# Patient Record
Sex: FEMALE | Race: WHITE | NOT HISPANIC OR LATINO | Employment: OTHER | URBAN - METROPOLITAN AREA
[De-identification: names, ages, dates, MRNs, and addresses within clinical notes are randomized per-mention and may not be internally consistent; named-entity substitution may affect disease eponyms.]

---

## 2019-04-02 LAB — PHQ9 SCORE: 3

## 2019-05-10 ENCOUNTER — TRANSFERRED RECORDS (OUTPATIENT)
Dept: HEALTH INFORMATION MANAGEMENT | Facility: CLINIC | Age: 24
End: 2019-05-10

## 2019-05-10 LAB
ALT SERPL-CCNC: 42 U/L (ref 9–72)
AST SERPL-CCNC: 19 U/L (ref 14–59)
CREAT SERPL-MCNC: 0.6 MG/DL (ref 0.5–1)
GLUCOSE SERPL-MCNC: 104 MG/DL (ref 65–100)
POTASSIUM SERPL-SCNC: 4.4 MMOL/L (ref 3.6–5)

## 2019-09-18 ENCOUNTER — OFFICE VISIT (OUTPATIENT)
Dept: FAMILY MEDICINE | Facility: CLINIC | Age: 24
End: 2019-09-18
Payer: MEDICAID

## 2019-09-18 VITALS
SYSTOLIC BLOOD PRESSURE: 128 MMHG | HEIGHT: 63 IN | HEART RATE: 83 BPM | TEMPERATURE: 98.5 F | BODY MASS INDEX: 51.91 KG/M2 | WEIGHT: 293 LBS | OXYGEN SATURATION: 96 % | RESPIRATION RATE: 11 BRPM | DIASTOLIC BLOOD PRESSURE: 77 MMHG

## 2019-09-18 DIAGNOSIS — F41.1 GAD (GENERALIZED ANXIETY DISORDER): ICD-10-CM

## 2019-09-18 DIAGNOSIS — Z11.3 SCREEN FOR STD (SEXUALLY TRANSMITTED DISEASE): ICD-10-CM

## 2019-09-18 DIAGNOSIS — Z13.220 SCREENING FOR LIPOID DISORDERS: ICD-10-CM

## 2019-09-18 DIAGNOSIS — F33.1 MODERATE EPISODE OF RECURRENT MAJOR DEPRESSIVE DISORDER (H): Primary | ICD-10-CM

## 2019-09-18 DIAGNOSIS — F84.0 AUTISM: ICD-10-CM

## 2019-09-18 DIAGNOSIS — Z23 NEED FOR PROPHYLACTIC VACCINATION AND INOCULATION AGAINST INFLUENZA: ICD-10-CM

## 2019-09-18 DIAGNOSIS — J30.1 NON-SEASONAL ALLERGIC RHINITIS DUE TO POLLEN: ICD-10-CM

## 2019-09-18 DIAGNOSIS — F70 MILD INTELLECTUAL DISABILITY: ICD-10-CM

## 2019-09-18 DIAGNOSIS — Z87.898 HISTORY OF SEIZURES: ICD-10-CM

## 2019-09-18 DIAGNOSIS — G43.009 MIGRAINE WITHOUT AURA AND WITHOUT STATUS MIGRAINOSUS, NOT INTRACTABLE: ICD-10-CM

## 2019-09-18 DIAGNOSIS — E03.9 HYPOTHYROIDISM, UNSPECIFIED TYPE: ICD-10-CM

## 2019-09-18 DIAGNOSIS — E73.9 LACTOSE INTOLERANCE: ICD-10-CM

## 2019-09-18 DIAGNOSIS — F91.3 OPPOSITIONAL DEFIANT DISORDER: ICD-10-CM

## 2019-09-18 DIAGNOSIS — S04.011A: ICD-10-CM

## 2019-09-18 DIAGNOSIS — F90.9 ATTENTION DEFICIT HYPERACTIVITY DISORDER (ADHD), UNSPECIFIED ADHD TYPE: ICD-10-CM

## 2019-09-18 DIAGNOSIS — E66.01 MORBID OBESITY (H): ICD-10-CM

## 2019-09-18 DIAGNOSIS — R73.09 ELEVATED GLUCOSE: ICD-10-CM

## 2019-09-18 DIAGNOSIS — F34.1 DYSTHYMIC DISORDER: ICD-10-CM

## 2019-09-18 DIAGNOSIS — Z30.42 DEPO-PROVERA CONTRACEPTIVE STATUS: ICD-10-CM

## 2019-09-18 LAB — HCG UR QL: NEGATIVE

## 2019-09-18 PROCEDURE — 99204 OFFICE O/P NEW MOD 45 MIN: CPT | Mod: 25 | Performed by: PHYSICIAN ASSISTANT

## 2019-09-18 PROCEDURE — 87491 CHLMYD TRACH DNA AMP PROBE: CPT | Performed by: PHYSICIAN ASSISTANT

## 2019-09-18 PROCEDURE — 90686 IIV4 VACC NO PRSV 0.5 ML IM: CPT | Performed by: PHYSICIAN ASSISTANT

## 2019-09-18 PROCEDURE — 87591 N.GONORRHOEAE DNA AMP PROB: CPT | Performed by: PHYSICIAN ASSISTANT

## 2019-09-18 PROCEDURE — 90471 IMMUNIZATION ADMIN: CPT | Performed by: PHYSICIAN ASSISTANT

## 2019-09-18 PROCEDURE — 81025 URINE PREGNANCY TEST: CPT | Performed by: PHYSICIAN ASSISTANT

## 2019-09-18 PROCEDURE — 96372 THER/PROPH/DIAG INJ SC/IM: CPT | Performed by: PHYSICIAN ASSISTANT

## 2019-09-18 RX ORDER — POLYETHYLENE GLYCOL 3350 17 G/17G
POWDER, FOR SOLUTION ORAL
Refills: 9 | COMMUNITY
Start: 2018-12-03 | End: 2020-07-09

## 2019-09-18 RX ORDER — SUMATRIPTAN 50 MG/1
TABLET, FILM COATED ORAL
Refills: 0 | COMMUNITY
Start: 2019-07-03 | End: 2022-02-16

## 2019-09-18 RX ORDER — POLYETHYLENE GLYCOL 3350 17 G/17G
POWDER, FOR SOLUTION ORAL
Refills: 2 | COMMUNITY
Start: 2019-09-16 | End: 2019-09-18

## 2019-09-18 RX ORDER — VENLAFAXINE HYDROCHLORIDE 75 MG/1
75 CAPSULE, EXTENDED RELEASE ORAL DAILY
Refills: 2 | COMMUNITY
Start: 2019-09-04 | End: 2024-06-06 | Stop reason: DRUGHIGH

## 2019-09-18 RX ORDER — MEDROXYPROGESTERONE ACETATE 150 MG/ML
150 INJECTION, SUSPENSION INTRAMUSCULAR
Status: DISCONTINUED | OUTPATIENT
Start: 2019-09-18 | End: 2021-03-27 | Stop reason: ALTCHOICE

## 2019-09-18 RX ORDER — MICONAZOLE NITRATE 20 MG/ML
TINCTURE TOPICAL
Refills: 3 | COMMUNITY
Start: 2019-04-29 | End: 2019-09-18

## 2019-09-18 RX ORDER — HYDROXYZINE HYDROCHLORIDE 25 MG/1
TABLET, FILM COATED ORAL
Refills: 1 | COMMUNITY
Start: 2019-07-26 | End: 2019-09-18

## 2019-09-18 RX ORDER — FAMOTIDINE 20 MG/1
TABLET, FILM COATED ORAL
Refills: 0 | COMMUNITY
Start: 2019-09-04 | End: 2020-07-20

## 2019-09-18 RX ORDER — TRAZODONE HYDROCHLORIDE 150 MG/1
150 TABLET ORAL
Refills: 3 | COMMUNITY
Start: 2019-07-25

## 2019-09-18 RX ORDER — LACTASE 3000 UNIT
TABLET ORAL
Refills: 2 | COMMUNITY
Start: 2019-09-15 | End: 2019-12-18

## 2019-09-18 RX ORDER — GABAPENTIN 300 MG/1
CAPSULE ORAL
Refills: 0 | COMMUNITY
Start: 2019-01-31 | End: 2019-09-18

## 2019-09-18 RX ORDER — SUMATRIPTAN 50 MG/1
TABLET, FILM COATED ORAL
Refills: 0 | Status: CANCELLED | OUTPATIENT
Start: 2019-09-18

## 2019-09-18 RX ORDER — LEVOTHYROXINE SODIUM 50 UG/1
TABLET ORAL
Refills: 2 | COMMUNITY
Start: 2019-09-15 | End: 2021-03-01

## 2019-09-18 RX ORDER — LORATADINE 10 MG/1
CAPSULE, LIQUID FILLED ORAL
Refills: 7 | COMMUNITY
Start: 2019-08-05 | End: 2021-01-11

## 2019-09-18 RX ORDER — OXCARBAZEPINE 600 MG/1
TABLET, FILM COATED ORAL
Refills: 8 | COMMUNITY
Start: 2019-08-26 | End: 2020-03-13

## 2019-09-18 RX ORDER — CLONAZEPAM 1 MG/1
TABLET ORAL
Refills: 1 | COMMUNITY
Start: 2018-11-21 | End: 2020-03-13 | Stop reason: ALTCHOICE

## 2019-09-18 RX ORDER — MULTIVITAMIN WITH FOLIC ACID 400 MCG
1 TABLET ORAL DAILY
Refills: 2 | COMMUNITY
Start: 2018-09-24 | End: 2019-09-18

## 2019-09-18 RX ORDER — OLANZAPINE 5 MG/1
TABLET ORAL
Refills: 3 | COMMUNITY
Start: 2019-07-25 | End: 2022-11-04

## 2019-09-18 RX ORDER — LITHIUM CARBONATE 450 MG
3 TABLET, EXTENDED RELEASE ORAL AT BEDTIME
Refills: 2 | COMMUNITY
Start: 2019-08-26

## 2019-09-18 RX ADMIN — MEDROXYPROGESTERONE ACETATE 150 MG: 150 INJECTION, SUSPENSION INTRAMUSCULAR at 11:50

## 2019-09-18 SDOH — HEALTH STABILITY: MENTAL HEALTH: HOW OFTEN DO YOU HAVE A DRINK CONTAINING ALCOHOL?: NEVER

## 2019-09-18 ASSESSMENT — ANXIETY QUESTIONNAIRES
7. FEELING AFRAID AS IF SOMETHING AWFUL MIGHT HAPPEN: NOT AT ALL
GAD7 TOTAL SCORE: 11
5. BEING SO RESTLESS THAT IT IS HARD TO SIT STILL: SEVERAL DAYS
1. FEELING NERVOUS, ANXIOUS, OR ON EDGE: NOT AT ALL
6. BECOMING EASILY ANNOYED OR IRRITABLE: NEARLY EVERY DAY
3. WORRYING TOO MUCH ABOUT DIFFERENT THINGS: NEARLY EVERY DAY
2. NOT BEING ABLE TO STOP OR CONTROL WORRYING: NEARLY EVERY DAY

## 2019-09-18 ASSESSMENT — MIFFLIN-ST. JEOR: SCORE: 2147.32

## 2019-09-18 ASSESSMENT — PATIENT HEALTH QUESTIONNAIRE - PHQ9
SUM OF ALL RESPONSES TO PHQ QUESTIONS 1-9: 1
5. POOR APPETITE OR OVEREATING: SEVERAL DAYS

## 2019-09-18 NOTE — LETTER
My Depression Action Plan  Name: Jane Lee   Date of Birth 1995  Date: 9/18/2019    My doctor: Su Travis   My clinic: [address] [address] [phone]          GREEN    ZONE   Good Control    What it looks like:     Things are going generally well. You have normal up s and down s. You may even feel depressed from time to time, but bad moods usually last less than a day.   What you need to do:  1. Continue to care for yourself (see self care plan)  2. Check your depression survival kit and update it as needed  3. Follow your physician s recommendations including any medication.  4. Do not stop taking medication unless you consult with your physician first.           YELLOW         ZONE Getting Worse    What it looks like:     Depression is starting to interfere with your life.     It may be hard to get out of bed; you may be starting to isolate yourself from others.    Symptoms of depression are starting to last most all day and this has happened for several days.     You may have suicidal thoughts but they are not constant.   What you need to do:     1. Call your care team, your response to treatment will improve if you keep your care team informed of your progress. Yellow periods are signs an adjustment may need to be made.     2. Continue your self-care, even if you have to fake it!    3. Talk to someone in your support network    4. Open up your depression survival kit           RED    ZONE Medical Alert - Get Help    What it looks like:     Depression is seriously interfering with your life.     You may experience these or other symptoms: You can t get out of bed most days, can t work or engage in other necessary activities, you have trouble taking care of basic hygiene, or basic responsibilities, thoughts of suicide or death that will not go away, self-injurious behavior.     What you need to do:  1. Call your care team and request a  same-day appointment. If they are not available (weekends or after hours) call your local crisis line, emergency room or 911.            Depression Self Care Plan / Survival Kit    Self-Care for Depression  Here s the deal. Your body and mind are really not as separate as most people think.  What you do and think affects how you feel and how you feel influences what you do and think. This means if you do things that people who feel good do, it will help you feel better.  Sometimes this is all it takes.  There is also a place for medication and therapy depending on how severe your depression is, so be sure to consult with your medical provider and/ or Behavioral Health Consultant if your symptoms are worsening or not improving.     In order to better manage my stress, I will:    Exercise  Get some form of exercise, every day. This will help reduce pain and release endorphins, the  feel good  chemicals in your brain. This is almost as good as taking antidepressants!  This is not the same as joining a gym and then never going! (they count on that by the way ) It can be as simple as just going for a walk or doing some gardening, anything that will get you moving.      Hygiene   Maintain good hygiene (Get out of bed in the morning, Make your bed, Brush your teeth, Take a shower, and Get dressed like you were going to work, even if you are unemployed).  If your clothes don't fit try to get ones that do.    Diet  I will strive to eat foods that are good for me, drink plenty of water, and avoid excessive sugar, caffeine, alcohol, and other mood-altering substances.  Some foods that are helpful in depression are: complex carbohydrates, B vitamins, flaxseed, fish or fish oil, fresh fruits and vegetables.    Psychotherapy  I agree to participate in Individual Therapy (if recommended).    Medication  If prescribed medications, I agree to take them.  Missing doses can result in serious side effects.  I understand that drinking  alcohol, or other illicit drug use, may cause potential side effects.  I will not stop my medication abruptly without first discussing it with my provider.    Staying Connected With Others  I will stay in touch with my friends, family members, and my primary care provider/team.    Use your imagination  Be creative.  We all have a creative side; it doesn t matter if it s oil painting, sand castles, or mud pies! This will also kick up the endorphins.    Witness Beauty  (AKA stop and smell the roses) Take a look outside, even in mid-winter. Notice colors, textures. Watch the squirrels and birds.     Service to others  Be of service to others.  There is always someone else in need.  By helping others we can  get out of ourselves  and remember the really important things.  This also provides opportunities for practicing all the other parts of the program.    Humor  Laugh and be silly!  Adjust your TV habits for less news and crime-drama and more comedy.    Control your stress  Try breathing deep, massage therapy, biofeedback, and meditation. Find time to relax each day.     My support system    Clinic Contact:  Phone number:    Contact 1:  Phone number:    Contact 2:  Phone number:    [place]/:  Phone number:    Therapist:  Phone number:    Local crisis center:    Phone number:    Other community support:  Phone number:

## 2019-09-18 NOTE — PROGRESS NOTES
"SUBJECTIVE:                                                    Jane Lee is a 24 year old female who presents to clinic today for the following health issues:    Chief Complaint   Patient presents with     Establish Care     Contraception     Imm/Inj     Flu Shot     *  Depo injection, last done on 5/10/2019  New group home [facility] on 8/5/2019, she was [facility] in [facility]      No periods with depo, has been doing this for a few years  Per patient she has not been sexually active    Has seen psychiatry: [facility] in [place] -   Lived in group home, discharged her, went to crisis in [place], then they sent her here    History of seizure disorder, brain surgery for seizures (per personal profile given to us: 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 Has imitrex. Sometimes gets cluster headaches or migraines.  Nausea, no aura    Normal BMs. Sleeps well. Mood stable.    Was previously on gabapentin, off last spring      Problem list and histories reviewed & adjusted, as indicated.  Additional history: none    Patient Active Problem List   Diagnosis     Morbid obesity (H)     Autism     Moderate episode of recurrent major depressive disorder (H)     Hypothyroidism, unspecified type     YARA (generalized anxiety disorder)     Mild intellectual disability     Attention deficit hyperactivity disorder (ADHD), unspecified ADHD type     Dysthymic disorder     Oppositional defiant disorder     History of seizures     Injury of right optic nerve, initial encounter     Lactose intolerance     Migraine without aura and without status migrainosus, not intractable     Non-seasonal allergic rhinitis due to pollen     Past Surgical History:   Procedure Laterality Date     BRAIN SURGERY  " "2000    for seizures. 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     OBJECTIVE:                                                    /77   Pulse 83   Temp 98.5  F (36.9  C) (Tympanic)   Resp 11   Ht 1.589 m (5' 2.56\")   Wt 142.5 kg (314 lb 3.2 oz)   SpO2 96%   BMI 56.45 kg/m   Body mass index is 56.45 kg/m .   GENERAL: healthy, alert, well nourished, well hydrated, no distress  RESP: lungs clear to auscultation - no rales, no rhonchi, no wheezes  CV: regular rates and rhythm, normal S1 S2, no S3 or S4 and no murmur, no click or rub -  PSYCH: Alert and oriented times 3; speech- coherent , normal rate and volume; able to articulate logical thoughts, able to abstract reason, no tangential thoughts, no hallucinations or delusions, affect- flat affect.        ASSESSMENT/PLAN:                                                      ASSESSMENT/PLAN:      ICD-10-CM    1. Moderate episode of recurrent major depressive disorder (H) F33.1 MENTAL HEALTH REFERRAL  - Adult; Psychiatry and Medication Management; Psychiatry; Other: [facility] [phone]; We will contact you to schedule the appointment or please call with any questions     CBC with platelets differential   2. Depo-Provera contraceptive status Z30.42 HCG Qual, Urine ([id number])     medroxyPROGESTERone (DEPO-PROVERA) injection 150 mg     C Medroxyprogesterone inj/1mg     THER/PROPH/DIAG INJ, SC/IM   3. Hypothyroidism, unspecified type E03.9 " Comprehensive metabolic panel     Hemoglobin A1c     **TSH with free T4 reflex FUTURE 1yr   4. Morbid obesity (H) E66.01    5. Need for prophylactic vaccination and inoculation against influenza Z23 INFLUENZA VACCINE IM > 6 MONTHS VALENT IIV4 [96774]     Vaccine Administration, Initial [[id number]]   6. Screening for lipoid disorders Z13.220 Lipid panel reflex to direct LDL Fasting   7. Elevated glucose R73.09 Comprehensive metabolic panel     Hemoglobin A1c   8. Screen for STD (sexually transmitted disease) Z11.3 Neisseria gonorrhoeae PCR     Chlamydia trachomatis PCR   9. Autism F84.0    10. YARA (generalized anxiety disorder) F41.1    11. Mild intellectual disability F70    12. Attention deficit hyperactivity disorder (ADHD), unspecified ADHD type F90.9    13. Dysthymic disorder F34.1    14. Oppositional defiant disorder F91.3    15. History of seizures Z87.898 NEUROLOGY ADULT REFERRAL   16. Injury of right optic nerve, initial encounter S04.011A NEUROLOGY ADULT REFERRAL    difficult peripheral vision, 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   17. Lactose intolerance E73.9    18. Migraine without aura and without status migrainosus, not intractable G43.009    19. Non-seasonal allergic rhinitis due to pollen J30.1      New patient.  New to group home, caregiver unsure of entire history. History given by caregiver, patient, and paperwork from patient.    JARRETT signed for previous clinic in [place] - per patient she is up to date on PAP smear    Will continue current medication, allowed group home PRNs. Filled out forms for group home.    Unclear why patient is on metformin - hopefully will see that from records. Will check fasting labwork.    Needs follow up with psychiatry, this is the plan from group home as well  Group home has crisis plan in place for SI. Per patient she sometimes says that she will hurt herself but states she has no intention to ever hurt herself and she has no plans.    Su Travis PA-C    [facility]

## 2019-09-18 NOTE — NURSING NOTE
Clinic Administered Medication Documentation    MEDICATION LIST:   Depo Provera Documentation    Prior to injection, verified patient identity using patient's name and date of birth. Medication was administered. Please see MAR and medication order for additional information. Patient instructed to remain in clinic for 15 minutes and report any adverse reaction to staff immediately .    BP: 128/77    LAST PAP/EXAM: No results found for: PAP  URINE HCG:negative    NEXT INJECTION DUE: 12/4/19 - 12/18/19    Was entire vial of medication used? Yes  Vial/Syringe: Single dose vial  Expiration Date:  07/2020    Jayne Zimmerman CMA

## 2019-09-19 LAB
C TRACH DNA SPEC QL NAA+PROBE: NEGATIVE
N GONORRHOEA DNA SPEC QL NAA+PROBE: NEGATIVE
SPECIMEN SOURCE: NORMAL
SPECIMEN SOURCE: NORMAL

## 2019-09-19 ASSESSMENT — ANXIETY QUESTIONNAIRES: GAD7 TOTAL SCORE: 11

## 2019-09-22 PROBLEM — F41.1 GAD (GENERALIZED ANXIETY DISORDER): Status: ACTIVE | Noted: 2019-09-22

## 2019-09-22 PROBLEM — F91.3 OPPOSITIONAL DEFIANT DISORDER: Status: ACTIVE | Noted: 2019-09-22

## 2019-09-22 PROBLEM — F70 MILD INTELLECTUAL DISABILITY: Status: ACTIVE | Noted: 2019-09-22

## 2019-09-22 PROBLEM — Z87.898 HISTORY OF SEIZURES: Status: ACTIVE | Noted: 2019-09-22

## 2019-09-22 PROBLEM — F90.9 ATTENTION DEFICIT HYPERACTIVITY DISORDER (ADHD), UNSPECIFIED ADHD TYPE: Status: ACTIVE | Noted: 2019-09-22

## 2019-09-22 PROBLEM — F34.1 DYSTHYMIC DISORDER: Status: ACTIVE | Noted: 2019-09-22

## 2019-09-22 PROBLEM — G43.009 MIGRAINE WITHOUT AURA AND WITHOUT STATUS MIGRAINOSUS, NOT INTRACTABLE: Status: ACTIVE | Noted: 2019-09-22

## 2019-09-22 PROBLEM — F33.1 MODERATE EPISODE OF RECURRENT MAJOR DEPRESSIVE DISORDER (H): Status: ACTIVE | Noted: 2019-09-22

## 2019-10-01 DIAGNOSIS — Z13.220 SCREENING FOR LIPOID DISORDERS: ICD-10-CM

## 2019-10-01 DIAGNOSIS — E03.9 HYPOTHYROIDISM, UNSPECIFIED TYPE: ICD-10-CM

## 2019-10-01 DIAGNOSIS — R73.09 ELEVATED GLUCOSE: ICD-10-CM

## 2019-10-01 DIAGNOSIS — F33.1 MODERATE EPISODE OF RECURRENT MAJOR DEPRESSIVE DISORDER (H): ICD-10-CM

## 2019-10-01 LAB
ALBUMIN SERPL-MCNC: 3.7 G/DL (ref 3.4–5)
ALP SERPL-CCNC: 81 U/L (ref 40–150)
ALT SERPL W P-5'-P-CCNC: 33 U/L (ref 0–50)
ANION GAP SERPL CALCULATED.3IONS-SCNC: 5 MMOL/L (ref 3–14)
AST SERPL W P-5'-P-CCNC: 15 U/L (ref 0–45)
BASOPHILS # BLD AUTO: 0 10E9/L (ref 0–0.2)
BASOPHILS NFR BLD AUTO: 0.3 %
BILIRUB SERPL-MCNC: 0.3 MG/DL (ref 0.2–1.3)
BUN SERPL-MCNC: 12 MG/DL (ref 7–30)
CALCIUM SERPL-MCNC: 9 MG/DL (ref 8.5–10.1)
CHLORIDE SERPL-SCNC: 110 MMOL/L (ref 94–109)
CHOLEST SERPL-MCNC: 169 MG/DL
CO2 SERPL-SCNC: 24 MMOL/L (ref 20–32)
CREAT SERPL-MCNC: 0.62 MG/DL (ref 0.52–1.04)
DIFFERENTIAL METHOD BLD: NORMAL
EOSINOPHIL # BLD AUTO: 0.6 10E9/L (ref 0–0.7)
EOSINOPHIL NFR BLD AUTO: 7.5 %
ERYTHROCYTE [DISTWIDTH] IN BLOOD BY AUTOMATED COUNT: 12.9 % (ref 10–15)
GFR SERPL CREATININE-BSD FRML MDRD: >90 ML/MIN/{1.73_M2}
GLUCOSE SERPL-MCNC: 90 MG/DL (ref 70–99)
HBA1C MFR BLD: 4.6 % (ref 0–5.6)
HCT VFR BLD AUTO: 40.5 % (ref 35–47)
HDLC SERPL-MCNC: 36 MG/DL
HGB BLD-MCNC: 13 G/DL (ref 11.7–15.7)
LDLC SERPL CALC-MCNC: 95 MG/DL
LYMPHOCYTES # BLD AUTO: 2.1 10E9/L (ref 0.8–5.3)
LYMPHOCYTES NFR BLD AUTO: 26.5 %
MCH RBC QN AUTO: 28.5 PG (ref 26.5–33)
MCHC RBC AUTO-ENTMCNC: 32.1 G/DL (ref 31.5–36.5)
MCV RBC AUTO: 89 FL (ref 78–100)
MONOCYTES # BLD AUTO: 0.6 10E9/L (ref 0–1.3)
MONOCYTES NFR BLD AUTO: 7.6 %
NEUTROPHILS # BLD AUTO: 4.6 10E9/L (ref 1.6–8.3)
NEUTROPHILS NFR BLD AUTO: 58.1 %
NONHDLC SERPL-MCNC: 133 MG/DL
PLATELET # BLD AUTO: 242 10E9/L (ref 150–450)
POTASSIUM SERPL-SCNC: 4.3 MMOL/L (ref 3.4–5.3)
PROT SERPL-MCNC: 7.2 G/DL (ref 6.8–8.8)
RBC # BLD AUTO: 4.56 10E12/L (ref 3.8–5.2)
SODIUM SERPL-SCNC: 139 MMOL/L (ref 133–144)
TRIGL SERPL-MCNC: 188 MG/DL
TSH SERPL DL<=0.005 MIU/L-ACNC: 3.15 MU/L (ref 0.4–4)
WBC # BLD AUTO: 7.9 10E9/L (ref 4–11)

## 2019-10-01 PROCEDURE — 84443 ASSAY THYROID STIM HORMONE: CPT | Performed by: PHYSICIAN ASSISTANT

## 2019-10-01 PROCEDURE — 85025 COMPLETE CBC W/AUTO DIFF WBC: CPT | Performed by: PHYSICIAN ASSISTANT

## 2019-10-01 PROCEDURE — 83036 HEMOGLOBIN GLYCOSYLATED A1C: CPT | Performed by: PHYSICIAN ASSISTANT

## 2019-10-01 PROCEDURE — 80061 LIPID PANEL: CPT | Performed by: PHYSICIAN ASSISTANT

## 2019-10-01 PROCEDURE — 36415 COLL VENOUS BLD VENIPUNCTURE: CPT | Performed by: PHYSICIAN ASSISTANT

## 2019-10-01 PROCEDURE — 80053 COMPREHEN METABOLIC PANEL: CPT | Performed by: PHYSICIAN ASSISTANT

## 2019-12-10 ENCOUNTER — ALLIED HEALTH/NURSE VISIT (OUTPATIENT)
Dept: FAMILY MEDICINE | Facility: CLINIC | Age: 24
End: 2019-12-10
Payer: MEDICAID

## 2019-12-10 DIAGNOSIS — Z30.42 ENCOUNTER FOR SURVEILLANCE OF INJECTABLE CONTRACEPTIVE: Primary | ICD-10-CM

## 2019-12-10 PROCEDURE — 96372 THER/PROPH/DIAG INJ SC/IM: CPT

## 2019-12-10 PROCEDURE — 99207 ZZC NO CHARGE NURSE ONLY: CPT

## 2019-12-10 RX ADMIN — MEDROXYPROGESTERONE ACETATE 150 MG: 150 INJECTION, SUSPENSION INTRAMUSCULAR at 14:35

## 2019-12-10 NOTE — PROGRESS NOTES
Clinic Administered Medication Documentation    MEDICATION LIST:    Depo Provera Documentation    Prior to injection, verified patient identity using patient's name and date of birth. Medication was administered. Please see MAR and medication order for additional information. Patient instructed to remain in clinic for 15 minutes.    BP: Data Unavailable    LAST PAP/EXAM: No results found for: PAP  URINE HCG:not indicated    NEXT INJECTION DUE: 2/25/20 - 3/10/20    Was entire vial of medication used? Yes  Vial/Syringe: Single dose vial  Expiration Date:  07/2020    BP: Data Unavailable    LAST PAP/EXAM: No results found for: PAP  URINE HCG:not indicated    The following medication was given:     MEDICATION: Depo Provera 150mg  ROUTE: IM  SITE: RUQ - Gluteus  : [facility]  LOT #: [id number]   EXP:07/2020  NEXT INJECTION DUE: 2/25/20 - 3/10/20   Provider: Dr. Mary Fuentes [facility]

## 2019-12-17 DIAGNOSIS — E73.9 LACTOSE INTOLERANCE: Primary | ICD-10-CM

## 2019-12-17 NOTE — TELEPHONE ENCOUNTER
LACTASE ENZ 3000IU TAB      Last Written Prescription Date:  9/15/19  Last Fill Quantity: na,   # refills: 2  Last Office Visit: 9/18/19  Future Office visit:       Requested Prescriptions   Pending Prescriptions Disp Refills     LACTASE ENZYME 3000 units tablet [Pharmacy Med Name: LACTASE ENZ 3000IU TAB]  PRN     Sig: TAKE 3 TABLETS BY MOUTH 3 TIMES DAILY AS NEEDED (9000 UNITS) WITH FOOD CONTAINING LACTOSE       There is no refill protocol information for this order

## 2019-12-18 DIAGNOSIS — E73.9 LACTOSE INTOLERANCE: ICD-10-CM

## 2019-12-18 RX ORDER — LACTASE 3000 UNIT
TABLET ORAL
Qty: 100 TABLET | Refills: 1 | Status: SHIPPED | OUTPATIENT
Start: 2019-12-18 | End: 2020-01-21

## 2019-12-18 NOTE — TELEPHONE ENCOUNTER
Routing refill request to provider for review/approval because:  Drug not on the [facility] refill protocol   Munir Pena RN

## 2019-12-23 RX ORDER — LACTASE 3000 UNIT
TABLET ORAL
Refills: 11 | OUTPATIENT
Start: 2019-12-23

## 2020-01-17 DIAGNOSIS — Z79.899 HIGH RISK MEDICATION USE: Primary | ICD-10-CM

## 2020-01-21 DIAGNOSIS — E73.9 LACTOSE INTOLERANCE: ICD-10-CM

## 2020-01-21 RX ORDER — LACTASE 3000 UNIT
TABLET ORAL
Qty: 270 TABLET | Refills: 11 | Status: SHIPPED | OUTPATIENT
Start: 2020-01-21

## 2020-01-21 NOTE — TELEPHONE ENCOUNTER
Lactase Enzyme 3000 UNIT Tablet      Last Written Prescription Date:  12/18/19  Last Fill Quantity: 100,   # refills: 1  Last Office Visit: 9/18/19  Future Office visit:       Requested Prescriptions   Pending Prescriptions Disp Refills     LACTASE ENZYME 3000 units tablet [Pharmacy Med Name: Lactase Enzyme 3000 UNIT Tablet]  11     Sig: TAKE 3 TABLETS BY MOUTH 3 TIMES DAILY AS NEEDED (9000 UNITS) WITH FOOD CONTAINING LACTOSE       There is no refill protocol information for this order

## 2020-01-21 NOTE — TELEPHONE ENCOUNTER
Routing refill request to provider for review/approval because:  Drug not on the [facility] refill protocol     Teagan Ceja RN

## 2020-02-28 ENCOUNTER — TELEPHONE (OUTPATIENT)
Dept: FAMILY MEDICINE | Facility: CLINIC | Age: 25
End: 2020-02-28

## 2020-02-28 NOTE — TELEPHONE ENCOUNTER
Pt called and does have a form available she stated a letter would work and she would like to have pets for her depression.       She lives in a group home and for her to have pets she needs this.    Pets would be chanda pig or bird she was thinking.    Jyothi Davidson  [facility]

## 2020-02-28 NOTE — TELEPHONE ENCOUNTER
Please clarify. Can this be a letter?  Why is [facility] not completing this form? If a form needs to be completed, patient needs to drop off the form.

## 2020-02-28 NOTE — TELEPHONE ENCOUNTER
Reason for Call:  Other     Detailed comments: Pt is calling for forms to complete for a animal for emotional value for her depression.  Per [employer] [facility]    Phone Number Patient can be reached at: Home number on file [phone]   She wasn't sure of her number this is what came up.    Best Time: any    Can we leave a detailed message on this number? YES    Call taken on 2/28/2020 at 1:01 PM by Jyothi Davidson

## 2020-03-02 NOTE — TELEPHONE ENCOUNTER
Message left on patient's personally identified vm advising her of need for appointment and to call the clinic at [phone] and schedule with Katelyn Travis.  Munir Pena RN

## 2020-03-13 ENCOUNTER — OFFICE VISIT (OUTPATIENT)
Dept: FAMILY MEDICINE | Facility: CLINIC | Age: 25
End: 2020-03-13
Payer: MEDICAID

## 2020-03-13 VITALS
HEART RATE: 89 BPM | SYSTOLIC BLOOD PRESSURE: 134 MMHG | WEIGHT: 293 LBS | OXYGEN SATURATION: 95 % | TEMPERATURE: 99 F | RESPIRATION RATE: 18 BRPM | DIASTOLIC BLOOD PRESSURE: 81 MMHG | BODY MASS INDEX: 60.79 KG/M2

## 2020-03-13 DIAGNOSIS — Z12.4 SCREENING FOR MALIGNANT NEOPLASM OF CERVIX: ICD-10-CM

## 2020-03-13 DIAGNOSIS — E03.9 HYPOTHYROIDISM, UNSPECIFIED TYPE: ICD-10-CM

## 2020-03-13 DIAGNOSIS — E66.01 MORBID OBESITY (H): ICD-10-CM

## 2020-03-13 DIAGNOSIS — F33.1 MODERATE EPISODE OF RECURRENT MAJOR DEPRESSIVE DISORDER (H): ICD-10-CM

## 2020-03-13 DIAGNOSIS — Z11.4 SCREENING FOR HIV (HUMAN IMMUNODEFICIENCY VIRUS): ICD-10-CM

## 2020-03-13 DIAGNOSIS — R19.7 DIARRHEA, UNSPECIFIED TYPE: ICD-10-CM

## 2020-03-13 DIAGNOSIS — Z87.898 HISTORY OF SEIZURES: ICD-10-CM

## 2020-03-13 DIAGNOSIS — F41.1 GAD (GENERALIZED ANXIETY DISORDER): ICD-10-CM

## 2020-03-13 DIAGNOSIS — K64.4 RESIDUAL HEMORRHOIDAL SKIN TAGS: ICD-10-CM

## 2020-03-13 DIAGNOSIS — F70 MILD INTELLECTUAL DISABILITY: ICD-10-CM

## 2020-03-13 DIAGNOSIS — Z00.00 ROUTINE GENERAL MEDICAL EXAMINATION AT A HEALTH CARE FACILITY: Primary | ICD-10-CM

## 2020-03-13 LAB
ALBUMIN SERPL-MCNC: 3.8 G/DL (ref 3.4–5)
ALP SERPL-CCNC: 75 U/L (ref 40–150)
ALT SERPL W P-5'-P-CCNC: 43 U/L (ref 0–50)
ANION GAP SERPL CALCULATED.3IONS-SCNC: 5 MMOL/L (ref 3–14)
AST SERPL W P-5'-P-CCNC: 16 U/L (ref 0–45)
BILIRUB SERPL-MCNC: 0.2 MG/DL (ref 0.2–1.3)
BUN SERPL-MCNC: 9 MG/DL (ref 7–30)
CALCIUM SERPL-MCNC: 9 MG/DL (ref 8.5–10.1)
CHLORIDE SERPL-SCNC: 109 MMOL/L (ref 94–109)
CO2 SERPL-SCNC: 25 MMOL/L (ref 20–32)
CREAT SERPL-MCNC: 0.67 MG/DL (ref 0.52–1.04)
GFR SERPL CREATININE-BSD FRML MDRD: >90 ML/MIN/{1.73_M2}
GLUCOSE SERPL-MCNC: 94 MG/DL (ref 70–99)
POTASSIUM SERPL-SCNC: 4.1 MMOL/L (ref 3.4–5.3)
PROT SERPL-MCNC: 7.3 G/DL (ref 6.8–8.8)
SODIUM SERPL-SCNC: 139 MMOL/L (ref 133–144)
TSH SERPL DL<=0.005 MIU/L-ACNC: 2.9 MU/L (ref 0.4–4)

## 2020-03-13 PROCEDURE — 36415 COLL VENOUS BLD VENIPUNCTURE: CPT | Performed by: PHYSICIAN ASSISTANT

## 2020-03-13 PROCEDURE — 87389 HIV-1 AG W/HIV-1&-2 AB AG IA: CPT | Performed by: PHYSICIAN ASSISTANT

## 2020-03-13 PROCEDURE — 80183 DRUG SCRN QUANT OXCARBAZEPIN: CPT | Mod: 90 | Performed by: PHYSICIAN ASSISTANT

## 2020-03-13 PROCEDURE — G0145 SCR C/V CYTO,THINLAYER,RESCR: HCPCS | Performed by: PHYSICIAN ASSISTANT

## 2020-03-13 PROCEDURE — 80053 COMPREHEN METABOLIC PANEL: CPT | Performed by: PHYSICIAN ASSISTANT

## 2020-03-13 PROCEDURE — 99213 OFFICE O/P EST LOW 20 MIN: CPT | Mod: 25 | Performed by: PHYSICIAN ASSISTANT

## 2020-03-13 PROCEDURE — 96372 THER/PROPH/DIAG INJ SC/IM: CPT | Performed by: PHYSICIAN ASSISTANT

## 2020-03-13 PROCEDURE — 84443 ASSAY THYROID STIM HORMONE: CPT | Performed by: PHYSICIAN ASSISTANT

## 2020-03-13 PROCEDURE — 99395 PREV VISIT EST AGE 18-39: CPT | Mod: 25 | Performed by: PHYSICIAN ASSISTANT

## 2020-03-13 RX ORDER — OXCARBAZEPINE 600 MG/1
600 TABLET, FILM COATED ORAL 3 TIMES DAILY
Qty: 270 TABLET | Refills: 1 | Status: SHIPPED | OUTPATIENT
Start: 2020-03-13 | End: 2021-03-01

## 2020-03-13 RX ADMIN — MEDROXYPROGESTERONE ACETATE 150 MG: 150 INJECTION, SUSPENSION INTRAMUSCULAR at 11:54

## 2020-03-13 ASSESSMENT — ANXIETY QUESTIONNAIRES
6. BECOMING EASILY ANNOYED OR IRRITABLE: SEVERAL DAYS
1. FEELING NERVOUS, ANXIOUS, OR ON EDGE: SEVERAL DAYS
3. WORRYING TOO MUCH ABOUT DIFFERENT THINGS: SEVERAL DAYS
2. NOT BEING ABLE TO STOP OR CONTROL WORRYING: SEVERAL DAYS
7. FEELING AFRAID AS IF SOMETHING AWFUL MIGHT HAPPEN: NOT AT ALL
5. BEING SO RESTLESS THAT IT IS HARD TO SIT STILL: SEVERAL DAYS
GAD7 TOTAL SCORE: 6

## 2020-03-13 ASSESSMENT — PATIENT HEALTH QUESTIONNAIRE - PHQ9
SUM OF ALL RESPONSES TO PHQ QUESTIONS 1-9: 3
5. POOR APPETITE OR OVEREATING: SEVERAL DAYS

## 2020-03-13 ASSESSMENT — PAIN SCALES - GENERAL: PAINLEVEL: NO PAIN (0)

## 2020-03-13 NOTE — LETTER
March 18, 2020      Jane Lee  [address] [place], [place] [id number]    Dear ,      I am happy to inform you that your recent cervical cancer screening test (PAP smear) was normal.      Preventative screenings such as this help to ensure your health for years to come. You should repeat a pap smear in 3 years, unless otherwise directed.      You will still need to return to the clinic every year for your annual exam and other preventive tests.     If you have additional questions regarding this result, please call our registered nurse, Rachel at [phone].      Sincerely,      Su Travis PA-C/neto

## 2020-03-13 NOTE — PATIENT INSTRUCTIONS
Keep working on getting appointment with counselor  Schedule appointments with nutritionist and neurologist  STOP the metformin  Follow up if diarrhea or hemorrhoids worsen      Preventive Health Recommendations  Female Ages 21 to 25     Yearly exam:     See your health care provider every year in order to  o Review health changes.   o Discuss preventive care.    o Review your medicines if your doctor has prescribed any.      You should be tested each year for STDs (sexually transmitted diseases).       Talk to your provider about how often you should have cholesterol testing.      Get a Pap test every three years. If you have an abnormal result, your doctor may have you test more often.      If you are at risk for diabetes, you should have a diabetes test (fasting glucose).     Shots:     Get a flu shot each year.     Get a tetanus shot every 10 years.     Consider getting the shot (vaccine) that prevents cervical cancer (Gardasil).    Nutrition:     Eat at least 5 servings of fruits and vegetables each day.    Eat whole-grain bread, whole-wheat pasta and brown rice instead of white grains and rice.    Get adequate Calcium and Vitamin D.     Lifestyle    Exercise at least 150 minutes a week each week (30 minutes a day, 5 days a week). This will help you control your weight and prevent disease.    Limit alcohol to one drink per day.    No smoking.     Wear sunscreen to prevent skin cancer.    See your dentist every six months for an exam and cleaning.      Patient Education     Hemorrhoids    Hemorrhoids are swollen and inflamed veins inside the rectum and near the anus. The rectum is the last several inches of the colon. The anus is the passage between the rectum and the outside of the body.  Causes  The veins can become swollen due to increased pressure in them. This is most often caused by:    Chronic constipation or diarrhea    Straining when having a bowel movement    Sitting too long on the toilet    A  low-fiber diet    Pregnancy  Symptoms    Bleeding from the rectum (this may be noticeable after bowel movements)    Lump near the anus    Itching around the anus    Pain around the anus  There are different types of hemorrhoids. Depending on the type you have and the severity, you may be able to treat yourself at home. In some cases, a procedure may be the best treatment option. Your healthcare provider can tell you more about this, if needed.  Home care  General care    To get relief from pain or itching, try:  ? Medicines. Your healthcare provider may recommend stool softeners, suppositories, or laxatives to help manage constipation. Use these exactly as directed.  ? Sitz baths. A sitz bath involves sitting in a few inches of warm bath water. Be careful not to make the water so hot that you burn yourself--test it before sitting in it. Soak for about 10 to 15 minutes a few times a day. This may help relieve pain.  ? Topical products. Your healthcare provider may prescribe or recommend creams, ointments, or pads that can be applied to the hemorrhoid. Use these exactly as directed.  Tips to help prevent hemorrhoids    Eat more fiber. Fiber adds bulk to stool and absorbs water as it moves through your colon. This makes stool softer and easier to pass.  ? Increase the fiber in your diet with more fiber-rich foods. These include fresh fruit, vegetables, and whole grains.  ? Take a fiber supplement or bulking agent, if advised by your healthcare provider. These include products such as psyllium or methylcellulose.    Drink more water. Your healthcare provider may direct you to drink plenty of water. This can help keep stool soft.    Be more active. Frequent exercise aids digestion and helps prevent constipation. It may also help make bowel movements more regular.    Don t strain during bowel movements. This can make hemorrhoids more likely. Also, don t sit on the toilet for long periods of time.  Follow-up care  Follow  up with your healthcare provider as advised. If a culture or imaging tests were done, someone will let you know the results when they are ready. This may take a few days or longer. If your healthcare provider recommends a procedure for your hemorrhoids, these options can be discussed. Options may include surgery and outpatient office treatments.  When to seek medical advice  Call your healthcare provider right away if any of these occur:    Increased bleeding from the rectum    Increased pain around the rectum or anus    Weakness or dizziness  Call 911  Call 911 if any of these occur:    Trouble breathing or swallowing    Fainting or loss of consciousness    Unusually fast heart rate    Vomiting blood    Large amounts of blood in stool or black, tarry stools  Date Last Reviewed: 9/1/2017 2000-2019 [employer]. [address] [place] [address], [place] [address]. All rights reserved. This information is not intended as a substitute for professional medical care. Always follow your healthcare professional's instructions.

## 2020-03-13 NOTE — LETTER
March 17, 2020      Jane Lee  [address] [place] [id number]      Dear Jane,    We are writing to inform you of your test results.    Labs are normal/stable.     If you have any questions or concerns, please call the clinic at the number listed above.     Sincerely,    Su Travis PA-C/sc    Resulted Orders   HIV Antigen Antibody Combo   Result Value Ref Range    HIV Antigen Antibody Combo Nonreactive NR^Nonreactive       TSH with free T4 reflex   Result Value Ref Range    TSH 2.90 0.40 - 4.00 mU/L   Comprehensive metabolic panel   Result Value Ref Range    Sodium 139 133 - 144 mmol/L    Potassium 4.1 3.4 - 5.3 mmol/L    Chloride 109 94 - 109 mmol/L    Carbon Dioxide 25 20 - 32 mmol/L    Anion Gap 5 3 - 14 mmol/L    Glucose 94 70 - 99 mg/dL    Urea Nitrogen 9 7 - 30 mg/dL    Creatinine 0.67 0.52 - 1.04 mg/dL    GFR Estimate >90 >60 mL/min/[1.73_m2]    GFR Estimate If Black >90 >60 mL/min/[1.73_m2]    Calcium 9.0 8.5 - 10.1 mg/dL    Bilirubin Total 0.2 0.2 - 1.3 mg/dL    Albumin 3.8 3.4 - 5.0 g/dL    Protein Total 7.3 6.8 - 8.8 g/dL    Alkaline Phosphatase 75 40 - 150 U/L    ALT 43 0 - 50 U/L    AST 16 0 - 45 U/L

## 2020-03-13 NOTE — PROGRESS NOTES
SUBJECTIVE:   CC: Jane Lee is an 24 year old woman who presents for preventive health visit.     Healthy Habits:    Do you get at least three servings of calcium containing foods daily (dairy, green leafy vegetables, etc.)? yes    Amount of exercise or daily activities, outside of work: 1 day(s) per week    Problems taking medications regularly No    Medication side effects: No    Have you had an eye exam in the past two years? no    Do you see a dentist twice per year? yes    Do you have sleep apnea, excessive snoring or daytime drowsiness?no    Patient informed that anything we discuss that is not related to preventative medicine, may be billed for; patient verbalizes understanding.    -She has been experiencing some diarrhea after she eat and takes medication. She does drink a lot of coffee with creamer. She does take lactose pills.     -She is also concerned about her hemorrhoids.     -She also has an order from [facility] to get some labs done.  Lithium level    She was prescribed metformin for weight loss by previous psychiatrist  We tested labs in Oct 2019    She cut her right wrist yesterday because she wanted to go to [facility] and couldn't  Has crisis plan in place. Has increased behaviors and SI when things don't go her way  Feels fine today, denies SI  Sees psychiatrist  Doesn't have counselor yet    Diarrhea has been on and off. Normal this week  Per caregiver from group home, she has not told them of any issues       Today's PHQ-2 Score:   PHQ-2 ( 1999 [employer]) 3/13/2020   Q1: Little interest or pleasure in doing things 0   Q2: Feeling down, depressed or hopeless 0   PHQ-2 Score 0     Abuse: Current or Past(Physical, Sexual or Emotional)- No  Do you feel safe in your environment? Yes        Social History     Tobacco Use     Smoking status: Never Smoker     Smokeless tobacco: Never Used   Substance Use Topics     Alcohol use: Never     Frequency: Never     If you drink alcohol do you typically have  ">3 drinks per day or >7 drinks per week? No                     Reviewed orders with patient.  Reviewed health maintenance and updated orders accordingly - Yes  Lab work is in process  Labs reviewed in EPIC  BP Readings from Last 3 Encounters:   03/13/20 134/81   09/18/19 128/77    Wt Readings from Last 3 Encounters:   03/13/20 (!) 153.5 kg (338 lb 6.4 oz)   09/18/19 142.5 kg (314 lb 3.2 oz)                  Patient Active Problem List   Diagnosis     Morbid obesity (H)     Autism     Moderate episode of recurrent major depressive disorder (H)     Hypothyroidism, unspecified type     YARA (generalized anxiety disorder)     Mild intellectual disability     Attention deficit hyperactivity disorder (ADHD), unspecified ADHD type     Dysthymic disorder     Oppositional defiant disorder     History of seizures     Injury of right optic nerve, initial encounter     Lactose intolerance     Migraine without aura and without status migrainosus, not intractable     Non-seasonal allergic rhinitis due to pollen     Past Surgical History:   Procedure Laterality Date     BRAIN SURGERY  2000    for seizures. 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    Pertinent mammograms are reviewed under the imaging tab.  History of abnormal Pap smear: NO - age 21-29 PAP every 3 years recommended     Reviewed and updated as needed this visit by clinical staff  Tobacco  Allergies  Meds  Med Hx  Surg Hx  Fam Hx  Soc Hx        Reviewed and updated as needed this visit by Provider  Tobacco  Med Hx  Surg Hx  " "Fam Hx  Soc Hx       History reviewed. No pertinent past medical history.   Past Surgical History:   Procedure Laterality Date     BRAIN SURGERY  2000    for seizures. 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 Periods are regular.  MUSCULOSKELETAL: NEGATIVE for significant arthralgias or myalgia  NEURO: NEGATIVE for weakness, dizziness or paresthesias  PSYCHIATRIC: NEGATIVE for changes in mood or affect    OBJECTIVE:   /81   Pulse 89   Temp 99  F (37.2  C) (Tympanic)   Resp 18   Wt (!) 153.5 kg (338 lb 6.4 oz)   SpO2 95%   BMI 60.79 kg/m    EXAM:  GENERAL: healthy, alert and no distress  HENT: ear canals and TM's normal, nose and mouth without ulcers or lesions  NECK: no adenopathy, no asymmetry, masses, or scars and thyroid normal to palpation  RESP: lungs clear to auscultation - no rales, rhonchi or wheezes  BREAST: normal without masses, tenderness or nipple discharge and no palpable axillary masses or adenopathy  CV: regular rate and rhythm, normal S1 S2, no S3 or S4, no murmur, click or rub, no peripheral edema and peripheral pulses strong  ABDOMEN: soft, nontender, no hepatosplenomegaly, no masses and bowel sounds normal   (female):  Difficult  exam due to patient discomfort/anxiety. Deferred pelvic exam, did vaginal PAP  RECTAL: small residual hemorrhoid, no tenderness   MS: no gross musculoskeletal defects noted, no edema  SKIN: no suspicious " lesions or rashes  NEURO: Normal strength and tone, mentation intact and speech normal  BACK: no CVA tenderness, no paralumbar tenderness  PSYCH: mentation appears normal, affect normal/bright  LYMPH: no cervical, supraclavicular, axillary, or inguinal adenopathy      Diagnostic Test Results:  Labs reviewed in Epic    ASSESSMENT/PLAN:     ASSESSMENT/PLAN:      ICD-10-CM    1. Routine general medical examination at a health care facility  Z00.00 INJECTION INTRAMUSCULAR OR SUB-Q     C Medroxyprogesterone inj/1mg   2. Moderate episode of recurrent major depressive disorder (H)  F33.1    3. Morbid obesity (H)  E66.01 NUTRITION REFERRAL   4. Hypothyroidism, unspecified type  E03.9 TSH with free T4 reflex   5. History of seizures  Z87.898 Comprehensive metabolic panel     NEUROLOGY ADULT REFERRAL     OXcarbazepine (TRILEPTAL) 600 MG tablet     Oxcarbazepine level   6. Screening for HIV (human immunodeficiency virus)  Z11.4 HIV Antigen Antibody Combo   7. Screening for malignant neoplasm of cervix  Z12.4 Pap imaged thin layer screen only - recommended age 21 - 24 years   8. Diarrhea, unspecified type  R19.7    9. Residual hemorrhoidal skin tags  K64.4    10. YARA (generalized anxiety disorder)  F41.1    11. Mild intellectual disability  F70      Patient would like to stop metformin, has not helped with weight and may contribute to diarrhea. Referred to nutrition, which she is interested in.  Patient needs refills of oxcarbazepine. She has not had seizure in years. Needs follow up with neurology, they request referral. Will refill short term.  Continue follow up with psychiatry    Patient Instructions   Keep working on getting appointment with counselor  Schedule appointments with nutritionist and neurologist  STOP the metformin  Follow up if diarrhea or hemorrhoids worsen    COUNSELING:   Reviewed preventive health counseling, as reflected in patient instructions       Regular exercise       Healthy diet/nutrition        "Contraception    Estimated body mass index is 60.79 kg/m  as calculated from the following:    Height as of 9/18/19: 1.589 m (5' 2.56\").    Weight as of this encounter: 153.5 kg (338 lb 6.4 oz).    Weight management plan: Discussed healthy diet and exercise guidelines     reports that she has never smoked. She has never used smokeless tobacco.    Counseling Resources:  ATP IV Guidelines  Pooled Cohorts Equation Calculator  Breast Cancer Risk Calculator  FRAX Risk Assessment  ICSI Preventive Guidelines  Dietary Guidelines for Americans, 2010  USDA's MyPlate  ASA Prophylaxis  Lung CA Screening    Su Travis PA-C  [facility]  "

## 2020-03-14 ASSESSMENT — ANXIETY QUESTIONNAIRES: GAD7 TOTAL SCORE: 6

## 2020-03-16 LAB — HIV 1+2 AB+HIV1 P24 AG SERPL QL IA: NONREACTIVE

## 2020-03-17 LAB
10OH-CARBAZEPINE SERPL-MCNC: 20.4 UG/ML (ref 10–35)
COPATH REPORT: NORMAL
PAP: NORMAL

## 2020-04-02 DIAGNOSIS — Z79.899 HIGH RISK MEDICATION USE: ICD-10-CM

## 2020-04-02 LAB — LITHIUM SERPL-SCNC: 0.79 MMOL/L (ref 0.6–1.2)

## 2020-04-02 PROCEDURE — 36415 COLL VENOUS BLD VENIPUNCTURE: CPT | Performed by: PHYSICIAN ASSISTANT

## 2020-04-02 PROCEDURE — 80178 ASSAY OF LITHIUM: CPT | Performed by: PHYSICIAN ASSISTANT

## 2020-05-15 ENCOUNTER — TELEPHONE (OUTPATIENT)
Dept: FAMILY MEDICINE | Facility: CLINIC | Age: 25
End: 2020-05-15

## 2020-05-15 NOTE — TELEPHONE ENCOUNTER
Metformin was started by outside provider for weight loss.     Okay to discontinue Metformin    Meagan Coleman PA-C

## 2020-05-15 NOTE — TELEPHONE ENCOUNTER
Group home LEFT MESSAGE:  Jane no longer takes Metformin and an order to discontinue metformin needs to be send to [place], [place].  [address] [facility]      Thank you..Nury Daley

## 2020-06-11 ENCOUNTER — ALLIED HEALTH/NURSE VISIT (OUTPATIENT)
Dept: FAMILY MEDICINE | Facility: CLINIC | Age: 25
End: 2020-06-11
Payer: MEDICAID

## 2020-06-11 DIAGNOSIS — Z30.42 DEPO-PROVERA CONTRACEPTIVE STATUS: Primary | ICD-10-CM

## 2020-06-11 PROCEDURE — 99207 ZZC NO CHARGE NURSE ONLY: CPT

## 2020-07-01 ENCOUNTER — TELEPHONE (OUTPATIENT)
Dept: FAMILY MEDICINE | Facility: CLINIC | Age: 25
End: 2020-07-01

## 2020-07-01 DIAGNOSIS — K21.9 GASTROESOPHAGEAL REFLUX DISEASE WITHOUT ESOPHAGITIS: ICD-10-CM

## 2020-07-01 RX ORDER — FAMOTIDINE 40 MG/5ML
20 POWDER, FOR SUSPENSION ORAL AT BEDTIME
Qty: 100 ML | Refills: 3 | Status: SHIPPED | OUTPATIENT
Start: 2020-07-01 | End: 2020-07-20 | Stop reason: ALTCHOICE

## 2020-07-01 NOTE — TELEPHONE ENCOUNTER
I spoke to our pharmacist, they have famotidine in stock so if they are able to go to different pharmacies that is an option.  Other option is tums or I did send in famotidine liquid. Pharmacist recommended this is a good alternative that is typically always in stock, and many adults are switching to it for that reason.  Su Travis PA-C

## 2020-07-01 NOTE — TELEPHONE ENCOUNTER
I received a fax from [facility], that famotidine is on back order.  Asking if we should discontinue or try omeprazole.  Please call patient or caregiver - how often does she use this? We have not really discussed. I assume this is for heartburn, I do not see that she has had other stomach issues in the past, that I am aware.   Would patient be comfortable trying to discontinue use and see if she has recurrence of heartburn?  Su Travis PA-C

## 2020-07-01 NOTE — TELEPHONE ENCOUNTER
Marta notified of all of Katelyn's instructions as noted below. Marta said that to order the liquid to the [facility]. Marta said that she will call back if they want it changed to tablets at the [facility].  Munir Pena RN

## 2020-07-01 NOTE — TELEPHONE ENCOUNTER
Spoke with Marta, Health Manager at [facility]. Marta said that Jane has been taking one Famotidine tablet at bedtime. Marta does not want to stop it as she said Jane complains of heartburn pain when she does not take it. Marta is OK if another heartburn medication is ordered.  Munir Pena RN

## 2020-07-01 NOTE — TELEPHONE ENCOUNTER
Message left on [facility]'s  answering machine to call the [facility] RN back.  Munir Pena RN     Mohs Case Number:  Biopsy Photograph Reviewed: No (no photograph available) Consent Type: Consent 1 (Standard) Eye Shield Used: No Initial Size Of Lesion: 0.4 X Size Of Lesion In Cm (Optional): 0.2 Number Of Stages: 2 Primary Defect Length In Cm (Final Defect Size - Required For Flaps/Grafts): 1.2 Primary Defect Width In Cm (Final Defect Size - Required For Flaps/Grafts): 0.7 Repair Type: Complex Repair Oculoplastic Surgeon Procedure Text (A): After obtaining clear surgical margins the patient was sent to oculoplastics for surgical repair.  The patient understands they will receive post-surgical care and follow-up from the referring physician's office. Otolaryngologist Procedure Text (A): After obtaining clear surgical margins the patient was sent to otolaryngology for surgical repair.  The patient understands they will receive post-surgical care and follow-up from the referring physician's office. Plastic Surgeon Procedure Text (A): After obtaining clear surgical margins the patient was sent to plastics for surgical repair.  The patient understands they will receive post-surgical care and follow-up from the referring physician's office. Mid-Level Procedure Text (A): After obtaining clear surgical margins the patient was sent to a mid-level provider for surgical repair.  The patient understands they will receive post-surgical care and follow-up from the mid-level provider. Provider Procedure Text (A): After obtaining clear surgical margins the defect was repaired by another provider. Asc Procedure Text (A): After obtaining clear surgical margins the patient was sent to an ASC for surgical repair.  The patient understands they will receive post-surgical care and follow-up from the ASC physician. Suturegard Retention Suture: 2-0 Nylon Retention Suture Bite Size: 3 mm Length To Time In Minutes Device Was In Place: 10 Simple / Intermediate / Complex Repair - Final Wound Length In Cm: 2.6 Complex Requirements: Extensive Undermining Performed?: Yes Distance Of Undermining In Cm (Required): 1 Undermining Type: Entire Wound Debridement Text: The wound edges were debrided prior to proceeding with the closure to facilitate wound healing. Helical Rim Text: The closure involved the helical rim. Vermilion Border Text: The closure involved the vermilion border. Nostril Rim Text: The closure involved the nostril rim. Retention Suture Text: Retention sutures were placed to support the closure and prevent dehiscence. Secondary Defect Length In Cm (Required For Flaps): 0 Location Indication Override (Is Already Calculated Based On Selected Body Location): Area H Area H Indication Text: Tumors in this location are included in Area H (eyelids, eyebrows, nose, lips, chin, ear, pre-auricular, post-auricular, temple, genitalia, hands, feet, ankles and areola).  Tissue conservation is critical in these anatomic locations. Area M Indication Text: Tumors in this location are included in Area M (cheek, forehead, scalp, neck, jawline and pretibial skin).  Mohs surgery is indicated for tumors in these anatomic locations. Area L Indication Text: Tumors in this location are included in Area L (trunk and extremities).  Mohs surgery is indicated for larger tumors, or tumors with aggressive histologic features, in these anatomic locations. Tumor Debulked?: curette Special Stains Stage 1 - Results: Base On Clearance Noted Above Stage 2: Additional Anesthesia Type: 1% lidocaine, 0.5% Marcaine, 1:100,000 epinephrine and 8.4% sodium bicarbonate Medical Necessity Statement: Based on my medical judgement, Mohs surgery is the most appropriate treatment for this cancer compared to other treatments. Alternatives Discussed Intro (Do Not Add Period): I discussed alternative treatments to Mohs surgery and specifically discussed the risks and benefits of Consent 1/Introductory Paragraph: The rationale for Mohs was explained to the patient and consent was obtained. The risks, benefits and alternatives to therapy were discussed in detail. Specifically, the risks of infection, scarring, bleeding, prolonged wound healing, incomplete removal, allergy to anesthesia, nerve injury and recurrence were addressed. Prior to the procedure, the treatment site was clearly identified and confirmed by the patient. All components of Universal Protocol/PAUSE Rule completed. Consent 2/Introductory Paragraph: Mohs surgery was explained to the patient and consent was obtained. The risks, benefits and alternatives to therapy were discussed in detail. Specifically, the risks of infection, scarring, bleeding, prolonged wound healing, incomplete removal, allergy to anesthesia, nerve injury and recurrence were addressed. Prior to the procedure, the treatment site was clearly identified and confirmed by the patient. All components of Universal Protocol/PAUSE Rule completed. Consent 3/Introductory Paragraph: I gave the patient a chance to ask questions they had about the procedure.  Following this I explained the Mohs procedure and consent was obtained. The risks, benefits and alternatives to therapy were discussed in detail. Specifically, the risks of infection, scarring, bleeding, prolonged wound healing, incomplete removal, allergy to anesthesia, nerve injury and recurrence were addressed. Prior to the procedure, the treatment site was clearly identified and confirmed by the patient. All components of Universal Protocol/PAUSE Rule completed. Consent (Temporal Branch)/Introductory Paragraph: The rationale for Mohs was explained to the patient and consent was obtained. The risks, benefits and alternatives to therapy were discussed in detail. Specifically, the risks of damage to the temporal branch of the facial nerve, infection, scarring, bleeding, prolonged wound healing, incomplete removal, allergy to anesthesia, and recurrence were addressed. Prior to the procedure, the treatment site was clearly identified and confirmed by the patient. All components of Universal Protocol/PAUSE Rule completed. Consent (Marginal Mandibular)/Introductory Paragraph: The rationale for Mohs was explained to the patient and consent was obtained. The risks, benefits and alternatives to therapy were discussed in detail. Specifically, the risks of damage to the marginal mandibular branch of the facial nerve, infection, scarring, bleeding, prolonged wound healing, incomplete removal, allergy to anesthesia, and recurrence were addressed. Prior to the procedure, the treatment site was clearly identified and confirmed by the patient. All components of Universal Protocol/PAUSE Rule completed. Consent (Spinal Accessory)/Introductory Paragraph: The rationale for Mohs was explained to the patient and consent was obtained. The risks, benefits and alternatives to therapy were discussed in detail. Specifically, the risks of damage to the spinal accessory nerve, infection, scarring, bleeding, prolonged wound healing, incomplete removal, allergy to anesthesia, and recurrence were addressed. Prior to the procedure, the treatment site was clearly identified and confirmed by the patient. All components of Universal Protocol/PAUSE Rule completed. Consent (Near Eyelid Margin)/Introductory Paragraph: The rationale for Mohs was explained to the patient and consent was obtained. The risks, benefits and alternatives to therapy were discussed in detail. Specifically, the risks of ectropion or eyelid deformity, infection, scarring, bleeding, prolonged wound healing, incomplete removal, allergy to anesthesia, nerve injury and recurrence were addressed. Prior to the procedure, the treatment site was clearly identified and confirmed by the patient. All components of Universal Protocol/PAUSE Rule completed. Consent (Ear)/Introductory Paragraph: The rationale for Mohs was explained to the patient and consent was obtained. The risks, benefits and alternatives to therapy were discussed in detail. Specifically, the risks of ear deformity, infection, scarring, bleeding, prolonged wound healing, incomplete removal, allergy to anesthesia, nerve injury and recurrence were addressed. Prior to the procedure, the treatment site was clearly identified and confirmed by the patient. All components of Universal Protocol/PAUSE Rule completed. Consent (Nose)/Introductory Paragraph: The rationale for Mohs was explained to the patient and consent was obtained. The risks, benefits and alternatives to therapy were discussed in detail. Specifically, the risks of nasal deformity, changes in the flow of air through the nose, infection, scarring, bleeding, prolonged wound healing, incomplete removal, allergy to anesthesia, nerve injury and recurrence were addressed. Prior to the procedure, the treatment site was clearly identified and confirmed by the patient. All components of Universal Protocol/PAUSE Rule completed. Consent (Lip)/Introductory Paragraph: The rationale for Mohs was explained to the patient and consent was obtained. The risks, benefits and alternatives to therapy were discussed in detail. Specifically, the risks of lip deformity, changes in the oral aperture, infection, scarring, bleeding, prolonged wound healing, incomplete removal, allergy to anesthesia, nerve injury and recurrence were addressed. Prior to the procedure, the treatment site was clearly identified and confirmed by the patient. All components of Universal Protocol/PAUSE Rule completed. Consent (Scalp)/Introductory Paragraph: The rationale for Mohs was explained to the patient and consent was obtained. The risks, benefits and alternatives to therapy were discussed in detail. Specifically, the risks of changes in hair growth pattern secondary to repair, infection, scarring, bleeding, prolonged wound healing, incomplete removal, allergy to anesthesia, nerve injury and recurrence were addressed. Prior to the procedure, the treatment site was clearly identified and confirmed by the patient. All components of Universal Protocol/PAUSE Rule completed. Detail Level: Detailed Postop Diagnosis: same Surgeon: Jaspreet Jacobson MD Anesthesia Volume In Cc: 3 Additional Anesthesia Volume In Cc: 6 Hemostasis: Electrodesiccation Estimated Blood Loss (Cc): minimal Stage 6: Additional Anesthesia Type: 1% lidocaine with epinephrine Repair Anesthesia Method: local infiltration Undermining Location (Optional): in the deep fat Deep Sutures: 4-0 Vicryl Epidermal Sutures: 5-0 Fast Absorbing Gut Epidermal Closure: running Suturegard Intro: Intraoperative tissue expansion was performed, utilizing the SUTUREGARD device, in order to reduce wound tension. Suturegard Body: The suture ends were repeatedly re-tightened and re-clamped to achieve the desired tissue expansion. Donor Site Anesthesia Type: same as repair anesthesia Graft Donor Site Epidermal Sutures (Optional): 5-0 Ethilon Graft Donor Site Bandage (Optional-Leave Blank If You Don't Want In Note): pressure bandage were applied to the donor site. Closure 2 Information: This tab is for additional flaps and grafts, including complex repair and grafts and complex repair and flaps. You can also specify a different location for the additional defect, if the location is the same you do not need to select a new one. We will insert the automated text for the repair you select below just as we do for solitary flaps and grafts. Please note that at this time if you select a location with a different insurance zone you will need to override the ICD10 and CPT if appropriate. Closure 3 Information: This tab is for additional flaps and grafts above and beyond our usual structured repairs.  Please note if you enter information here it will not currently bill and you will need to add the billing information manually. Wound Care: Petrolatum Dressing: pressure dressing Dressing (No Sutures): dry sterile dressing Unna Boot Text: An Unna boot was placed to help immobilize the limb and facilitate more rapid healing. Home Suture Removal Text: Patient was provided instructions on removing sutures and will remove their sutures at home.  If they have any questions or difficulties they will call the office. Post-Care Instructions: I reviewed with the patient in detail post-care instructions. Patient is not to engage in any heavy lifting, exercise, or swimming for the next 7 days. Should the patient develop any fevers, chills, bleeding, severe pain patient will contact the office immediately. Pain Refusal Text: I offered to prescribe pain medication but the patient refused to take this medication. Mauc Instructions: By selecting yes to the question below the MAUC number will be added into the note.  This will be calculated automatically based on the diagnosis chosen, the size entered, the body zone selected (H,M,L) and the specific indications you chose. You will also have the option to override the Mohs AUC if you disagree with the automatically calculated number and this option is found in the Case Summary tab. Where Do You Want The Question To Include Opioid Counseling Located?: Case Summary Tab Eye Protection Verbiage: Before proceeding with the stage, a plastic scleral shield was inserted. The globe was anesthetized with a few drops of 1% lidocaine with 1:100,000 epinephrine. Then, an appropriate sized scleral shield was chosen and coated with lacrilube ointment. The shield was gently inserted and left in place for the duration of each stage. After the stage was completed, the shield was gently removed. Mohs Method Verbiage: An incision at a 45 degree angle following the standard Mohs approach was done and the specimen was harvested as a microscopic controlled layer. Surgeon/Pathologist Verbiage (Will Incorporate Name Of Surgeon From Intro If Not Blank): operated in two distinct and integrated capacities as the surgeon and pathologist. Mohs Histo Method Verbiage: Each section was then chromacoded and processed in the Mohs lab using the Mohs protocol and submitted for frozen section. Subsequent Stages Histo Method Verbiage: Using a similar technique to that described above, a thin layer of tissue was removed from all areas where tumor was visible on the previous stage.  The tissue was again oriented, mapped, dyed, and processed as above. Mohs Rapid Report Verbiage: The area of clinically evident tumor was marked with skin marking ink and appropriately hatched.  The initial incision was made following the Mohs approach through the skin.  The specimen was taken to the lab, divided into the necessary number of pieces, chromacoded and processed according to the Mohs protocol.  This was repeated in successive stages until a tumor free defect was achieved. Complex Repair Preamble Text (Leave Blank If You Do Not Want): Extensive wide undermining was performed. Intermediate Repair Preamble Text (Leave Blank If You Do Not Want): Undermining was performed with blunt dissection. Non-Graft Cartilage Fenestration Text: The cartilage was fenestrated with a 2mm punch biopsy to help facilitate healing. Graft Cartilage Fenestration Text: The cartilage was fenestrated with a 2mm punch biopsy to help facilitate graft survival and healing. Secondary Intention Text (Leave Blank If You Do Not Want): The defect will heal with secondary intention. No Repair - Repaired With Adjacent Surgical Defect Text (Leave Blank If You Do Not Want): After obtaining clear surgical margins the defect was repaired concurrently with another surgical defect which was in close approximation. Advancement Flap (Single) Text: The defect edges were debeveled with a #15 scalpel blade.  Given the location of the defect and the proximity to free margins a single advancement flap was deemed most appropriate.  Using a sterile surgical marker, an appropriate advancement flap was drawn incorporating the defect and placing the expected incisions within the relaxed skin tension lines where possible.    The area thus outlined was incised deep to adipose tissue with a #15 scalpel blade.  The skin margins were undermined to an appropriate distance in all directions utilizing iris scissors. Advancement Flap (Double) Text: The defect edges were debeveled with a #15 scalpel blade.  Given the location of the defect and the proximity to free margins a double advancement flap was deemed most appropriate.  Using a sterile surgical marker, the appropriate advancement flaps were drawn incorporating the defect and placing the expected incisions within the relaxed skin tension lines where possible.    The area thus outlined was incised deep to adipose tissue with a #15 scalpel blade.  The skin margins were undermined to an appropriate distance in all directions utilizing iris scissors. Burow's Advancement Flap Text: The defect edges were debeveled with a #15 scalpel blade.  Given the location of the defect and the proximity to free margins a Burow's advancement flap was deemed most appropriate.  Using a sterile surgical marker, the appropriate advancement flap was drawn incorporating the defect and placing the expected incisions within the relaxed skin tension lines where possible.    The area thus outlined was incised deep to adipose tissue with a #15 scalpel blade.  The skin margins were undermined to an appropriate distance in all directions utilizing iris scissors. Chonodrocutaneous Helical Advancement Flap Text: The defect edges were debeveled with a #15 scalpel blade.  Given the location of the defect and the proximity to free margins a chondrocutaneous helical advancement flap was deemed most appropriate.  Using a sterile surgical marker, the appropriate advancement flap was drawn incorporating the defect and placing the expected incisions within the relaxed skin tension lines where possible.    The area thus outlined was incised deep to adipose tissue with a #15 scalpel blade.  The skin margins were undermined to an appropriate distance in all directions utilizing iris scissors. Crescentic Advancement Flap Text: The defect edges were debeveled with a #15 scalpel blade.  Given the location of the defect and the proximity to free margins a crescentic advancement flap was deemed most appropriate.  Using a sterile surgical marker, the appropriate advancement flap was drawn incorporating the defect and placing the expected incisions within the relaxed skin tension lines where possible.    The area thus outlined was incised deep to adipose tissue with a #15 scalpel blade.  The skin margins were undermined to an appropriate distance in all directions utilizing iris scissors. A-T Advancement Flap Text: The defect edges were debeveled with a #15 scalpel blade.  Given the location of the defect, shape of the defect and the proximity to free margins an A-T advancement flap was deemed most appropriate.  Using a sterile surgical marker, an appropriate advancement flap was drawn incorporating the defect and placing the expected incisions within the relaxed skin tension lines where possible.    The area thus outlined was incised deep to adipose tissue with a #15 scalpel blade.  The skin margins were undermined to an appropriate distance in all directions utilizing iris scissors. O-T Advancement Flap Text: The defect edges were debeveled with a #15 scalpel blade.  Given the location of the defect, shape of the defect and the proximity to free margins an O-T advancement flap was deemed most appropriate.  Using a sterile surgical marker, an appropriate advancement flap was drawn incorporating the defect and placing the expected incisions within the relaxed skin tension lines where possible.    The area thus outlined was incised deep to adipose tissue with a #15 scalpel blade.  The skin margins were undermined to an appropriate distance in all directions utilizing iris scissors. O-L Flap Text: The defect edges were debeveled with a #15 scalpel blade.  Given the location of the defect, shape of the defect and the proximity to free margins an O-L flap was deemed most appropriate.  Using a sterile surgical marker, an appropriate advancement flap was drawn incorporating the defect and placing the expected incisions within the relaxed skin tension lines where possible.    The area thus outlined was incised deep to adipose tissue with a #15 scalpel blade.  The skin margins were undermined to an appropriate distance in all directions utilizing iris scissors. O-Z Flap Text: The defect edges were debeveled with a #15 scalpel blade.  Given the location of the defect, shape of the defect and the proximity to free margins an O-Z flap was deemed most appropriate.  Using a sterile surgical marker, an appropriate transposition flap was drawn incorporating the defect and placing the expected incisions within the relaxed skin tension lines where possible. The area thus outlined was incised deep to adipose tissue with a #15 scalpel blade.  The skin margins were undermined to an appropriate distance in all directions utilizing iris scissors. Double O-Z Flap Text: The defect edges were debeveled with a #15 scalpel blade.  Given the location of the defect, shape of the defect and the proximity to free margins a Double O-Z flap was deemed most appropriate.  Using a sterile surgical marker, an appropriate transposition flap was drawn incorporating the defect and placing the expected incisions within the relaxed skin tension lines where possible. The area thus outlined was incised deep to adipose tissue with a #15 scalpel blade.  The skin margins were undermined to an appropriate distance in all directions utilizing iris scissors. V-Y Flap Text: The defect edges were debeveled with a #15 scalpel blade.  Given the location of the defect, shape of the defect and the proximity to free margins a V-Y flap was deemed most appropriate.  Using a sterile surgical marker, an appropriate advancement flap was drawn incorporating the defect and placing the expected incisions within the relaxed skin tension lines where possible.    The area thus outlined was incised deep to adipose tissue with a #15 scalpel blade.  The skin margins were undermined to an appropriate distance in all directions utilizing iris scissors. Advancement-Rotation Flap Text: The defect edges were debeveled with a #15 scalpel blade.  Given the location of the defect, shape of the defect and the proximity to free margins an advancement-rotation flap was deemed most appropriate.  Using a sterile surgical marker, an appropriate flap was drawn incorporating the defect and placing the expected incisions within the relaxed skin tension lines where possible. The area thus outlined was incised deep to adipose tissue with a #15 scalpel blade.  The skin margins were undermined to an appropriate distance in all directions utilizing iris scissors. Mercedes Flap Text: The defect edges were debeveled with a #15 scalpel blade.  Given the location of the defect, shape of the defect and the proximity to free margins a Mercedes flap was deemed most appropriate.  Using a sterile surgical marker, an appropriate advancement flap was drawn incorporating the defect and placing the expected incisions within the relaxed skin tension lines where possible. The area thus outlined was incised deep to adipose tissue with a #15 scalpel blade.  The skin margins were undermined to an appropriate distance in all directions utilizing iris scissors. Modified Advancement Flap Text: The defect edges were debeveled with a #15 scalpel blade.  Given the location of the defect, shape of the defect and the proximity to free margins a modified advancement flap was deemed most appropriate.  Using a sterile surgical marker, an appropriate advancement flap was drawn incorporating the defect and placing the expected incisions within the relaxed skin tension lines where possible.    The area thus outlined was incised deep to adipose tissue with a #15 scalpel blade.  The skin margins were undermined to an appropriate distance in all directions utilizing iris scissors. Mucosal Advancement Flap Text: Given the location of the defect, shape of the defect and the proximity to free margins a mucosal advancement flap was deemed most appropriate. Incisions were made with a 15 blade scalpel in the appropriate fashion along the cutaneous vermilion border and the mucosal lip. The remaining actinically damaged mucosal tissue was excised.  The mucosal advancement flap was then elevated to the gingival sulcus with care taken to preserve the neurovascular structures and advanced into the primary defect. Care was taken to ensure that precise realignment of the vermilion border was achieved. Hatchet Flap Text: The defect edges were debeveled with a #15 scalpel blade.  Given the location of the defect, shape of the defect and the proximity to free margins a hatchet flap was deemed most appropriate.  Using a sterile surgical marker, an appropriate hatchet flap was drawn incorporating the defect and placing the expected incisions within the relaxed skin tension lines where possible.    The area thus outlined was incised deep to adipose tissue with a #15 scalpel blade.  The skin margins were undermined to an appropriate distance in all directions utilizing iris scissors. Rotation Flap Text: The defect edges were debeveled with a #15 scalpel blade.  Given the location of the defect, shape of the defect and the proximity to free margins a rotation flap was deemed most appropriate.  Using a sterile surgical marker, an appropriate rotation flap was drawn incorporating the defect and placing the expected incisions within the relaxed skin tension lines where possible.    The area thus outlined was incised deep to adipose tissue with a #15 scalpel blade.  The skin margins were undermined to an appropriate distance in all directions utilizing iris scissors. Spiral Flap Text: The defect edges were debeveled with a #15 scalpel blade.  Given the location of the defect, shape of the defect and the proximity to free margins a spiral flap was deemed most appropriate.  Using a sterile surgical marker, an appropriate rotation flap was drawn incorporating the defect and placing the expected incisions within the relaxed skin tension lines where possible. The area thus outlined was incised deep to adipose tissue with a #15 scalpel blade.  The skin margins were undermined to an appropriate distance in all directions utilizing iris scissors. Star Wedge Flap Text: The defect edges were debeveled with a #15 scalpel blade.  Given the location of the defect, shape of the defect and the proximity to free margins a star wedge flap was deemed most appropriate.  Using a sterile surgical marker, an appropriate rotation flap was drawn incorporating the defect and placing the expected incisions within the relaxed skin tension lines where possible. The area thus outlined was incised deep to adipose tissue with a #15 scalpel blade.  The skin margins were undermined to an appropriate distance in all directions utilizing iris scissors. Transposition Flap Text: The defect edges were debeveled with a #15 scalpel blade.  Given the location of the defect and the proximity to free margins a transposition flap was deemed most appropriate.  Using a sterile surgical marker, an appropriate transposition flap was drawn incorporating the defect.    The area thus outlined was incised deep to adipose tissue with a #15 scalpel blade.  The skin margins were undermined to an appropriate distance in all directions utilizing iris scissors. Muscle Hinge Flap Text: The defect edges were debeveled with a #15 scalpel blade.  Given the size, depth and location of the defect and the proximity to free margins a muscle hinge flap was deemed most appropriate.  Using a sterile surgical marker, an appropriate hinge flap was drawn incorporating the defect. The area thus outlined was incised with a #15 scalpel blade.  The skin margins were undermined to an appropriate distance in all directions utilizing iris scissors. Melolabial Transposition Flap Text: The defect edges were debeveled with a #15 scalpel blade.  Given the location of the defect and the proximity to free margins a melolabial flap was deemed most appropriate.  Using a sterile surgical marker, an appropriate melolabial transposition flap was drawn incorporating the defect.    The area thus outlined was incised deep to adipose tissue with a #15 scalpel blade.  The skin margins were undermined to an appropriate distance in all directions utilizing iris scissors. Rhombic Flap Text: The defect edges were debeveled with a #15 scalpel blade.  Given the location of the defect and the proximity to free margins a rhombic flap was deemed most appropriate.  Using a sterile surgical marker, an appropriate rhombic flap was drawn incorporating the defect.    The area thus outlined was incised deep to adipose tissue with a #15 scalpel blade.  The skin margins were undermined to an appropriate distance in all directions utilizing iris scissors. Rhomboid Transposition Flap Text: The defect edges were debeveled with a #15 scalpel blade.  Given the location of the defect and the proximity to free margins a rhomboid transposition flap was deemed most appropriate.  Using a sterile surgical marker, an appropriate rhomboid flap was drawn incorporating the defect.    The area thus outlined was incised deep to adipose tissue with a #15 scalpel blade.  The skin margins were undermined to an appropriate distance in all directions utilizing iris scissors. Bi-Rhombic Flap Text: The defect edges were debeveled with a #15 scalpel blade.  Given the location of the defect and the proximity to free margins a bi-rhombic flap was deemed most appropriate.  Using a sterile surgical marker, an appropriate rhombic flap was drawn incorporating the defect. The area thus outlined was incised deep to adipose tissue with a #15 scalpel blade.  The skin margins were undermined to an appropriate distance in all directions utilizing iris scissors. Helical Rim Advancement Flap Text: The defect edges were debeveled with a #15 blade scalpel.  Given the location of the defect and the proximity to free margins (helical rim) a double helical rim advancement flap was deemed most appropriate.  Using a sterile surgical marker, the appropriate advancement flaps were drawn incorporating the defect and placing the expected incisions between the helical rim and antihelix where possible.  The area thus outlined was incised through and through with a #15 scalpel blade.  With a skin hook and iris scissors, the flaps were gently and sharply undermined and freed up. Bilateral Helical Rim Advancement Flap Text: The defect edges were debeveled with a #15 blade scalpel.  Given the location of the defect and the proximity to free margins (helical rim) a bilateral helical rim advancement flap was deemed most appropriate.  Using a sterile surgical marker, the appropriate advancement flaps were drawn incorporating the defect and placing the expected incisions between the helical rim and antihelix where possible.  The area thus outlined was incised through and through with a #15 scalpel blade.  With a skin hook and iris scissors, the flaps were gently and sharply undermined and freed up. Ear Star Wedge Flap Text: The defect edges were debeveled with a #15 blade scalpel.  Given the location of the defect and the proximity to free margins (helical rim) an ear star wedge flap was deemed most appropriate.  Using a sterile surgical marker, the appropriate flap was drawn incorporating the defect and placing the expected incisions between the helical rim and antihelix where possible.  The area thus outlined was incised through and through with a #15 scalpel blade. Banner Transposition Flap Text: The defect edges were debeveled with a #15 scalpel blade.  Given the location of the defect and the proximity to free margins a Banner transposition flap was deemed most appropriate.  Using a sterile surgical marker, an appropriate flap drawn around the defect. The area thus outlined was incised deep to adipose tissue with a #15 scalpel blade.  The skin margins were undermined to an appropriate distance in all directions utilizing iris scissors. Bilobed Flap Text: The defect edges were debeveled with a #15 scalpel blade.  Given the location of the defect and the proximity to free margins a bilobe flap was deemed most appropriate.  Using a sterile surgical marker, an appropriate bilobe flap drawn around the defect.    The area thus outlined was incised deep to adipose tissue with a #15 scalpel blade.  The skin margins were undermined to an appropriate distance in all directions utilizing iris scissors. Bilobed Transposition Flap Text: The defect edges were debeveled with a #15 scalpel blade.  Given the location of the defect and the proximity to free margins a bilobed transposition flap was deemed most appropriate.  Using a sterile surgical marker, an appropriate bilobe flap drawn around the defect.    The area thus outlined was incised deep to adipose tissue with a #15 scalpel blade.  The skin margins were undermined to an appropriate distance in all directions utilizing iris scissors. Trilobed Flap Text: The defect edges were debeveled with a #15 scalpel blade.  Given the location of the defect and the proximity to free margins a trilobed flap was deemed most appropriate.  Using a sterile surgical marker, an appropriate trilobed flap drawn around the defect.    The area thus outlined was incised deep to adipose tissue with a #15 scalpel blade.  The skin margins were undermined to an appropriate distance in all directions utilizing iris scissors. Dorsal Nasal Flap Text: The defect edges were debeveled with a #15 scalpel blade.  Given the location of the defect and the proximity to free margins a dorsal nasal flap was deemed most appropriate.  Using a sterile surgical marker, an appropriate dorsal nasal flap was drawn around the defect.    The area thus outlined was incised deep to adipose tissue with a #15 scalpel blade.  The skin margins were undermined to an appropriate distance in all directions utilizing iris scissors. Island Pedicle Flap Text: The defect edges were debeveled with a #15 scalpel blade.  Given the location of the defect, shape of the defect and the proximity to free margins an island pedicle advancement flap was deemed most appropriate.  Using a sterile surgical marker, an appropriate advancement flap was drawn incorporating the defect, outlining the appropriate donor tissue and placing the expected incisions within the relaxed skin tension lines where possible.    The area thus outlined was incised deep to adipose tissue with a #15 scalpel blade.  The skin margins were undermined to an appropriate distance in all directions around the primary defect and laterally outward around the island pedicle utilizing iris scissors.  There was minimal undermining beneath the pedicle flap. Island Pedicle Flap With Canthal Suspension Text: The defect edges were debeveled with a #15 scalpel blade.  Given the location of the defect, shape of the defect and the proximity to free margins an island pedicle advancement flap was deemed most appropriate.  Using a sterile surgical marker, an appropriate advancement flap was drawn incorporating the defect, outlining the appropriate donor tissue and placing the expected incisions within the relaxed skin tension lines where possible. The area thus outlined was incised deep to adipose tissue with a #15 scalpel blade.  The skin margins were undermined to an appropriate distance in all directions around the primary defect and laterally outward around the island pedicle utilizing iris scissors.  There was minimal undermining beneath the pedicle flap. A suspension suture was placed in the canthal tendon to prevent tension and prevent ectropion. Alar Island Pedicle Flap Text: The defect edges were debeveled with a #15 scalpel blade.  Given the location of the defect, shape of the defect and the proximity to the alar rim an island pedicle advancement flap was deemed most appropriate.  Using a sterile surgical marker, an appropriate advancement flap was drawn incorporating the defect, outlining the appropriate donor tissue and placing the expected incisions within the nasal ala running parallel to the alar rim. The area thus outlined was incised with a #15 scalpel blade.  The skin margins were undermined minimally to an appropriate distance in all directions around the primary defect and laterally outward around the island pedicle utilizing iris scissors.  There was minimal undermining beneath the pedicle flap. Double Island Pedicle Flap Text: The defect edges were debeveled with a #15 scalpel blade.  Given the location of the defect, shape of the defect and the proximity to free margins a double island pedicle advancement flap was deemed most appropriate.  Using a sterile surgical marker, an appropriate advancement flap was drawn incorporating the defect, outlining the appropriate donor tissue and placing the expected incisions within the relaxed skin tension lines where possible.    The area thus outlined was incised deep to adipose tissue with a #15 scalpel blade.  The skin margins were undermined to an appropriate distance in all directions around the primary defect and laterally outward around the island pedicle utilizing iris scissors.  There was minimal undermining beneath the pedicle flap. Island Pedicle Flap-Requiring Vessel Identification Text: The defect edges were debeveled with a #15 scalpel blade.  Given the location of the defect, shape of the defect and the proximity to free margins an island pedicle advancement flap was deemed most appropriate.  Using a sterile surgical marker, an appropriate advancement flap was drawn, based on the axial vessel mentioned above, incorporating the defect, outlining the appropriate donor tissue and placing the expected incisions within the relaxed skin tension lines where possible.    The area thus outlined was incised deep to adipose tissue with a #15 scalpel blade.  The skin margins were undermined to an appropriate distance in all directions around the primary defect and laterally outward around the island pedicle utilizing iris scissors.  There was minimal undermining beneath the pedicle flap. Keystone Flap Text: The defect edges were debeveled with a #15 scalpel blade.  Given the location of the defect, shape of the defect a keystone flap was deemed most appropriate.  Using a sterile surgical marker, an appropriate keystone flap was drawn incorporating the defect, outlining the appropriate donor tissue and placing the expected incisions within the relaxed skin tension lines where possible. The area thus outlined was incised deep to adipose tissue with a #15 scalpel blade.  The skin margins were undermined to an appropriate distance in all directions around the primary defect and laterally outward around the flap utilizing iris scissors. O-T Plasty Text: The defect edges were debeveled with a #15 scalpel blade.  Given the location of the defect, shape of the defect and the proximity to free margins an O-T plasty was deemed most appropriate.  Using a sterile surgical marker, an appropriate O-T plasty was drawn incorporating the defect and placing the expected incisions within the relaxed skin tension lines where possible.    The area thus outlined was incised deep to adipose tissue with a #15 scalpel blade.  The skin margins were undermined to an appropriate distance in all directions utilizing iris scissors. O-Z Plasty Text: The defect edges were debeveled with a #15 scalpel blade.  Given the location of the defect, shape of the defect and the proximity to free margins an O-Z plasty (double transposition flap) was deemed most appropriate.  Using a sterile surgical marker, the appropriate transposition flaps were drawn incorporating the defect and placing the expected incisions within the relaxed skin tension lines where possible.    The area thus outlined was incised deep to adipose tissue with a #15 scalpel blade.  The skin margins were undermined to an appropriate distance in all directions utilizing iris scissors.  Hemostasis was achieved with electrocautery.  The flaps were then transposed into place, one clockwise and the other counterclockwise, and anchored with interrupted buried subcutaneous sutures. Double O-Z Plasty Text: The defect edges were debeveled with a #15 scalpel blade.  Given the location of the defect, shape of the defect and the proximity to free margins a Double O-Z plasty (double transposition flap) was deemed most appropriate.  Using a sterile surgical marker, the appropriate transposition flaps were drawn incorporating the defect and placing the expected incisions within the relaxed skin tension lines where possible. The area thus outlined was incised deep to adipose tissue with a #15 scalpel blade.  The skin margins were undermined to an appropriate distance in all directions utilizing iris scissors.  Hemostasis was achieved with electrocautery.  The flaps were then transposed into place, one clockwise and the other counterclockwise, and anchored with interrupted buried subcutaneous sutures. S Plasty Text: Given the location and shape of the defect, and the orientation of relaxed skin tension lines, an S-plasty was deemed most appropriate for repair.  Using a sterile surgical marker, the appropriate outline of the S-plasty was drawn, incorporating the defect and placing the expected incisions within the relaxed skin tension lines where possible.  The area thus outlined was incised deep to adipose tissue with a #15 scalpel blade.  The skin margins were undermined to an appropriate distance in all directions utilizing iris scissors. The skin flaps were advanced over the defect.  The opposing margins were then approximated with interrupted buried subcutaneous sutures. V-Y Plasty Text: The defect edges were debeveled with a #15 scalpel blade.  Given the location of the defect, shape of the defect and the proximity to free margins an V-Y advancement flap was deemed most appropriate.  Using a sterile surgical marker, an appropriate advancement flap was drawn incorporating the defect and placing the expected incisions within the relaxed skin tension lines where possible.    The area thus outlined was incised deep to adipose tissue with a #15 scalpel blade.  The skin margins were undermined to an appropriate distance in all directions utilizing iris scissors. H Plasty Text: Given the location of the defect, shape of the defect and the proximity to free margins a H-plasty was deemed most appropriate for repair.  Using a sterile surgical marker, the appropriate advancement arms of the H-plasty were drawn incorporating the defect and placing the expected incisions within the relaxed skin tension lines where possible. The area thus outlined was incised deep to adipose tissue with a #15 scalpel blade. The skin margins were undermined to an appropriate distance in all directions utilizing iris scissors.  The opposing advancement arms were then advanced into place in opposite direction and anchored with interrupted buried subcutaneous sutures. W Plasty Text: The lesion was extirpated to the level of the fat with a #15 scalpel blade.  Given the location of the defect, shape of the defect and the proximity to free margins a W-plasty was deemed most appropriate for repair.  Using a sterile surgical marker, the appropriate transposition arms of the W-plasty were drawn incorporating the defect and placing the expected incisions within the relaxed skin tension lines where possible.    The area thus outlined was incised deep to adipose tissue with a #15 scalpel blade.  The skin margins were undermined to an appropriate distance in all directions utilizing iris scissors.  The opposing transposition arms were then transposed into place in opposite direction and anchored with interrupted buried subcutaneous sutures. Z Plasty Text: The lesion was extirpated to the level of the fat with a #15 scalpel blade.  Given the location of the defect, shape of the defect and the proximity to free margins a Z-plasty was deemed most appropriate for repair.  Using a sterile surgical marker, the appropriate transposition arms of the Z-plasty were drawn incorporating the defect and placing the expected incisions within the relaxed skin tension lines where possible.    The area thus outlined was incised deep to adipose tissue with a #15 scalpel blade.  The skin margins were undermined to an appropriate distance in all directions utilizing iris scissors.  The opposing transposition arms were then transposed into place in opposite direction and anchored with interrupted buried subcutaneous sutures. Cheek Interpolation Flap Text: A decision was made to reconstruct the defect utilizing an interpolation axial flap and a staged reconstruction.  A telfa template was made of the defect.  This telfa template was then used to outline the Cheek Interpolation flap.  The donor area for the pedicle flap was then injected with anesthesia.  The flap was excised through the skin and subcutaneous tissue down to the layer of the underlying musculature.  The interpolation flap was carefully excised within this deep plane to maintain its blood supply.  The edges of the donor site were undermined.   The donor site was closed in a primary fashion.  The pedicle was then rotated into position and sutured.  Once the tube was sutured into place, adequate blood supply was confirmed with blanching and refill.  The pedicle was then wrapped with xeroform gauze and dressed appropriately with a telfa and gauze bandage to ensure continued blood supply and protect the attached pedicle. Cheek-To-Nose Interpolation Flap Text: A decision was made to reconstruct the defect utilizing an interpolation axial flap and a staged reconstruction.  A telfa template was made of the defect.  This telfa template was then used to outline the Cheek-To-Nose Interpolation flap.  The donor area for the pedicle flap was then injected with anesthesia.  The flap was excised through the skin and subcutaneous tissue down to the layer of the underlying musculature.  The interpolation flap was carefully excised within this deep plane to maintain its blood supply.  The edges of the donor site were undermined.   The donor site was closed in a primary fashion.  The pedicle was then rotated into position and sutured.  Once the tube was sutured into place, adequate blood supply was confirmed with blanching and refill.  The pedicle was then wrapped with xeroform gauze and dressed appropriately with a telfa and gauze bandage to ensure continued blood supply and protect the attached pedicle. Interpolation Flap Text: A decision was made to reconstruct the defect utilizing an interpolation axial flap and a staged reconstruction.  A telfa template was made of the defect.  This telfa template was then used to outline the interpolation flap.  The donor area for the pedicle flap was then injected with anesthesia.  The flap was excised through the skin and subcutaneous tissue down to the layer of the underlying musculature.  The interpolation flap was carefully excised within this deep plane to maintain its blood supply.  The edges of the donor site were undermined.   The donor site was closed in a primary fashion.  The pedicle was then rotated into position and sutured.  Once the tube was sutured into place, adequate blood supply was confirmed with blanching and refill.  The pedicle was then wrapped with xeroform gauze and dressed appropriately with a telfa and gauze bandage to ensure continued blood supply and protect the attached pedicle. Melolabial Interpolation Flap Text: A decision was made to reconstruct the defect utilizing an interpolation axial flap and a staged reconstruction.  A telfa template was made of the defect.  This telfa template was then used to outline the melolabial interpolation flap.  The donor area for the pedicle flap was then injected with anesthesia.  The flap was excised through the skin and subcutaneous tissue down to the layer of the underlying musculature.  The pedicle flap was carefully excised within this deep plane to maintain its blood supply.  The edges of the donor site were undermined.   The donor site was closed in a primary fashion.  The pedicle was then rotated into position and sutured.  Once the tube was sutured into place, adequate blood supply was confirmed with blanching and refill.  The pedicle was then wrapped with xeroform gauze and dressed appropriately with a telfa and gauze bandage to ensure continued blood supply and protect the attached pedicle. Mastoid Interpolation Flap Text: A decision was made to reconstruct the defect utilizing an interpolation axial flap and a staged reconstruction.  A telfa template was made of the defect.  This telfa template was then used to outline the mastoid interpolation flap.  The donor area for the pedicle flap was then injected with anesthesia.  The flap was excised through the skin and subcutaneous tissue down to the layer of the underlying musculature.  The pedicle flap was carefully excised within this deep plane to maintain its blood supply.  The edges of the donor site were undermined.   The donor site was closed in a primary fashion.  The pedicle was then rotated into position and sutured.  Once the tube was sutured into place, adequate blood supply was confirmed with blanching and refill.  The pedicle was then wrapped with xeroform gauze and dressed appropriately with a telfa and gauze bandage to ensure continued blood supply and protect the attached pedicle. Posterior Auricular Interpolation Flap Text: A decision was made to reconstruct the defect utilizing an interpolation axial flap and a staged reconstruction.  A telfa template was made of the defect.  This telfa template was then used to outline the posterior auricular interpolation flap.  The donor area for the pedicle flap was then injected with anesthesia.  The flap was excised through the skin and subcutaneous tissue down to the layer of the underlying musculature.  The pedicle flap was carefully excised within this deep plane to maintain its blood supply.  The edges of the donor site were undermined.   The donor site was closed in a primary fashion.  The pedicle was then rotated into position and sutured.  Once the tube was sutured into place, adequate blood supply was confirmed with blanching and refill.  The pedicle was then wrapped with xeroform gauze and dressed appropriately with a telfa and gauze bandage to ensure continued blood supply and protect the attached pedicle. Paramedian Forehead Flap Text: A decision was made to reconstruct the defect utilizing an interpolation axial flap and a staged reconstruction.  A telfa template was made of the defect.  This telfa template was then used to outline the paramedian forehead pedicle flap.  The donor area for the pedicle flap was then injected with anesthesia.  The flap was excised through the skin and subcutaneous tissue down to the layer of the underlying musculature.  The pedicle flap was carefully excised within this deep plane to maintain its blood supply.  The edges of the donor site were undermined.   The donor site was closed in a primary fashion.  The pedicle was then rotated into position and sutured.  Once the tube was sutured into place, adequate blood supply was confirmed with blanching and refill.  The pedicle was then wrapped with xeroform gauze and dressed appropriately with a telfa and gauze bandage to ensure continued blood supply and protect the attached pedicle. Cheiloplasty (Less Than 50%) Text: A decision was made to reconstruct the defect with a  cheiloplasty.  The defect was undermined extensively.  Additional obicularis oris muscle was excised with a 15 blade scalpel.  The defect was converted into a full thickness wedge, of less than 50% of the vertical height of the lip, to facilite a better cosmetic result.  Small vessels were then tied off with 5-0 monocyrl. The obicularis oris, superficial fascia, adipose and dermis were then reapproximated.  After the deeper layers were approximated the epidermis was reapproximated with particular care given to realign the vermilion border. Cheiloplasty (Complex) Text: A decision was made to reconstruct the defect with a  cheiloplasty.  The defect was undermined extensively.  Additional obicularis oris muscle was excised with a 15 blade scalpel.  The defect was converted into a full thickness wedge to facilite a better cosmetic result.  Small vessels were then tied off with 5-0 monocyrl. The obicularis oris, superficial fascia, adipose and dermis were then reapproximated.  After the deeper layers were approximated the epidermis was reapproximated with particular care given to realign the vermilion border. Ear Wedge Repair Text: A wedge excision was completed by carrying down an excision through the full thickness of the ear and cartilage with an inward facing Burow's triangle. The wound was then closed in a layered fashion. Full Thickness Lip Wedge Repair (Flap) Text: Given the location of the defect and the proximity to free margins a full thickness wedge repair was deemed most appropriate.  Using a sterile surgical marker, the appropriate repair was drawn incorporating the defect and placing the expected incisions perpendicular to the vermilion border.  The vermilion border was also meticulously outlined to ensure appropriate reapproximation during the repair.  The area thus outlined was incised through and through with a #15 scalpel blade.  The muscularis and dermis were reaproximated with deep sutures following hemostasis. Care was taken to realign the vermilion border before proceeding with the superficial closure.  Once the vermilion was realigned the superfical and mucosal closure was finished. Ftsg Text: The defect edges were debeveled with a #15 scalpel blade.  Given the location of the defect, shape of the defect and the proximity to free margins a full thickness skin graft was deemed most appropriate.  Using a sterile surgical marker, the primary defect shape was transferred to the donor site. The area thus outlined was incised deep to adipose tissue with a #15 scalpel blade.  The harvested graft was then trimmed of adipose tissue until only dermis and epidermis was left.  The skin margins of the secondary defect were undermined to an appropriate distance in all directions utilizing iris scissors.  The secondary defect was closed with interrupted buried subcutaneous sutures.  The skin edges were then re-apposed with running  sutures.  The skin graft was then placed in the primary defect and oriented appropriately. Split-Thickness Skin Graft Text: The defect edges were debeveled with a #15 scalpel blade.  Given the location of the defect, shape of the defect and the proximity to free margins a split thickness skin graft was deemed most appropriate.  Using a sterile surgical marker, the primary defect shape was transferred to the donor site. The split thickness graft was then harvested.  The skin graft was then placed in the primary defect and oriented appropriately. Cartilage Graft Text: The defect edges were debeveled with a #15 scalpel blade.  Given the location of the defect, shape of the defect, the fact the defect involved a full thickness cartilage defect a cartilage graft was deemed most appropriate.  An appropriate donor site was identified, cleansed, and anesthetized. The cartilage graft was then harvested and transferred to the recipient site, oriented appropriately and then sutured into place.  The secondary defect was then repaired using a primary closure. Composite Graft Text: The defect edges were debeveled with a #15 scalpel blade.  Given the location of the defect, shape of the defect, the proximity to free margins and the fact the defect was full thickness a composite graft was deemed most appropriate.  The defect was outline and then transferred to the donor site.  A full thickness graft was then excised from the donor site. The graft was then placed in the primary defect, oriented appropriately and then sutured into place.  The secondary defect was then repaired using a primary closure. Epidermal Autograft Text: The defect edges were debeveled with a #15 scalpel blade.  Given the location of the defect, shape of the defect and the proximity to free margins an epidermal autograft was deemed most appropriate.  Using a sterile surgical marker, the primary defect shape was transferred to the donor site. The epidermal graft was then harvested.  The skin graft was then placed in the primary defect and oriented appropriately. Dermal Autograft Text: The defect edges were debeveled with a #15 scalpel blade.  Given the location of the defect, shape of the defect and the proximity to free margins a dermal autograft was deemed most appropriate.  Using a sterile surgical marker, the primary defect shape was transferred to the donor site. The area thus outlined was incised deep to adipose tissue with a #15 scalpel blade.  The harvested graft was then trimmed of adipose and epidermal tissue until only dermis was left.  The skin graft was then placed in the primary defect and oriented appropriately. Skin Substitute Text: The defect edges were debeveled with a #15 scalpel blade.  Given the location of the defect, shape of the defect and the proximity to free margins a skin substitute graft was deemed most appropriate.  The graft material was trimmed to fit the size of the defect. The graft was then placed in the primary defect and oriented appropriately. Tissue Cultured Epidermal Autograft Text: The defect edges were debeveled with a #15 scalpel blade.  Given the location of the defect, shape of the defect and the proximity to free margins a tissue cultured epidermal autograft was deemed most appropriate.  The graft was then trimmed to fit the size of the defect.  The graft was then placed in the primary defect and oriented appropriately. Xenograft Text: The defect edges were debeveled with a #15 scalpel blade.  Given the location of the defect, shape of the defect and the proximity to free margins a xenograft was deemed most appropriate.  The graft was then trimmed to fit the size of the defect.  The graft was then placed in the primary defect and oriented appropriately. Purse String (Simple) Text: Given the location of the defect and the characteristics of the surrounding skin a pursestring closure was deemed most appropriate.  Undermining was performed circumfirentially around the surgical defect.  A purstring suture was then placed and tightened. Purse String (Intermediate) Text: Given the location of the defect and the characteristics of the surrounding skin a pursestring intermediate closure was deemed most appropriate.  Undermining was performed circumfirentially around the surgical defect.  A purstring suture was then placed and tightened. Partial Purse String (Simple) Text: Given the location of the defect and the characteristics of the surrounding skin a simple purse string closure was deemed most appropriate.  Undermining was performed circumfirentially around the surgical defect.  A purse string suture was then placed and tightened. Wound tension only allowed a partial closure of the circular defect. Partial Purse String (Intermediate) Text: Given the location of the defect and the characteristics of the surrounding skin an intermediate purse string closure was deemed most appropriate.  Undermining was performed circumfirentially around the surgical defect.  A purse string suture was then placed and tightened. Wound tension only allowed a partial closure of the circular defect. Localized Dermabrasion With Wire Brush Text: The patient was draped in routine manner.  Localized dermabrasion using 3 x 17 mm wire brush was performed in routine manner to papillary dermis. This spot dermabrasion is being performed to complete skin cancer reconstruction. It also will eliminate the other sun damaged precancerous cells that are known to be part of the regional effect of a lifetime's worth of sun exposure. This localized dermabrasion is therapeutic and should not be considered cosmetic in any regard. Tarsorrhaphy Text: A tarsorrhaphy was performed using Frost sutures. Complex Repair And Flap Additional Text (Will Appearing After The Standard Complex Repair Text): The complex repair was not sufficient to completely close the primary defect. The remaining additional defect was repaired with the flap mentioned below. Complex Repair And Graft Additional Text (Will Appearing After The Standard Complex Repair Text): The complex repair was not sufficient to completely close the primary defect. The remaining additional defect was repaired with the graft mentioned below. Manual Repair Warning Statement: We plan on removing the manually selected variable below in favor of our much easier automatic structured text blocks found in the previous tab. We decided to do this to help make the flow better and give you the full power of structured data. Manual selection is never going to be ideal in our platform and I would encourage you to avoid using manual selection from this point on, especially since I will be sunsetting this feature. It is important that you do one of two things with the customized text below. First, you can save all of the text in a word file so you can have it for future reference. Second, transfer the text to the appropriate area in the Library tab. Lastly, if there is a flap or graft type which we do not have you need to let us know right away so I can add it in before the variable is hidden. No need to panic, we plan to give you roughly 6 months to make the change. Same Histology In Subsequent Stages Text: The pattern and morphology of the tumor is as described in the first stage. No Residual Tumor Seen Histology Text: There were no malignant cells seen in the sections examined. Inflammation Suggestive Of Cancer Camouflage Histology Text: There was a dense lymphocytic infiltrate which prevented adequate histologic evaluation of adjacent structures. Bcc Histology Text: There were numerous aggregates of basaloid cells. Bcc Infiltrative Histology Text: There were numerous aggregates of basaloid cells demonstrating an infiltrative pattern. Mart-1 - Positive Histology Text: MART-1 staining demonstrates areas of higher density and clustering of melanocytes with Pagetoid spread upwards within the epidermis. The surgical margins are positive for tumor cells. Mart-1 - Negative Histology Text: MART-1 staining demonstrates a normal density and pattern of melanocytes along the dermal-epidermal junction. The surgical margins are negative for tumor cells. ia Id #: [id number] Information: Selecting Yes will display possible errors in your note based on the variables you have selected. This validation is only offered as a suggestion for you. PLEASE NOTE THAT THE VALIDATION TEXT WILL BE REMOVED WHEN YOU FINALIZE YOUR NOTE. IF YOU WANT TO FAX A PRELIMINARY NOTE YOU WILL NEED TO TOGGLE THIS TO 'NO' IF YOU DO NOT WANT IT IN YOUR FAXED NOTE.

## 2020-07-06 DIAGNOSIS — K59.00 CONSTIPATION, UNSPECIFIED CONSTIPATION TYPE: Primary | ICD-10-CM

## 2020-07-07 NOTE — TELEPHONE ENCOUNTER
Well visit on 3/2020 - was reporting diarrhea.    Please get more information on why she wants to restart this med     SHANT Pan MD ( formerly [facility])

## 2020-07-07 NOTE — TELEPHONE ENCOUNTER
Marta states she is not sure but will talk to her supervisor Hannah and will give us a call back tomorrow. CSS ok to get information from Marta.    Teagan Ceja RN

## 2020-07-07 NOTE — TELEPHONE ENCOUNTER
"Routing refill request to provider for review/approval because:  Medication is reported/historical    Requested Prescriptions   Pending Prescriptions Disp Refills     GAVILAX 17 GM/SCOOP powder [Pharmacy Med Name: GaviLAX Powder] 476 g 11     Sig: MIX 17GM WITH 8OZ OF WATER OR OTHER BEVERAGE AND DRINK BY MOUTH ONCE DAILY       Laxatives Protocol Passed - 7/6/2020  9:35 AM        Passed - Patient is age 6 or older        Passed - Recent (12 mo) or future (30 days) visit within the authorizing provider's specialty     Patient has had an office visit with the authorizing provider or a provider within the authorizing providers department within the previous 12 mos or has a future within next 30 days. See \"Patient Info\" tab in inbasket, or \"Choose Columns\" in Meds & Orders section of the refill encounter.              Passed - Medication is active on med list                   "

## 2020-07-08 NOTE — TELEPHONE ENCOUNTER
Marta states that supervisor told her from [facility] past history and experience, they would like her to continue this medication.      ....Nury Daley

## 2020-07-09 RX ORDER — POLYETHYLENE GLYCOL 3350 17 G/17G
POWDER, FOR SOLUTION ORAL
Qty: 476 G | Refills: 1 | Status: SHIPPED | OUTPATIENT
Start: 2020-07-09 | End: 2023-11-01

## 2020-07-17 ENCOUNTER — TELEPHONE (OUTPATIENT)
Dept: FAMILY MEDICINE | Facility: CLINIC | Age: 25
End: 2020-07-17

## 2020-07-17 DIAGNOSIS — K21.9 GASTROESOPHAGEAL REFLUX DISEASE WITHOUT ESOPHAGITIS: Primary | ICD-10-CM

## 2020-07-17 NOTE — TELEPHONE ENCOUNTER
[facility] is calling.  They now have tablet form of famotidine in and wondering If they can fill the famotidine with the tablets -  Previously it was ordered as suspension.  Thank you..Nury Daley

## 2020-07-20 ENCOUNTER — TELEPHONE (OUTPATIENT)
Dept: FAMILY MEDICINE | Facility: CLINIC | Age: 25
End: 2020-07-20

## 2020-07-20 DIAGNOSIS — K21.9 GASTROESOPHAGEAL REFLUX DISEASE WITHOUT ESOPHAGITIS: ICD-10-CM

## 2020-07-20 RX ORDER — FAMOTIDINE 20 MG/1
20 TABLET, FILM COATED ORAL AT BEDTIME
Qty: 90 TABLET | Refills: 1 | Status: SHIPPED | OUTPATIENT
Start: 2020-07-20 | End: 2021-01-05

## 2020-07-20 RX ORDER — FAMOTIDINE 20 MG/1
TABLET, FILM COATED ORAL
Qty: 90 TABLET | Refills: 1 | Status: SHIPPED | OUTPATIENT
Start: 2020-07-20 | End: 2020-07-20

## 2020-07-20 RX ORDER — FAMOTIDINE 20 MG/1
20 TABLET, FILM COATED ORAL AT BEDTIME
Qty: 90 TABLET | Refills: 1 | Status: SHIPPED | OUTPATIENT
Start: 2020-07-20 | End: 2020-07-20

## 2020-07-20 NOTE — TELEPHONE ENCOUNTER
Reason for Call:  Other prescription    Detailed comments: Pharmacy called and wants to clarify Famotidine directions ,for it is different than before. [phone] option #1    Phone Number Patient can be reached at: Other phone number:  [phone]    Best Time: any    Can we leave a detailed message on this number? YES    Call taken on 7/20/2020 at 9:42 AM by Karen Kaur

## 2020-08-10 ENCOUNTER — OFFICE VISIT (OUTPATIENT)
Dept: FAMILY MEDICINE | Facility: CLINIC | Age: 25
End: 2020-08-10
Payer: MEDICAID

## 2020-08-10 ENCOUNTER — MEDICAL CORRESPONDENCE (OUTPATIENT)
Dept: HEALTH INFORMATION MANAGEMENT | Facility: CLINIC | Age: 25
End: 2020-08-10

## 2020-08-10 VITALS
RESPIRATION RATE: 18 BRPM | WEIGHT: 293 LBS | HEART RATE: 83 BPM | HEIGHT: 63 IN | OXYGEN SATURATION: 97 % | TEMPERATURE: 100.1 F | DIASTOLIC BLOOD PRESSURE: 88 MMHG | SYSTOLIC BLOOD PRESSURE: 133 MMHG | BODY MASS INDEX: 51.91 KG/M2

## 2020-08-10 DIAGNOSIS — F33.1 MODERATE EPISODE OF RECURRENT MAJOR DEPRESSIVE DISORDER (H): ICD-10-CM

## 2020-08-10 DIAGNOSIS — E66.01 MORBID OBESITY (H): ICD-10-CM

## 2020-08-10 DIAGNOSIS — B35.1 ONYCHOMYCOSIS: Primary | ICD-10-CM

## 2020-08-10 LAB
ALBUMIN SERPL-MCNC: 4 G/DL (ref 3.4–5)
ALP SERPL-CCNC: 75 U/L (ref 40–150)
ALT SERPL W P-5'-P-CCNC: 36 U/L (ref 0–50)
AST SERPL W P-5'-P-CCNC: 17 U/L (ref 0–45)
BASOPHILS # BLD AUTO: 0 10E9/L (ref 0–0.2)
BASOPHILS NFR BLD AUTO: 0.2 %
BILIRUB DIRECT SERPL-MCNC: 0.1 MG/DL (ref 0–0.2)
BILIRUB SERPL-MCNC: 0.4 MG/DL (ref 0.2–1.3)
DIFFERENTIAL METHOD BLD: NORMAL
EOSINOPHIL # BLD AUTO: 0.3 10E9/L (ref 0–0.7)
EOSINOPHIL NFR BLD AUTO: 3.5 %
ERYTHROCYTE [DISTWIDTH] IN BLOOD BY AUTOMATED COUNT: 12.6 % (ref 10–15)
FERRITIN SERPL-MCNC: 83 NG/ML (ref 12–150)
HCT VFR BLD AUTO: 42.2 % (ref 35–47)
HGB BLD-MCNC: 13.3 G/DL (ref 11.7–15.7)
LYMPHOCYTES # BLD AUTO: 2.5 10E9/L (ref 0.8–5.3)
LYMPHOCYTES NFR BLD AUTO: 31 %
MCH RBC QN AUTO: 28.6 PG (ref 26.5–33)
MCHC RBC AUTO-ENTMCNC: 31.5 G/DL (ref 31.5–36.5)
MCV RBC AUTO: 91 FL (ref 78–100)
MONOCYTES # BLD AUTO: 0.9 10E9/L (ref 0–1.3)
MONOCYTES NFR BLD AUTO: 11.5 %
NEUTROPHILS # BLD AUTO: 4.3 10E9/L (ref 1.6–8.3)
NEUTROPHILS NFR BLD AUTO: 53.8 %
PLATELET # BLD AUTO: 238 10E9/L (ref 150–450)
PROT SERPL-MCNC: 7.6 G/DL (ref 6.8–8.8)
RBC # BLD AUTO: 4.65 10E12/L (ref 3.8–5.2)
WBC # BLD AUTO: 8 10E9/L (ref 4–11)

## 2020-08-10 PROCEDURE — 82728 ASSAY OF FERRITIN: CPT | Performed by: PHYSICIAN ASSISTANT

## 2020-08-10 PROCEDURE — 36415 COLL VENOUS BLD VENIPUNCTURE: CPT | Performed by: PHYSICIAN ASSISTANT

## 2020-08-10 PROCEDURE — 85025 COMPLETE CBC W/AUTO DIFF WBC: CPT | Performed by: PHYSICIAN ASSISTANT

## 2020-08-10 PROCEDURE — 99214 OFFICE O/P EST MOD 30 MIN: CPT | Performed by: PHYSICIAN ASSISTANT

## 2020-08-10 PROCEDURE — 80076 HEPATIC FUNCTION PANEL: CPT | Performed by: PHYSICIAN ASSISTANT

## 2020-08-10 RX ORDER — TERBINAFINE HYDROCHLORIDE 250 MG/1
250 TABLET ORAL DAILY
Qty: 90 TABLET | Refills: 0 | Status: SHIPPED | OUTPATIENT
Start: 2020-08-10 | End: 2020-11-08

## 2020-08-10 ASSESSMENT — MIFFLIN-ST. JEOR: SCORE: 2281.58

## 2020-08-10 NOTE — LETTER
August 12, 2020      Jane Lee  [address] [place] [id number]        Dear ,    We are writing to inform you of your test results. The blood counts and liver function are normal - recheck in 6 weeks. Iron stores are normal range too.     If you have any questions or concerns, please call the clinic at the number listed above.       Sincerely,      Su Travis PA-C/sc        Resulted Orders   Hepatic panel (Albumin, ALT, AST, Bili, Alk Phos, TP)   Result Value Ref Range    Bilirubin Direct 0.1 0.0 - 0.2 mg/dL    Bilirubin Total 0.4 0.2 - 1.3 mg/dL    Albumin 4.0 3.4 - 5.0 g/dL    Protein Total 7.6 6.8 - 8.8 g/dL    Alkaline Phosphatase 75 40 - 150 U/L    ALT 36 0 - 50 U/L    AST 17 0 - 45 U/L   CBC with platelets and differential   Result Value Ref Range    WBC 8.0 4.0 - 11.0 10e9/L    RBC Count 4.65 3.8 - 5.2 10e12/L    Hemoglobin 13.3 11.7 - 15.7 g/dL    Hematocrit 42.2 35.0 - 47.0 %    MCV 91 78 - 100 fl    MCH 28.6 26.5 - 33.0 pg    MCHC 31.5 31.5 - 36.5 g/dL    RDW 12.6 10.0 - 15.0 %    Platelet Count 238 150 - 450 10e9/L    % Neutrophils 53.8 %    % Lymphocytes 31.0 %    % Monocytes 11.5 %    % Eosinophils 3.5 %    % Basophils 0.2 %    Absolute Neutrophil 4.3 1.6 - 8.3 10e9/L    Absolute Lymphocytes 2.5 0.8 - 5.3 10e9/L    Absolute Monocytes 0.9 0.0 - 1.3 10e9/L    Absolute Eosinophils 0.3 0.0 - 0.7 10e9/L    Absolute Basophils 0.0 0.0 - 0.2 10e9/L    Diff Method Automated Method    Ferritin   Result Value Ref Range    Ferritin 83 12 - 150 ng/mL

## 2020-08-10 NOTE — PATIENT INSTRUCTIONS
We will let you know about labwork  Recheck labwork in 6 weeks    Start medicine for toenail fungus - terbinafine 250 mg daily    See nutritionist  See therapist

## 2020-08-10 NOTE — PROGRESS NOTES
"Subjective     Jane Lee is a 25 year old female who presents to clinic today for the following health issues:    HPI       Chief Complaint   Patient presents with     Fungal Infection     both feet, has been picking at them     Patient Request     Group home states that she is contstantly picking at her skin, bitting nails and would like discuss how to stop making her do that     Patient here with       She has been picking at her facial acne and biting nails when anxious  She denies SI. Doesn't know when next psychiatry appointment is. Wants to see therapist but doesn't think she has a referral.    pcking a lot at toenail fungus. Especially at night, but she doesn't like wearing socks to bed.  Her mom cuts her nails once  A month  Per patient she used to have prescription solution for nails but didn't help    Group home would like to change mrialax to PRN as she does not have issues with constipation    Patient would like iron checked  She also would like to see a nutritionist      Patient Active Problem List   Diagnosis     Morbid obesity (H)     Autism     Moderate episode of recurrent major depressive disorder (H)     Hypothyroidism, unspecified type     YARA (generalized anxiety disorder)     Mild intellectual disability     Attention deficit hyperactivity disorder (ADHD), unspecified ADHD type     Dysthymic disorder     Oppositional defiant disorder     History of seizures     Injury of right optic nerve, initial encounter     Lactose intolerance     Migraine without aura and without status migrainosus, not intractable     Non-seasonal allergic rhinitis due to pollen     Gastroesophageal reflux disease without esophagitis     Constipation, unspecified constipation type     Past Surgical History:   Procedure Laterality Date     BRAIN SURGERY  2000    for seizures. 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\"       Social History     Tobacco Use     Smoking status: Never " "Smoker     Smokeless tobacco: Never Used   Substance Use Topics     Alcohol use: Never     Frequency: Never     Family History   Problem Relation Age of Onset     Diabetes Mother      Coronary Artery Disease Mother      Obesity Mother         Gastric Bypass     Breast Cancer Paternal Aunt 50         BP Readings from Last 3 Encounters:   08/10/20 133/88   03/13/20 134/81   09/18/19 128/77    Wt Readings from Last 3 Encounters:   08/10/20 (!) 156.4 kg (344 lb 14.4 oz)   03/13/20 (!) 153.5 kg (338 lb 6.4 oz)   09/18/19 142.5 kg (314 lb 3.2 oz)                    Reviewed and updated as needed this visit by Provider  Tobacco  Allergies  Meds  Problems  Med Hx  Surg Hx  Fam Hx         Review of Systems   Other than noted above, general, HEENT, respiratory, cardiac, MS, and gastrointestinal systems are negative.       Objective    /88   Pulse 83   Temp 100.1  F (37.8  C) (Tympanic)   Resp 18   Ht 1.589 m (5' 2.56\")   Wt (!) 156.4 kg (344 lb 14.4 oz)   SpO2 97%   BMI 61.96 kg/m    Body mass index is 61.96 kg/m .  Physical Exam   GENERAL: healthy, alert and no distress  RESP: lungs clear to auscultation - no rales, rhonchi or wheezes  CV: regular rate and rhythm, normal S1 S2, no S3 or S4, no murmur, click or rub, no peripheral edema and peripheral pulses strong  MS: no gross musculoskeletal defects noted, no edema  SKIN: POSITIVE   Fungal growth/discoloration/build up under nails of left 2nd toe, right 1st/2nd/3rd/4th toe  PSYCH: Alert and oriented times 3; speech- coherent , normal rate and volume; able to articulate logical thoughts, able to abstract reason, no tangential thoughts, no hallucinations or delusions, affect- flat. Poor eye contact.    Diagnostic Test Results:  Labs reviewed in Epic        Assessment & Plan     ASSESSMENT/PLAN:      ICD-10-CM    1. Onychomycosis  B35.1 Hepatic panel (Albumin, ALT, AST, Bili, Alk Phos, TP)     CBC with platelets and differential     Ferritin     terbinafine " (LAMISIL) 250 MG tablet   2. Morbid obesity (H)  E66.01 NUTRITION REFERRAL   3. Moderate episode of recurrent major depressive disorder (H)  F33.1 MENTAL HEALTH REFERRAL  - Adult; Outpatient Treatment; Individual/Couples/Family/Group Therapy/Health Psychology; [facility] [place]: [facility] [phone]; We will contact you to schedule the appointment or please call with any questions     We discussed risks/benefits of medication, may not fully treat toenail fungus, difficult to treat.    Patient Instructions   We will let you know about labwork  Recheck labwork in 6 weeks    Start medicine for toenail fungus - terbinafine 250 mg daily    See nutritionist  See therapist    Return in about 3 months (around 11/10/2020) for if not improving or if worsening.    Su Travis PA-C  [facility]

## 2020-08-25 ENCOUNTER — TRANSFERRED RECORDS (OUTPATIENT)
Dept: HEALTH INFORMATION MANAGEMENT | Facility: CLINIC | Age: 25
End: 2020-08-25

## 2020-08-27 ENCOUNTER — ALLIED HEALTH/NURSE VISIT (OUTPATIENT)
Dept: FAMILY MEDICINE | Facility: CLINIC | Age: 25
End: 2020-08-27
Payer: MEDICAID

## 2020-08-27 DIAGNOSIS — Z30.42 DEPO-PROVERA CONTRACEPTIVE STATUS: Primary | ICD-10-CM

## 2020-08-27 PROCEDURE — 99207 ZZC NO CHARGE NURSE ONLY: CPT

## 2020-08-27 PROCEDURE — 96372 THER/PROPH/DIAG INJ SC/IM: CPT

## 2020-08-27 RX ADMIN — MEDROXYPROGESTERONE ACETATE 150 MG: 150 INJECTION, SUSPENSION INTRAMUSCULAR at 11:17

## 2020-08-27 NOTE — NURSING NOTE
Clinic Administered Medication Documentation      Depo Provera Documentation    URINE HCG: not indicated    Depo-Provera Standing Order inclusion/exclusion criteria reviewed.   Patient meets: inclusion criteria     BP: Data Unavailable  LAST PAP/EXAM:   Lab Results   Component Value Date    PAP NIL 03/13/2020       Prior to injection, verified patient identity using patient's name and date of birth. Medication was administered. Please see MAR and medication order for additional information.     Was entire vial of medication used? Yes  Vial/Syringe: Single dose vial  Expiration Date:  11/2020     Patient instructed to remain in clinic for 15 minutes.  NEXT INJECTION DUE: 11/12/20 - 11/26/20     The following medication was given:     MEDICATION: Depo Provera 150mg  ROUTE: IM  SITE: LUQ - Gluteus  DOSE: 150 mg/1ML  LOT #: [id number]  :  Eleno   EXPIRATION DATE:  11/2020  NDC#: 15058-403-97     Rigoberto Moses CMA

## 2020-09-28 ENCOUNTER — TRANSFERRED RECORDS (OUTPATIENT)
Dept: HEALTH INFORMATION MANAGEMENT | Facility: CLINIC | Age: 25
End: 2020-09-28

## 2020-09-30 ENCOUNTER — OFFICE VISIT (OUTPATIENT)
Dept: FAMILY MEDICINE | Facility: CLINIC | Age: 25
End: 2020-09-30
Payer: MEDICAID

## 2020-09-30 VITALS
OXYGEN SATURATION: 98 % | HEART RATE: 92 BPM | BODY MASS INDEX: 51.91 KG/M2 | SYSTOLIC BLOOD PRESSURE: 137 MMHG | TEMPERATURE: 99 F | RESPIRATION RATE: 18 BRPM | HEIGHT: 63 IN | WEIGHT: 293 LBS | DIASTOLIC BLOOD PRESSURE: 84 MMHG

## 2020-09-30 DIAGNOSIS — Z78.9 LIVES IN GROUP HOME: ICD-10-CM

## 2020-09-30 DIAGNOSIS — F84.0 AUTISM: ICD-10-CM

## 2020-09-30 DIAGNOSIS — Z30.42 ENCOUNTER FOR SURVEILLANCE OF INJECTABLE CONTRACEPTIVE: ICD-10-CM

## 2020-09-30 DIAGNOSIS — Z23 NEED FOR PROPHYLACTIC VACCINATION AND INOCULATION AGAINST INFLUENZA: ICD-10-CM

## 2020-09-30 DIAGNOSIS — B35.1 ONYCHOMYCOSIS: Primary | ICD-10-CM

## 2020-09-30 LAB
ALBUMIN SERPL-MCNC: 3.7 G/DL (ref 3.4–5)
ALP SERPL-CCNC: 73 U/L (ref 40–150)
ALT SERPL W P-5'-P-CCNC: 43 U/L (ref 0–50)
AST SERPL W P-5'-P-CCNC: 19 U/L (ref 0–45)
BASOPHILS # BLD AUTO: 0 10E9/L (ref 0–0.2)
BASOPHILS NFR BLD AUTO: 0.3 %
BILIRUB DIRECT SERPL-MCNC: 0.1 MG/DL (ref 0–0.2)
BILIRUB SERPL-MCNC: 0.3 MG/DL (ref 0.2–1.3)
DIFFERENTIAL METHOD BLD: ABNORMAL
EOSINOPHIL # BLD AUTO: 0.3 10E9/L (ref 0–0.7)
EOSINOPHIL NFR BLD AUTO: 4.4 %
ERYTHROCYTE [DISTWIDTH] IN BLOOD BY AUTOMATED COUNT: 13.2 % (ref 10–15)
HCT VFR BLD AUTO: 41.1 % (ref 35–47)
HGB BLD-MCNC: 12.9 G/DL (ref 11.7–15.7)
LYMPHOCYTES # BLD AUTO: 1.9 10E9/L (ref 0.8–5.3)
LYMPHOCYTES NFR BLD AUTO: 26.9 %
MCH RBC QN AUTO: 28.7 PG (ref 26.5–33)
MCHC RBC AUTO-ENTMCNC: 31.4 G/DL (ref 31.5–36.5)
MCV RBC AUTO: 92 FL (ref 78–100)
MONOCYTES # BLD AUTO: 0.6 10E9/L (ref 0–1.3)
MONOCYTES NFR BLD AUTO: 8.7 %
NEUTROPHILS # BLD AUTO: 4.2 10E9/L (ref 1.6–8.3)
NEUTROPHILS NFR BLD AUTO: 59.7 %
PLATELET # BLD AUTO: 225 10E9/L (ref 150–450)
PROT SERPL-MCNC: 7.2 G/DL (ref 6.8–8.8)
RBC # BLD AUTO: 4.49 10E12/L (ref 3.8–5.2)
WBC # BLD AUTO: 7.1 10E9/L (ref 4–11)

## 2020-09-30 PROCEDURE — 90686 IIV4 VACC NO PRSV 0.5 ML IM: CPT | Performed by: PHYSICIAN ASSISTANT

## 2020-09-30 PROCEDURE — 87101 SKIN FUNGI CULTURE: CPT | Performed by: PHYSICIAN ASSISTANT

## 2020-09-30 PROCEDURE — 85025 COMPLETE CBC W/AUTO DIFF WBC: CPT | Performed by: PHYSICIAN ASSISTANT

## 2020-09-30 PROCEDURE — 80076 HEPATIC FUNCTION PANEL: CPT | Performed by: PHYSICIAN ASSISTANT

## 2020-09-30 PROCEDURE — 96372 THER/PROPH/DIAG INJ SC/IM: CPT | Performed by: PHYSICIAN ASSISTANT

## 2020-09-30 PROCEDURE — 87107 FUNGI IDENTIFICATION MOLD: CPT | Performed by: PHYSICIAN ASSISTANT

## 2020-09-30 PROCEDURE — 36415 COLL VENOUS BLD VENIPUNCTURE: CPT | Performed by: PHYSICIAN ASSISTANT

## 2020-09-30 PROCEDURE — 99213 OFFICE O/P EST LOW 20 MIN: CPT | Mod: 25 | Performed by: PHYSICIAN ASSISTANT

## 2020-09-30 PROCEDURE — 90471 IMMUNIZATION ADMIN: CPT | Performed by: PHYSICIAN ASSISTANT

## 2020-09-30 RX ORDER — MEDROXYPROGESTERONE ACETATE 150 MG/ML
150 INJECTION, SUSPENSION INTRAMUSCULAR
Status: DISCONTINUED | OUTPATIENT
Start: 2020-09-30 | End: 2021-06-16 | Stop reason: ALTCHOICE

## 2020-09-30 ASSESSMENT — PATIENT HEALTH QUESTIONNAIRE - PHQ9
5. POOR APPETITE OR OVEREATING: NOT AT ALL
SUM OF ALL RESPONSES TO PHQ QUESTIONS 1-9: 1

## 2020-09-30 ASSESSMENT — MIFFLIN-ST. JEOR: SCORE: 2264.79

## 2020-09-30 ASSESSMENT — ANXIETY QUESTIONNAIRES
IF YOU CHECKED OFF ANY PROBLEMS ON THIS QUESTIONNAIRE, HOW DIFFICULT HAVE THESE PROBLEMS MADE IT FOR YOU TO DO YOUR WORK, TAKE CARE OF THINGS AT HOME, OR GET ALONG WITH OTHER PEOPLE: NOT DIFFICULT AT ALL
6. BECOMING EASILY ANNOYED OR IRRITABLE: SEVERAL DAYS
2. NOT BEING ABLE TO STOP OR CONTROL WORRYING: NOT AT ALL
5. BEING SO RESTLESS THAT IT IS HARD TO SIT STILL: NOT AT ALL
3. WORRYING TOO MUCH ABOUT DIFFERENT THINGS: SEVERAL DAYS
GAD7 TOTAL SCORE: 2
7. FEELING AFRAID AS IF SOMETHING AWFUL MIGHT HAPPEN: NOT AT ALL
1. FEELING NERVOUS, ANXIOUS, OR ON EDGE: NOT AT ALL

## 2020-09-30 NOTE — PROGRESS NOTES
"Subjective     Jane Lee is a 25 year old female who presents to clinic today for the following health issues:    HPI       *  Follow up on toenail fungus, states things are slightly better on left foot but not any better on the right foot  *  Recheck labs and will do flu shot  *  Refill depo    Started lamisil 8/10/20 - will take for just over a month left  Still a problem because she picks at her nails  Saw psychiatry yesterday             Review of Systems   Other than noted above, general, HEENT, respiratory, cardiac, MS, and gastrointestinal systems are negative.       Objective    /84   Pulse 92   Temp 99  F (37.2  C) (Tympanic)   Resp 18   Ht 1.589 m (5' 2.56\")   Wt (!) 154.8 kg (341 lb 3.2 oz)   SpO2 98%   BMI 61.30 kg/m    Body mass index is 61.3 kg/m .  Physical Exam   GENERAL: healthy, alert and no distress  RESP: lungs clear to auscultation - no rales, rhonchi or wheezes  CV: regular rate and rhythm, normal S1 S2, no S3 or S4, no murmur, click or rub, no peripheral edema and peripheral pulses strong  MS: no gross musculoskeletal defects noted, no edema  SKIN: POSITIVE Fungal growth/discoloration/build up under nails of left 2nd toe, right 1st/2nd/3rd/4th toe. Some normal growth is seen at base of nail right 1st toe.         Assessment & Plan     ASSESSMENT/PLAN:      ICD-10-CM    1. Onychomycosis  B35.1 Hepatic panel (Albumin, ALT, AST, Bili, Alk Phos, TP)     CBC with platelets and differential     Fungus Culture,  skin, hair, or nail   2. Encounter for surveillance of injectable contraceptive  Z30.42 medroxyPROGESTERone (DEPO-PROVERA) syringe 150 mg     THER/PROPH/DIAG INJ, SC/IM   3. Need for prophylactic vaccination and inoculation against influenza  Z23 INFLUENZA VACCINE IM > 6 MONTHS VALENT IIV4 [18767]     Vaccine Administration, Initial [64372]   4. Lives in group home  Z59.3    5. Autism  F84.0      Will culture the nail fungus.  Consider more aggressive treatment due to patient " picking at nails and concern for spreading fungal infection to finger nails. Consider paint on treatment next step  Continue follow up with psychiatry.    Patient Instructions   Finish course of lamisil  Can follow up if not gone by the time the treatment is over, we can discuss next options  Can do office or virtual visit    Note: did not offer GC/Chlamydia testing due to nationwide shortage of swabs.    Return in about 6 weeks (around 11/11/2020).    Su Travis PA-C  [facility]

## 2020-09-30 NOTE — PATIENT INSTRUCTIONS
Finish course of lamisil  Can follow up if not gone by the time the treatment is over, we can discuss next options  Can do office or virtual visit

## 2020-09-30 NOTE — LETTER
October 12, 2020      [address] [address]        Dear Jane,    We are writing to inform you of your test results. Your labs are normal/stable.   The fungal culture is still processing.       Resulted Orders   Hepatic panel (Albumin, ALT, AST, Bili, Alk Phos, TP)   Result Value Ref Range    Bilirubin Direct 0.1 0.0 - 0.2 mg/dL    Bilirubin Total 0.3 0.2 - 1.3 mg/dL    Albumin 3.7 3.4 - 5.0 g/dL    Protein Total 7.2 6.8 - 8.8 g/dL    Alkaline Phosphatase 73 40 - 150 U/L    ALT 43 0 - 50 U/L    AST 19 0 - 45 U/L   CBC with platelets and differential   Result Value Ref Range    WBC 7.1 4.0 - 11.0 10e9/L    RBC Count 4.49 3.8 - 5.2 10e12/L    Hemoglobin 12.9 11.7 - 15.7 g/dL    Hematocrit 41.1 35.0 - 47.0 %    MCV 92 78 - 100 fl    MCH 28.7 26.5 - 33.0 pg    MCHC 31.4 (L) 31.5 - 36.5 g/dL    RDW 13.2 10.0 - 15.0 %    Platelet Count 225 150 - 450 10e9/L    % Neutrophils 59.7 %    % Lymphocytes 26.9 %    % Monocytes 8.7 %    % Eosinophils 4.4 %    % Basophils 0.3 %    Absolute Neutrophil 4.2 1.6 - 8.3 10e9/L    Absolute Lymphocytes 1.9 0.8 - 5.3 10e9/L    Absolute Monocytes 0.6 0.0 - 1.3 10e9/L    Absolute Eosinophils 0.3 0.0 - 0.7 10e9/L    Absolute Basophils 0.0 0.0 - 0.2 10e9/L    Diff Method Automated Method    Fungus Culture,  skin, hair, or nail   Result Value Ref Range    Specimen Description Toenail     Culture Micro Filamentous fungus isolated (A)        If you have any questions or concerns, please call the clinic at the number listed above.       Sincerely,        Su Travis PA-C

## 2020-10-01 ASSESSMENT — ANXIETY QUESTIONNAIRES: GAD7 TOTAL SCORE: 2

## 2020-10-09 DIAGNOSIS — B35.1 ONYCHOMYCOSIS: ICD-10-CM

## 2020-10-13 RX ORDER — TERBINAFINE HYDROCHLORIDE 250 MG/1
TABLET ORAL
Qty: 30 TABLET | Refills: 2 | OUTPATIENT
Start: 2020-10-13

## 2020-10-22 LAB
BACTERIA SPEC CULT: ABNORMAL
SPECIMEN SOURCE: ABNORMAL

## 2020-10-26 ENCOUNTER — TELEPHONE (OUTPATIENT)
Dept: FAMILY MEDICINE | Facility: CLINIC | Age: 25
End: 2020-10-26

## 2020-10-26 ENCOUNTER — OFFICE VISIT (OUTPATIENT)
Dept: FAMILY MEDICINE | Facility: CLINIC | Age: 25
End: 2020-10-26
Payer: MEDICAID

## 2020-10-26 VITALS
WEIGHT: 293 LBS | SYSTOLIC BLOOD PRESSURE: 126 MMHG | HEIGHT: 63 IN | DIASTOLIC BLOOD PRESSURE: 84 MMHG | TEMPERATURE: 99.4 F | RESPIRATION RATE: 17 BRPM | HEART RATE: 87 BPM | BODY MASS INDEX: 51.91 KG/M2 | OXYGEN SATURATION: 98 %

## 2020-10-26 DIAGNOSIS — N76.0 BV (BACTERIAL VAGINOSIS): ICD-10-CM

## 2020-10-26 DIAGNOSIS — N89.8 VAGINAL ITCHING: ICD-10-CM

## 2020-10-26 DIAGNOSIS — B35.1 ONYCHOMYCOSIS DUE TO DERMATOPHYTE: Primary | ICD-10-CM

## 2020-10-26 DIAGNOSIS — B96.89 BV (BACTERIAL VAGINOSIS): ICD-10-CM

## 2020-10-26 LAB
SPECIMEN SOURCE: ABNORMAL
WET PREP SPEC: ABNORMAL

## 2020-10-26 PROCEDURE — 87210 SMEAR WET MOUNT SALINE/INK: CPT | Performed by: PHYSICIAN ASSISTANT

## 2020-10-26 PROCEDURE — 99213 OFFICE O/P EST LOW 20 MIN: CPT | Performed by: PHYSICIAN ASSISTANT

## 2020-10-26 RX ORDER — LORATADINE 10 MG/1
TABLET ORAL
COMMUNITY
Start: 2020-09-12 | End: 2021-05-24

## 2020-10-26 RX ORDER — METRONIDAZOLE 500 MG/1
500 TABLET ORAL 2 TIMES DAILY
Qty: 14 TABLET | Refills: 0 | Status: SHIPPED | OUTPATIENT
Start: 2020-10-26 | End: 2020-11-02

## 2020-10-26 ASSESSMENT — MIFFLIN-ST. JEOR: SCORE: 2272.96

## 2020-10-26 NOTE — TELEPHONE ENCOUNTER
Prior Authorization Retail Medication Request    Medication/Dose:   ICD code (if different than what is on RX):  Onychomycosis due to dermatophyte [B35.1]  Previously Tried and Failed:    Rationale:      Insurance Name:  [facility]  Covermymeds:  Key: [id number]  Last Name: Jesus  : 1995      Pharmacy Information (if different than what is on RX)  Name:  [facility]  Phone:  [phone]

## 2020-10-26 NOTE — TELEPHONE ENCOUNTER
Reason for Call:  Other appointment today with Su    Detailed comments: Marta from [facility] (?) is requesting to speak with RN regarding Jane's appointment today.  Thank you..Nury Daley    Phone Number Patient can be reached at: Other phone number:  [phone]*    Best Time: before appointment @ 10:40 a.m.    Can we leave a detailed message on this number? YES    Call taken on 10/26/2020 at 9:08 AM by Nury Daley

## 2020-10-26 NOTE — PROGRESS NOTES
"Subjective     Jane Lee is a 25 year old female who presents to clinic today for the following health issues:    HPI         Vaginal Symptoms  Onset/Duration: 1 week  Description:  Vaginal Discharge: white   Itching (Pruritis): YES, very itchy   Burning sensation:  no  Odor: YES  Accompanying Signs & Symptoms:  Urinary symptoms: no  Abdominal pain: no  Fever: no  History:   Sexually active: no  New Partner: no  Possibility of Pregnancy:  no  Recent antibiotic use: no  Previous vaginitis issues: no  Precipitating or alleviating factors: None  Therapies tried and outcome: none    *  Follow up on toenail fungus  *  Scalp itching over the last week    No change in shampoo, conditioner  Doesn't get all of it out of her hair  Sometimes has rash in skin folds she would like checked on    Review of Systems   Other than noted above, general, HEENT, respiratory, cardiac, MS, and gastrointestinal systems are negative.       Objective    /84   Pulse 87   Temp 99.4  F (37.4  C) (Tympanic)   Resp 17   Ht 1.589 m (5' 2.56\")   Wt (!) 155.6 kg (343 lb)   SpO2 98%   BMI 61.62 kg/m    Body mass index is 61.62 kg/m .  Physical Exam   GENERAL: healthy, alert and no distress  NECK: no adenopathy, no asymmetry, masses, or scars and thyroid normal to palpation  RESP: lungs clear to auscultation - no rales, rhonchi or wheezes  CV: regular rate and rhythm, normal S1 S2, no S3 or S4, no murmur, click or rub, no peripheral edema and peripheral pulses strong  ABDOMEN: soft, nontender, no hepatosplenomegaly, no masses and bowel sounds normal  MS: no gross musculoskeletal defects noted, no edema   (female): normal external vaginal exam. Creamy discharge in vaginal vault. Otherwise normal.   SKIN: POSITIVE Fungal growth/discoloration/build up under nails of left 2nd toe, right 1st/2nd/3rd/4th toe. Some normal growth is seen at base of several toes  No rash seen on scalp or intriginous areas        Assessment & Plan "     ASSESSMENT/PLAN:      ICD-10-CM    1. Onychomycosis due to dermatophyte  B35.1 Efinaconazole 10 % SOLN   2. Vaginal itching  N89.8 Wet prep   3. BV (bacterial vaginosis)  N76.0 metroNIDAZOLE (FLAGYL) 500 MG tablet    B96.89      We reviewed toenail fungus culture results.  Yearly forms signed for group home    Patient Instructions   Apply the toenail solution to each affected toenail 8 PM nightly  Finish course of terbinafine    Metronidazole for bacterial vaginosis  Follow up if not improving    Be sure to rinse out the skin folds and dry well    Be sure to rinse out the scalp of shampoo/conditioner really well    Return in about 6 months (around 4/26/2021).    ALEN Ma [facility]

## 2020-10-26 NOTE — PATIENT INSTRUCTIONS
Apply the toenail solution to each affected toenail 8 PM nightly  Finish course of terbinafine    Metronidazole for bacterial vaginosis  Follow up if not improving    Be sure to rinse out the skin folds and dry well    Be sure to rinse out the scalp of shampoo/conditioner really well

## 2020-10-26 NOTE — TELEPHONE ENCOUNTER
Marta reports that [facility] has perineal itching and needs standing orders renewed. Advised to keep appointment today.  Munir Pena RN

## 2020-10-26 NOTE — TELEPHONE ENCOUNTER
Central Prior Authorization Team   Phone: [phone]      PA Initiation    Medication: Jublia  Insurance Company: [place] [employer] ([employer]) - Phone [phone] Fax [phone]  Pharmacy Filling the Rx: [facility] - [place], [place] - [address]  Filling Pharmacy Phone: [phone]  Filling Pharmacy Fax:    Start Date: 10/26/2020

## 2020-10-27 ENCOUNTER — TELEPHONE (OUTPATIENT)
Dept: FAMILY MEDICINE | Facility: CLINIC | Age: 25
End: 2020-10-27

## 2020-10-27 DIAGNOSIS — B35.1 ONYCHOMYCOSIS DUE TO DERMATOPHYTE: Primary | ICD-10-CM

## 2020-10-27 NOTE — TELEPHONE ENCOUNTER
PRIOR AUTHORIZATION DENIED    Medication: Earnest    Denial Date: 10/27/2020    Denial Rational:      Appeal Information:    If you would like to appeal, please supply P/A team with a letter of medical necessity with clinical reason.

## 2020-10-27 NOTE — TELEPHONE ENCOUNTER
Reason for Call:  Other prescription    Detailed comments: pt  gilmar  is calling about another option for the jublia 10% since insurance will not cover. Did notify gilmar the start of  PA    Phone Number Patient can be reached at: Home number on file [phone]    Best Time: any    Can we leave a detailed message on this number? YES    Call taken on 10/27/2020 at 9:07 AM by Sharron Wu

## 2020-10-28 RX ORDER — TAVABOROLE TOPICAL SOLUTION, 5% 43.5 MG/ML
SOLUTION TOPICAL DAILY
Qty: 10 ML | Refills: 3 | Status: SHIPPED | OUTPATIENT
Start: 2020-10-28 | End: 2021-01-29

## 2020-11-03 ENCOUNTER — TRANSFERRED RECORDS (OUTPATIENT)
Dept: HEALTH INFORMATION MANAGEMENT | Facility: CLINIC | Age: 25
End: 2020-11-03

## 2020-11-04 ENCOUNTER — DOCUMENTATION ONLY (OUTPATIENT)
Dept: LAB | Facility: CLINIC | Age: 25
End: 2020-11-04

## 2020-11-04 NOTE — PROGRESS NOTES
Can you call the group home - I'm not sure what labs we are getting.  - we can screen hepatitis C which is recommended once in your lifetime  - was she able to get the topical antifungal? If we are stopping the terbinafine oral antifungal we do not need to recheck the liver function tests.  Su Travis PA-C

## 2020-11-04 NOTE — PROGRESS NOTES
Kassandra from the Group Home is not sure why she has that appointment but also thinks it may have been for the liver functions for the oral antifungal which she is no longer taking due to having the topical now that they use. I will cancel the lab appointment.    Teagan Ceja RN

## 2020-11-05 ENCOUNTER — TELEPHONE (OUTPATIENT)
Dept: FAMILY MEDICINE | Facility: CLINIC | Age: 25
End: 2020-11-05

## 2020-11-05 NOTE — TELEPHONE ENCOUNTER
Reason for Call:  Other prescription    Detailed comments: Kassandra with  the group home is calling on the following medications for Jane:  Terbinafine, Oral  Kerydin, topical  Wondering are they suppose to finish the oral medication of discontinue?      Phone Number Patient can be reached at: Other phone number:  Kassandra is at [phone]    Best Time: any    Can we leave a detailed message on this number? YES    Call taken on 11/5/2020 at 8:08 AM by Jyothi Davidson

## 2020-11-05 NOTE — TELEPHONE ENCOUNTER
Per 10.26.20 office visit notes, patient is to finish medications and return in 6 months for a follow up. Informed Kassandra of instructions. Kassandra thought she was to come in for labs now. There is no mention of labs. She will make an appointment for follow up unless there are other issues that occur sooner.  Alexandria Bolden RN

## 2020-11-18 ENCOUNTER — ALLIED HEALTH/NURSE VISIT (OUTPATIENT)
Dept: FAMILY MEDICINE | Facility: CLINIC | Age: 25
End: 2020-11-18
Payer: MEDICAID

## 2020-11-18 VITALS — DIASTOLIC BLOOD PRESSURE: 82 MMHG | SYSTOLIC BLOOD PRESSURE: 118 MMHG

## 2020-11-18 DIAGNOSIS — Z30.42 ENCOUNTER FOR SURVEILLANCE OF INJECTABLE CONTRACEPTIVE: Primary | ICD-10-CM

## 2020-11-18 PROCEDURE — 96372 THER/PROPH/DIAG INJ SC/IM: CPT

## 2020-11-18 PROCEDURE — 99207 PR NO CHARGE NURSE ONLY: CPT

## 2020-11-18 RX ADMIN — MEDROXYPROGESTERONE ACETATE 150 MG: 150 INJECTION, SUSPENSION INTRAMUSCULAR at 11:12

## 2020-11-18 NOTE — NURSING NOTE
Clinic Administered Medication Documentation      Depo Provera Documentation    URINE HCG: not indicated    Depo-Provera Standing Order inclusion/exclusion criteria reviewed.   Patient meets: inclusion criteria     BP: Data Unavailable  LAST PAP/EXAM:   Lab Results   Component Value Date    PAP NIL 03/13/2020       Prior to injection, verified patient identity using patient's name and date of birth. Medication was administered. Please see MAR and medication order for additional information.     Was entire vial of medication used? Yes  Vial/Syringe: Single dose vial  Expiration Date:  04/2022    Patient instructed to remain in clinic for 15 minutes and report any adverse reaction to staff immediately .  NEXT INJECTION DUE: 2/3/21 - 2/17/21

## 2021-01-04 DIAGNOSIS — K21.9 GASTROESOPHAGEAL REFLUX DISEASE WITHOUT ESOPHAGITIS: ICD-10-CM

## 2021-01-05 RX ORDER — FAMOTIDINE 20 MG/1
TABLET, FILM COATED ORAL
Qty: 90 TABLET | Refills: 0 | Status: SHIPPED | OUTPATIENT
Start: 2021-01-05 | End: 2021-03-19

## 2021-01-05 NOTE — TELEPHONE ENCOUNTER
Prescription approved per [facility] [place].  Genny Canales RN

## 2021-01-11 ENCOUNTER — OFFICE VISIT (OUTPATIENT)
Dept: FAMILY MEDICINE | Facility: CLINIC | Age: 26
End: 2021-01-11
Payer: MEDICAID

## 2021-01-11 VITALS
BODY MASS INDEX: 51.91 KG/M2 | WEIGHT: 293 LBS | RESPIRATION RATE: 18 BRPM | DIASTOLIC BLOOD PRESSURE: 85 MMHG | HEART RATE: 96 BPM | HEIGHT: 63 IN | TEMPERATURE: 99.3 F | OXYGEN SATURATION: 98 % | SYSTOLIC BLOOD PRESSURE: 138 MMHG

## 2021-01-11 DIAGNOSIS — B37.31 YEAST INFECTION OF THE VAGINA: ICD-10-CM

## 2021-01-11 DIAGNOSIS — N76.0 BV (BACTERIAL VAGINOSIS): Primary | ICD-10-CM

## 2021-01-11 DIAGNOSIS — N89.8 VAGINAL DISCHARGE: ICD-10-CM

## 2021-01-11 DIAGNOSIS — F33.1 MODERATE EPISODE OF RECURRENT MAJOR DEPRESSIVE DISORDER (H): ICD-10-CM

## 2021-01-11 DIAGNOSIS — B96.89 BV (BACTERIAL VAGINOSIS): Primary | ICD-10-CM

## 2021-01-11 LAB
SPECIMEN SOURCE: ABNORMAL
WET PREP SPEC: ABNORMAL

## 2021-01-11 PROCEDURE — 99213 OFFICE O/P EST LOW 20 MIN: CPT | Performed by: PHYSICIAN ASSISTANT

## 2021-01-11 PROCEDURE — 87210 SMEAR WET MOUNT SALINE/INK: CPT | Performed by: PHYSICIAN ASSISTANT

## 2021-01-11 RX ORDER — METRONIDAZOLE 500 MG/1
500 TABLET ORAL 2 TIMES DAILY
Qty: 14 TABLET | Refills: 0 | Status: SHIPPED | OUTPATIENT
Start: 2021-01-11 | End: 2021-01-18

## 2021-01-11 RX ORDER — FLUCONAZOLE 150 MG/1
150 TABLET ORAL ONCE
Qty: 1 TABLET | Refills: 0 | Status: SHIPPED | OUTPATIENT
Start: 2021-01-11 | End: 2021-01-11

## 2021-01-11 ASSESSMENT — MIFFLIN-ST. JEOR: SCORE: 2350.98

## 2021-01-11 NOTE — PROGRESS NOTES
"  Assessment & Plan     ASSESSMENT/PLAN:      ICD-10-CM    1. BV (bacterial vaginosis)  N76.0 metroNIDAZOLE (FLAGYL) 500 MG tablet    B96.89    2. Vaginal discharge  N89.8 Wet prep   3. Yeast infection of the vagina  B37.3 fluconazole (DIFLUCAN) 150 MG tablet   4. Moderate episode of recurrent major depressive disorder (H)  F33.1      Symptomatic measures discussed.  Depression: follows with psychiatry    Return in about 1 week (around 1/18/2021) for if not improving or if worsening.   Will follow up this spring for her preventive visit.    ALEN Ma [facility] is a 25 year old who presents to clinic today for the following health issues  accompanied by her group home caregiver:    HPI       Vaginal Symptoms  Onset/Duration: 3 days  Description:  Vaginal Discharge: white   Itching (Pruritis): YES, itching a lot  Burning sensation:  no  Odor: YES, very strong order  Accompanying Signs & Symptoms:  Urinary symptoms: no  Abdominal pain: no  Fever: Temp today 100.1 but she normally runs higher, she feels fine  History:   Sexually active: no  New Partner: no  Possibility of Pregnancy:  no  Recent antibiotic use: no  Previous vaginitis issues: YES - treated with metronidazole 10/26/20  Precipitating or alleviating factors: None  Therapies tried and outcome: none          Review of Systems   Other than noted above, general, HEENT, respiratory, cardiac, MS, and gastrointestinal systems are negative.       Objective    /85   Pulse 96   Temp 99.3  F (37.4  C) (Tympanic)   Resp 18   Ht 1.589 m (5' 2.56\")   Wt (!) 163.4 kg (360 lb 3.2 oz)   SpO2 98%   BMI 64.71 kg/m    Body mass index is 64.71 kg/m .  Physical Exam   GENERAL APPEARANCE: healthy, alert and no distress   (female): normal external genitalia. I performed wet prep, patient declined speculum exam    Results for orders placed or performed in visit on 01/11/21 (from the past 24 hour(s))   Wet prep "    Specimen: Vagina   Result Value Ref Range    Specimen Description Vagina     Wet Prep No Trichomonas seen     Wet Prep Clue cells seen  Moderate   (A)     Wet Prep Yeast seen  Moderate   (A)     Wet Prep WBC'S seen  Moderate

## 2021-01-11 NOTE — PATIENT INSTRUCTIONS

## 2021-01-27 ENCOUNTER — HOSPITAL ENCOUNTER (OUTPATIENT)
Dept: ULTRASOUND IMAGING | Facility: CLINIC | Age: 26
Discharge: HOME | End: 2021-01-27
Attending: PHYSICIAN ASSISTANT | Admitting: PHYSICIAN ASSISTANT
Payer: MEDICAID

## 2021-01-27 ENCOUNTER — TELEPHONE (OUTPATIENT)
Dept: FAMILY MEDICINE | Facility: CLINIC | Age: 26
End: 2021-01-27

## 2021-01-27 ENCOUNTER — OFFICE VISIT (OUTPATIENT)
Dept: FAMILY MEDICINE | Facility: CLINIC | Age: 26
End: 2021-01-27
Payer: MEDICAID

## 2021-01-27 VITALS
HEART RATE: 68 BPM | TEMPERATURE: 99 F | BODY MASS INDEX: 66.25 KG/M2 | WEIGHT: 293 LBS | SYSTOLIC BLOOD PRESSURE: 132 MMHG | DIASTOLIC BLOOD PRESSURE: 74 MMHG | RESPIRATION RATE: 20 BRPM

## 2021-01-27 DIAGNOSIS — B35.1 ONYCHOMYCOSIS DUE TO DERMATOPHYTE: ICD-10-CM

## 2021-01-27 DIAGNOSIS — L03.031 PARONYCHIA OF TOE, RIGHT: ICD-10-CM

## 2021-01-27 DIAGNOSIS — L02.419 CELLULITIS AND ABSCESS OF LEG: ICD-10-CM

## 2021-01-27 DIAGNOSIS — M79.89 LEG SWELLING: Primary | ICD-10-CM

## 2021-01-27 DIAGNOSIS — E66.01 MORBID OBESITY (H): ICD-10-CM

## 2021-01-27 DIAGNOSIS — M79.89 LEG SWELLING: ICD-10-CM

## 2021-01-27 DIAGNOSIS — L03.119 CELLULITIS AND ABSCESS OF LEG: ICD-10-CM

## 2021-01-27 PROCEDURE — 93970 EXTREMITY STUDY: CPT

## 2021-01-27 PROCEDURE — 99214 OFFICE O/P EST MOD 30 MIN: CPT | Performed by: PHYSICIAN ASSISTANT

## 2021-01-27 RX ORDER — CEPHALEXIN 500 MG/1
500 CAPSULE ORAL 3 TIMES DAILY
Qty: 30 CAPSULE | Refills: 0 | Status: SHIPPED | OUTPATIENT
Start: 2021-01-27 | End: 2021-01-27

## 2021-01-27 RX ORDER — CEPHALEXIN 500 MG/1
500 CAPSULE ORAL 3 TIMES DAILY
Qty: 30 CAPSULE | Refills: 0 | Status: SHIPPED | OUTPATIENT
Start: 2021-01-27 | End: 2021-03-19

## 2021-01-27 NOTE — NURSING NOTE
"Chief Complaint   Patient presents with     Toe Pain     Right greater toe      Leg Swelling     Left        Initial /74   Pulse 68   Temp 99  F (37.2  C) (Tympanic)   Resp 20   Wt (!) 167.3 kg (368 lb 12.8 oz)   BMI 66.25 kg/m   Estimated body mass index is 66.25 kg/m  as calculated from the following:    Height as of 1/11/21: 1.589 m (5' 2.56\").    Weight as of this encounter: 167.3 kg (368 lb 12.8 oz).    Patient presents to the clinic using No DME    Health Maintenance that is potentially due pending provider review:  NONE    n/a    Is there anyone who you would like to be able to receive your results? No  If yes have patient fill out JARRETT  Ty Mata M.A.          "

## 2021-01-27 NOTE — PROGRESS NOTES
"  Assessment & Plan     ASSESSMENT/PLAN:      ICD-10-CM    1. Leg swelling  M79.89 US Lower Extremity Venous Duplex Bilateral   2. Morbid obesity (H)  E66.01    3. Onychomycosis due to dermatophyte  B35.1 Efinaconazole 10 % SOLN     DISCONTINUED: Efinaconazole 10 % SOLN   4. Cellulitis and abscess of leg  L03.119 cephALEXin (KEFLEX) 500 MG capsule    L02.419 DISCONTINUED: cephALEXin (KEFLEX) 500 MG capsule   5. Paronychia of toe, right  L03.031 cephALEXin (KEFLEX) 500 MG capsule     DISCONTINUED: cephALEXin (KEFLEX) 500 MG capsule     Negative for DVT. Appearance of cellulitis starting left leg, unknown cause, may be causing swelling reactively.   Recommended close follow up. Red flag signs to be seen urgently were discussed.    We discussed weight management referral - will disucss again on Friday    Patient Instructions   - continue toenail solution for 1 month. Avoid right 1st toe until healed.  - keflex antibiotics x10 days  - ultrasound at [place] NOW  - soak the right foot two times daily   - follow up with me Friday 1/29/21 10:20 AM    - consider weight management referral we discussed       BMI:   Estimated body mass index is 66.25 kg/m  as calculated from the following:    Height as of 1/11/21: 1.589 m (5' 2.56\").    Weight as of this encounter: 167.3 kg (368 lb 12.8 oz).   Weight management plan: Discussed healthy diet and exercise guidelines    Return in about 2 days (around 1/29/2021).    ALEN Ma [facility] is a 25 year old who presents to clinic today for the following health issues  accompanied by her group home caregiver:    HPI       Concern - Right toe pain  Onset: about Friday   Description: Patient has some right greater toe pain, swelling, redness.  Possibly some fungus also.   Intensity: moderate  Progression of Symptoms:  worsening  Accompanying Signs & Symptoms: Scab almost like with a bumo  Previous history of similar problem: yes " "  Precipitating factors:        Worsened by: na   Alleviating factors:        Improved by: na   Therapies tried and outcome: Did a triple antibiotic ointment on toe Friday because was bleeding a little.       Concern - Left leg swelling   Onset: Monday   Description: Left leg was having swelling.  Some pain on the back side    Intensity: mild  Progression of Symptoms:  same  Accompanying Signs & Symptoms: No discoloration, No bruising, no warmth. Smaller red spot on back of calf.    Previous history of similar problem: no  Precipitating factors:        Worsened by: did exercise on Saturday- some walking   Alleviating factors:        Improved by: na   Therapies tried and outcome: Elevated legs on Monday     She wasn't with her this weekend, but they were having her do more exercise this weekend  Has small red spot on calf, not hot or painful  She was doing stretches on the floor, no injury.  No SOB. Feels well. No fevers.    Picking at toe, feels she has ingrown toenail starting/hangnail        * concern: weight gain  Vital Signs 9/30/2020 10/26/2020 11/18/2020 1/11/2021 1/11/2021   Weight (LB) 341 lb 3.2 oz 343 lb  360 lb 3.2 oz      Vital Signs 1/27/2021   Weight (LB) 368 lb 12.8 oz     Mom had gastric bypass. Per patient mom is worried about how patient will do in surgery. Patient has talked to dietician in the past, has been on metformin for weight, does not want restrictions to her diet. She is adament she does not want certain restrictions.   Caregiver says they have discussed in the home about this - have had increased \"behaviors\" when trying to restrict patient's diet, they do not think that nutrition referral would really help due to refusal.      Review of Systems   Other than noted above, general, HEENT, respiratory, cardiac, MS, and gastrointestinal systems are negative.       Objective    /74   Pulse 68   Temp 99  F (37.2  C) (Tympanic)   Resp 20   Wt (!) 167.3 kg (368 lb 12.8 oz)   BMI 66.25 " kg/m    Body mass index is 66.25 kg/m .  Physical Exam   GENERAL: healthy, alert and no distress  RESP: lungs clear to auscultation - no rales, rhonchi or wheezes  CV: regular rate and rhythm, normal S1 S2, no S3 or S4, no murmur, click or rub, no peripheral edema and peripheral pulses strong  ABDOMEN: soft, nontender, no hepatosplenomegaly, no masses and bowel sounds normal  MS: no gross musculoskeletal defects noted.  Difficult exam due to body habitus. Measured lower legs to compare - same widths lower leg until just proximal to ankles - then the left leg was about 1 inch wider. No pitting edema, but both legs appeared swollen to patient and caregiver, L>R.  SKIN: left medial calf shows mildly tender macular erythematous area about 2.5x3 inches. No induration or abscess, no streaking.   Toes: resolving onychomycosis on toenails.   Right 1st toe scabbing medial to nail, tender. Tender lightly erythematous paronychia proximal nail and medial. No streaking. Full ROM. No discharge or abscess.      Us Lower Extremity Venous Duplex Bilateral    Result Date: 1/27/2021  US LOWER EXTREMITY VENOUS DUPLEX BILATERAL 1/27/2021 12:04 PM CLINICAL HISTORY: bilateral leg swelling x2 days, left worse than right. Possible cellulitis left medial calf. R/o clot; Leg swelling TECHNIQUE: Venous Duplex ultrasound of bilateral lower extremities with and without compression, augmentation and duplex. Color flow and spectral Doppler with waveform analysis performed. COMPARISON: None. FINDINGS: Exam includes the common femoral, femoral, popliteal veins as well as segmentally visualized deep calf veins and greater saphenous vein. RIGHT: No deep vein thrombosis. No superficial thrombophlebitis. No popliteal cyst. LEFT: No deep vein thrombosis. No superficial thrombophlebitis. No popliteal cyst.     IMPRESSION: 1.  No deep venous thrombosis in the bilateral lower extremities. MARCK GRIER MD    I was notified 12:10 PM. [employer] will notify  patient. She noted there is some soft tissue swelling left leg at erythematous area

## 2021-01-27 NOTE — PATIENT INSTRUCTIONS
- continue toenail solution for 1 month. Avoid right 1st toe until healed.  - keflex antibiotics x10 days  - ultrasound at [place] NOW  - soak the right foot two times daily   - follow up with me Friday 1/29/21 10:20 AM    - consider weight management referral we discussed

## 2021-01-27 NOTE — TELEPHONE ENCOUNTER
Prior Authorization Retail Medication Request    Medication/Dose: Jublia 10% solution  ICD code (if different than what is on RX):    Previously Tried and Failed:    Rationale:      Insurance Name:  [place] [employer]  Insurance ID:  [id number]      Pharmacy Information (if different than what is on RX)  Name:    Phone:      Thanks,   Rachel Minor  Certified Pharmacy Technician  [facility]  [phone]

## 2021-01-28 NOTE — TELEPHONE ENCOUNTER
Central Prior Authorization Team   Phone: [phone]    PA Initiation    Medication: Jublia 10% solution  Insurance Company: [place] Medicaid ([facility]) - Phone [phone] Fax [phone]  Pharmacy Filling the Rx: [facility] - [place], [place] - [address]  Filling Pharmacy Phone: [phone]  Filling Pharmacy Fax: [phone]  Start Date: 1/28/2021

## 2021-01-29 ENCOUNTER — OFFICE VISIT (OUTPATIENT)
Dept: FAMILY MEDICINE | Facility: CLINIC | Age: 26
End: 2021-01-29
Payer: MEDICAID

## 2021-01-29 DIAGNOSIS — L03.031 PARONYCHIA OF TOE, RIGHT: ICD-10-CM

## 2021-01-29 DIAGNOSIS — B35.1 ONYCHOMYCOSIS DUE TO DERMATOPHYTE: ICD-10-CM

## 2021-01-29 DIAGNOSIS — E66.01 MORBID OBESITY (H): ICD-10-CM

## 2021-01-29 DIAGNOSIS — L02.419 CELLULITIS AND ABSCESS OF LEG: Primary | ICD-10-CM

## 2021-01-29 DIAGNOSIS — L03.119 CELLULITIS AND ABSCESS OF LEG: Primary | ICD-10-CM

## 2021-01-29 PROCEDURE — 99213 OFFICE O/P EST LOW 20 MIN: CPT | Performed by: PHYSICIAN ASSISTANT

## 2021-01-29 RX ORDER — TAVABOROLE TOPICAL SOLUTION, 5% 43.5 MG/ML
SOLUTION TOPICAL DAILY
Qty: 10 ML | Refills: 1 | Status: SHIPPED | OUTPATIENT
Start: 2021-01-29 | End: 2021-03-19 | Stop reason: ALTCHOICE

## 2021-01-29 RX ORDER — TAVABOROLE TOPICAL SOLUTION, 5% 43.5 MG/ML
SOLUTION TOPICAL DAILY
Qty: 10 ML | Refills: 3 | Status: CANCELLED | OUTPATIENT
Start: 2021-01-29

## 2021-01-29 ASSESSMENT — MIFFLIN-ST. JEOR: SCORE: 2356.87

## 2021-01-29 NOTE — TELEPHONE ENCOUNTER
PRIOR AUTHORIZATION DENIED    Medication: Jublia 10% solution-DENIED    Denial Date: 1/29/2021    Denial Rational: PATIENT DID NOT MEET CRITERIA -         Appeal Information:   IF PATIENT IS UNABLE TO TRY/FAIL ALTERNATIVE(S) PLEASE SUPPLY PA TEAM WITH A LETTER OF MEDICAL NECESSITY WITH CLINICAL REASON.

## 2021-01-29 NOTE — PATIENT INSTRUCTIONS
Finish keflex antibiotics  Continue to soak toe twice daily until healed  Try tavaborole solution on toenails x1 month if covered. Otherwise we will wait for prior auth from insurance for other solution    From CDC guidelines:  There is no evidence that acute illness reduces vaccine efficacy or increases vaccine adverse events. However, as a precaution with  moderate or severe acute illness, all vaccines should be delayed until the illness has improved. Mild illnesses (e.g., upper respiratory  infections, diarrhea) are NOT contraindications to vaccination. Do not withhold vaccination if a person is taking antibiotics.

## 2021-01-29 NOTE — PROGRESS NOTES
"  Assessment & Plan     ASSESSMENT/PLAN:      ICD-10-CM    1. Cellulitis and abscess of leg  L03.119     L02.419    2. Onychomycosis due to dermatophyte  B35.1 Tavaborole (KERYDIN) 5 % topical solution   3. Morbid obesity (H)  E66.01 COMPREHENSIVE WEIGHT MANAGEMENT   4. Paronychia of toe, right  L03.031      Will continue antibiotics, patient's symptoms are improving, she feels well. Monitor closely. Red flag signs to be seen urgently were discussed.    Patient Instructions   Finish keflex antibiotics  Continue to soak toe twice daily until healed  Try tavaborole solution on toenails x1 month if covered. Otherwise we will wait for prior auth from insurance for other solution    From [facility] guidelines:  There is no evidence that acute illness reduces vaccine efficacy or increases vaccine adverse events. However, as a precaution with  moderate or severe acute illness, all vaccines should be delayed until the illness has improved. Mild illnesses (e.g., upper respiratory  infections, diarrhea) are NOT contraindications to vaccination. Do not withhold vaccination if a person is taking antibiotics.     Return in about 6 weeks (around 3/15/2021) for after 3/15/21 for Physical Exam.    Su Travis PA-C  [facility] is a 25 year old who presents to clinic today for the following health issues  accompanied by her caregiver from group home:    HPI       *  Follow up from 1/27/2021, states that swelling is better  *  Would like to talk about starting weight loss medication  *  She will be getting the covid vaccine and would like to discuss if that is ok to have while she is on antibiotics and anti fungal.      Review of Systems   Other than noted above, general, HEENT, respiratory, cardiac, MS, and gastrointestinal systems are negative.       Objective    /88   Pulse 95   Temp 100.1  F (37.8  C) (Tympanic)   Resp 18   Ht 1.589 m (5' 2.56\")   Wt (!) 164 kg (361 lb 8 oz)   " SpO2 99%   BMI 64.94 kg/m    Body mass index is 64.94 kg/m .  Physical Exam   GENERAL: healthy, alert and no distress  RESP: lungs clear to auscultation - no rales, rhonchi or wheezes  CV: regular rate and rhythm, normal S1 S2, no S3 or S4, no murmur, click or rub, no peripheral edema and peripheral pulses strong  MS: no gross musculoskeletal defects noted, no edema  SKIN: POSITIVE left calf shows fainter erythema, no warmth, mild induration. Smaller area than prior, no streaking or spreading erythema.  POSITIVE right great toe shows crusting discharge medially, mildly tender medial and proximal nail paronychia. Fainter redness, no streaking or spreading erythema.

## 2021-01-31 ENCOUNTER — TELEPHONE (OUTPATIENT)
Dept: FAMILY MEDICINE | Facility: CLINIC | Age: 26
End: 2021-01-31

## 2021-01-31 VITALS
BODY MASS INDEX: 51.91 KG/M2 | HEIGHT: 63 IN | RESPIRATION RATE: 18 BRPM | SYSTOLIC BLOOD PRESSURE: 138 MMHG | DIASTOLIC BLOOD PRESSURE: 88 MMHG | WEIGHT: 293 LBS | OXYGEN SATURATION: 99 % | HEART RATE: 95 BPM | TEMPERATURE: 100.1 F

## 2021-02-01 ENCOUNTER — AMBULATORY - HEALTHEAST (OUTPATIENT)
Dept: SURGERY | Facility: CLINIC | Age: 26
End: 2021-02-01

## 2021-02-01 DIAGNOSIS — E66.01 MORBID OBESITY (H): ICD-10-CM

## 2021-02-01 NOTE — TELEPHONE ENCOUNTER
Call placed to patient's group home and spoke with Kassandra, patient's caretaker at the group home.     Kassandra reports that patient's cellulitis has improved significantly and she cannot see any redness or swelling to her leg and toe.   Denies fevers.  States patient is feeling well.    Message forwarded to VISHNU Travis for review.     Jose Miguel Hurtado RN

## 2021-02-01 NOTE — TELEPHONE ENCOUNTER
Please call group home - how is Jane's leg and toe (cellulitis)? Any fevers? Feeling well?  Katelyn Travis PA-C

## 2021-02-05 ENCOUNTER — ALLIED HEALTH/NURSE VISIT (OUTPATIENT)
Dept: FAMILY MEDICINE | Facility: CLINIC | Age: 26
End: 2021-02-05
Payer: MEDICAID

## 2021-02-05 DIAGNOSIS — Z30.013 ENCOUNTER FOR INITIAL PRESCRIPTION OF INJECTABLE CONTRACEPTIVE: Primary | ICD-10-CM

## 2021-02-05 PROCEDURE — 99207 PR NO CHARGE NURSE ONLY: CPT

## 2021-02-05 PROCEDURE — 96372 THER/PROPH/DIAG INJ SC/IM: CPT

## 2021-02-05 RX ADMIN — MEDROXYPROGESTERONE ACETATE 150 MG: 150 INJECTION, SUSPENSION INTRAMUSCULAR at 12:01

## 2021-02-08 ENCOUNTER — TELEPHONE (OUTPATIENT)
Dept: FAMILY MEDICINE | Facility: CLINIC | Age: 26
End: 2021-02-08

## 2021-02-12 NOTE — TELEPHONE ENCOUNTER
Called [facility], and was given the 's (Hannah Shaikh) number [phone]. Hannah reports pt's guardian is Varghese Patrice; asked her to fax the guardianship paperwork to the [facility] at (f) [phone]; she will not be back in the office until Monday 2/15/21.    Hannah says [facility] for [facility].    Demetra CORRAL RN, BSN

## 2021-03-08 ENCOUNTER — OFFICE VISIT (OUTPATIENT)
Dept: FAMILY MEDICINE | Facility: CLINIC | Age: 26
End: 2021-03-08
Payer: MEDICAID

## 2021-03-08 VITALS
DIASTOLIC BLOOD PRESSURE: 78 MMHG | SYSTOLIC BLOOD PRESSURE: 126 MMHG | TEMPERATURE: 98.9 F | HEIGHT: 63 IN | HEART RATE: 80 BPM | WEIGHT: 293 LBS | BODY MASS INDEX: 51.91 KG/M2

## 2021-03-08 DIAGNOSIS — L03.031 CELLULITIS OF TOE OF RIGHT FOOT: Primary | ICD-10-CM

## 2021-03-08 DIAGNOSIS — L60.0 INGROWN TOENAIL OF RIGHT FOOT: ICD-10-CM

## 2021-03-08 PROCEDURE — 99213 OFFICE O/P EST LOW 20 MIN: CPT | Performed by: PHYSICIAN ASSISTANT

## 2021-03-08 RX ORDER — SULFAMETHOXAZOLE/TRIMETHOPRIM 800-160 MG
1 TABLET ORAL 2 TIMES DAILY
Qty: 14 TABLET | Refills: 0 | Status: SHIPPED | OUTPATIENT
Start: 2021-03-08 | End: 2021-03-15

## 2021-03-08 ASSESSMENT — PAIN SCALES - GENERAL: PAINLEVEL: MODERATE PAIN (4)

## 2021-03-08 ASSESSMENT — MIFFLIN-ST. JEOR: SCORE: 2350.09

## 2021-03-08 NOTE — PROGRESS NOTES
"    Assessment & Plan       ICD-10-CM    1. Cellulitis of toe of right foot  L03.031 sulfamethoxazole-trimethoprim (BACTRIM DS) 800-160 MG tablet     Orthopedic & Spine  Referral   2. Ingrown toenail of right foot  L60.0 Orthopedic & Spine  Referral     Ongoing issue - per caregiver, no change after oral keflex and topical anti fungal  Caregiver notes she has had patient soak toe when she is there but patient states she has not been doing that recently  Toe very angry looking and unlikely that foot cares/conservative management alone is going to be enough at this point  Referral to podiatry for toenail removal   Given lack of response on keflex and that there is some mild cellulitis of entire great toe and drainage from toe, will treat with bactrim        Return in about 4 weeks (around 4/5/2021) for If not improving or worsening.    ALEN [facility]rd [facility] is a 25 year old who presents for the following health issues     HPI         Patient is concerned with an ingrown toenail on 2 of her toes on her right foot. They seem to be infected and she has had this issue before.    Has been an ongoing issue  WAs seen/treated in Jan - put on oral keflex and a topical antifungal  Also advised to soak which patient and caregiver report that were doing - per patient not soaking anymore  Caregiver who has been with her states there really wasn't any improvement with treatment      Review of Systems   Remainder of ROS obtained and found to be negative other than that which was documented above        Objective    /78   Pulse 80   Temp 98.9  F (37.2  C) (Tympanic)   Ht 1.589 m (5' 2.56\")   Wt (!) 163.3 kg (360 lb)   BMI 64.67 kg/m    Body mass index is 64.67 kg/m .  Physical Exam   GENERAL: alert and no distress  TOE, right great  Entire toe is red, swollen, very tender to touch  Over the medial edge the nail is deeply embedded and there is a ?scab " or something at the tip of the toe, possible opened up previously to drainage per caregiver

## 2021-03-17 ENCOUNTER — OFFICE VISIT (OUTPATIENT)
Dept: PODIATRY | Facility: CLINIC | Age: 26
End: 2021-03-17
Payer: MEDICAID

## 2021-03-17 VITALS
HEIGHT: 63 IN | DIASTOLIC BLOOD PRESSURE: 101 MMHG | BODY MASS INDEX: 51.91 KG/M2 | HEART RATE: 76 BPM | SYSTOLIC BLOOD PRESSURE: 148 MMHG | WEIGHT: 293 LBS

## 2021-03-17 DIAGNOSIS — L60.0 INGROWN NAIL OF SECOND TOE OF RIGHT FOOT: ICD-10-CM

## 2021-03-17 DIAGNOSIS — L60.0 INGROWN NAIL OF GREAT TOE OF RIGHT FOOT: Primary | ICD-10-CM

## 2021-03-17 PROCEDURE — 99203 OFFICE O/P NEW LOW 30 MIN: CPT | Mod: 25 | Performed by: PODIATRIST

## 2021-03-17 PROCEDURE — 11730 AVULSION NAIL PLATE SIMPLE 1: CPT | Mod: T5 | Performed by: PODIATRIST

## 2021-03-17 PROCEDURE — 11732 AVLSN NAIL PLATE SIMPLE EACH: CPT | Mod: T6 | Performed by: PODIATRIST

## 2021-03-17 RX ORDER — CEPHALEXIN 500 MG/1
500 CAPSULE ORAL 4 TIMES DAILY
Qty: 40 CAPSULE | Refills: 0 | Status: SHIPPED | OUTPATIENT
Start: 2021-03-17 | End: 2021-04-28

## 2021-03-17 ASSESSMENT — MIFFLIN-ST. JEOR: SCORE: 2350.09

## 2021-03-17 NOTE — PROGRESS NOTES
Assessment & Plan     ASSESSMENT/PLAN:      ICD-10-CM    1. OCP (oral contraceptive pills) initiation  Z30.011 levonorgestrel-ethinyl estradiol (SEASONALE) 0.15-0.03 MG tablet   2. Need for hepatitis C screening test  Z11.59 Hepatitis C Screen Reflex to HCV RNA Quant and Genotype     T4 free   3. Hypothyroidism, unspecified type  E03.9 TSH WITH FREE T4 REFLEX     TSH with free T4 reflex     levothyroxine (SYNTHROID/LEVOTHROID) 75 MCG tablet   4. Gastroesophageal reflux disease without esophagitis  K21.9 famotidine (PEPCID) 20 MG tablet   5. Moderate episode of recurrent major depressive disorder (H)  F33.1 Lithium level     Oxcarbazepine level     CBC with platelets and differential     Comprehensive metabolic panel (BMP + Alb, Alk Phos, ALT, AST, Total. Bili, TP)     Will check labs today, including medication levels.  Continue follow up with psychiatry  Continue follow up with weight management  Will switch from depo to OCP on advice from weight  due to weight gain. Patient does NOT want IUD or arm implant or nuvaring.    Discussed contraceptive options including pill, patch, ring, arm implant, and Depo Provera. In addition IUD'S including Mirena and Paragard were discussed. The risks, side effects and effectiveness of each method was discussed and patient understands. Discussed back up method of contraception.    These side effects and risks include but not limited to: Headaches, nausea, breast tenderness, irregular or increased bleeding or spotting, weight loss or gain. In addition discussed the potential for possible increased risk for blood clots, either Deep Vein thrombosis (DVT) or Pulmonary Emboli (PE) with hormonal contraception.    Reviewed the common symptoms of DVT unilateral (typically) or bilateral leg/ calf pain, swelling and or tenderness of the affected leg, localized erythema or warmth. PE symptoms include chest pain or tightness, shortness of breath, or cough     Patient  "agrees to accept the risk and side effects of contraception and has chosen combination pills as her method of contraception.      No follow-ups on file.    ALEN Ma [facility] is a 25 year old who presents for the following health issues  accompanied by her caregiver/staff:    HPI     Chief Complaint   Patient presents with     Recheck Medication     Refills and needing standing orders done as well     *  Nutritionist wants her to stop depo shot and go on oral birth control with no placebo     Med check 3/24 with Domeniac    Hypertension: no  Smoking/tobacco use: no  History of cancer: no  History of heart problems or blood clots: no  History of migraines with aura: no aura  Pregnancy testing: not sexually active  STD testing: declined      Review of Systems   Other than noted above, general, HEENT, respiratory, cardiac, MS, and gastrointestinal systems are negative.       Objective    /84   Pulse 97   Temp 99.6  F (37.6  C) (Tympanic)   Resp 18   Ht 1.589 m (5' 2.56\")   Wt (!) 160.8 kg (354 lb 9.6 oz)   SpO2 99%   BMI 63.70 kg/m    Body mass index is 63.7 kg/m .  Physical Exam   GENERAL: healthy, alert and no distress  RESP: lungs clear to auscultation - no rales, rhonchi or wheezes  CV: regular rate and rhythm, normal S1 S2, no S3 or S4, no murmur, click or rub, no peripheral edema and peripheral pulses strong  ABDOMEN: soft, nontender, no hepatosplenomegaly, no masses and bowel sounds normal  MS: no gross musculoskeletal defects noted, no edema  PSYCH: mentation appears normal, affect normal/bright        "

## 2021-03-17 NOTE — NURSING NOTE
"Chief Complaint   Patient presents with     Ingrown Toenail     right third foot, great toenail right       Initial BP (!) 154/94   Pulse 76   Ht 1.589 m (5' 2.56\")   Wt (!) 163.3 kg (360 lb)   BMI 64.67 kg/m   Estimated body mass index is 64.67 kg/m  as calculated from the following:    Height as of this encounter: 1.589 m (5' 2.56\").    Weight as of this encounter: 163.3 kg (360 lb).  Medications and allergies reviewed.      Claudia CHAIDEZ MA    "

## 2021-03-17 NOTE — LETTER
"    3/17/2021         RE: Jane Lee  [address] [facility] [address]        Dear Colleague,    Thank you for referring your patient, Jane Lee, to the [facility] [place]. Please see a copy of my visit note below.    Jane Lee is a 25 year old female who presents with her caregiver with a chief complain of a painful ingrown toenail to the right great toe and second toe.  The patient relates pain when wearing shoes.  The patient denies any redness extending up the great toe and second toe into the foot.  The patient relates the condition has been getting worse over the past several weeks.         Pertinent medical, surgical and family history include, gastroesophageal reflux disease, hypothyroidism, eczema    Vitals: BP (!) 154/94   Pulse 76   Ht 1.589 m (5' 2.56\")   Wt (!) 163.3 kg (360 lb)   BMI 64.67 kg/m    BMI= Body mass index is 64.67 kg/m .    LOWER EXTREMITY PHYSICAL EXAM    Dermatologic: One notes an inflamed nail border of the right great toe and second toe  There is noted erythema and edema located around the labial fold and does not extend past the interphalangeal joint.  There is no apparent purulent drainage noted.  There is pain on palpation to the proximal aspect of the nail border.  Otherwise, the skin is intact to both lower extremities without significant lesions, rash or abrasion.           Vascular: DP & PT pulses are intact & regular on the right.   CFT and skin temperature is normal to the right lower extremities.     Neurologic: Lower extremity sensation is intact to light touch.  No evidence of weakness in the right lower extremities.        Musculoskeletal: Patient is ambulatory without assistive device or brace.  No gross ankle deformity noted.  No foot or ankle joint effusion is noted.         ASSESSMENT / PLAN:     ICD-10-CM    1. Ingrown nail of great toe of right foot  L60.0 REMOVAL OF NAIL PLATE SIMPLE SINGLE   2. Ingrown nail of " second toe of right foot  L60.0 REMOVAL OF NAIL PLATE SIMPLE SINGLE       Plan:  I have explained to Jane and her caregiver about the condition.  The potential causes and nature of an ingrown toenail were discussed with the patient.  We reviewed the natural history and prognosis of the condition and potential risks if no treatment is provided.  Treatment options discussed included conservative management (oral antibiotics, soaking of foot, adequate width shoes)  as well as surgical management (partial or total nail removal).  The pros and cons of both forms as well as risks and benefits of treatment were reviewed.       At this point, I recommended having the offending nail border  removed.  I have explained to the patient all of the possible risks, benefits, alternatives to the procedure.  The patient consented to the proposed procedure.  The right great toe and second toe was swabbed with alcohol.  Next, approximately 5 cc of 1% lidocaine plain was injected around the right great toe and second toe.  The right great toe and second toewas then prepped with Betadine ointment.  A tourniquet was applied to the right great toe and second toe.  The offending nail border was split using an English anvil back to the matrix.  Next, utilizing a straight hemostat the offending nail border was avulsed with the attached matrix.  The wound bed was debrided on any remaining nail, matrix and skin.   The wound was then irrigated and dressed with Triple antibiotic ointment and a compressive dry sterile dressing.  The tourniquet was removed and a prompt hyperemic response was noted.  The patient tolerated the procedure well with no complications.  The patient was given post procedure instructions for the care of the wound.  The patient was informed that it is common to experience redness with watery drainage coming from the treated areas of the toe related to the phenol application.  The patient was instructed to notify the office  if any redness extending past the big toe joint or fever with chills are experienced.  The patient was given a 10-day course of oral Keflex 500 mg p.o. 4 times daily.    There is low risk of morbidity with the procedure.  There was no overlap in work associated with the evaluation/management and the work associated with the procedure.    Jane verbalized agreement with and understanding of the rational for the diagnosis and treatment plan.  All questions were answered to best of my ability and the patient's satisfaction. The patient was advised to contact the clinic with any questions that may arise after the clinic visit.      Disclaimer: This note consists of symbols derived from keyboarding, dictation and/or voice recognition software. As a result, there may be errors in the script that have gone undetected. Please consider this when interpreting information found in this chart.      DM Ivy.P.FAIZAN., F.A.C.F.A.S.        Again, thank you for allowing me to participate in the care of your patient.        Sincerely,        Palmer Brice DPM

## 2021-03-17 NOTE — PROGRESS NOTES
"Jane Lee is a 25 year old female who presents with her caregiver with a chief complain of a painful ingrown toenail to the right great toe and second toe.  The patient relates pain when wearing shoes.  The patient denies any redness extending up the great toe and second toe into the foot.  The patient relates the condition has been getting worse over the past several weeks.         Pertinent medical, surgical and family history include, gastroesophageal reflux disease, hypothyroidism, eczema    Vitals: BP (!) 154/94   Pulse 76   Ht 1.589 m (5' 2.56\")   Wt (!) 163.3 kg (360 lb)   BMI 64.67 kg/m    BMI= Body mass index is 64.67 kg/m .    LOWER EXTREMITY PHYSICAL EXAM    Dermatologic: One notes an inflamed nail border of the right great toe and second toe  There is noted erythema and edema located around the labial fold and does not extend past the interphalangeal joint.  There is no apparent purulent drainage noted.  There is pain on palpation to the proximal aspect of the nail border.  Otherwise, the skin is intact to both lower extremities without significant lesions, rash or abrasion.           Vascular: DP & PT pulses are intact & regular on the right.   CFT and skin temperature is normal to the right lower extremities.     Neurologic: Lower extremity sensation is intact to light touch.  No evidence of weakness in the right lower extremities.        Musculoskeletal: Patient is ambulatory without assistive device or brace.  No gross ankle deformity noted.  No foot or ankle joint effusion is noted.         ASSESSMENT / PLAN:     ICD-10-CM    1. Ingrown nail of great toe of right foot  L60.0 REMOVAL OF NAIL PLATE SIMPLE SINGLE   2. Ingrown nail of second toe of right foot  L60.0 REMOVAL OF NAIL PLATE SIMPLE SINGLE       Plan:  I have explained to Jane and her caregiver about the condition.  The potential causes and nature of an ingrown toenail were discussed with the patient.  We reviewed the natural " history and prognosis of the condition and potential risks if no treatment is provided.  Treatment options discussed included conservative management (oral antibiotics, soaking of foot, adequate width shoes)  as well as surgical management (partial or total nail removal).  The pros and cons of both forms as well as risks and benefits of treatment were reviewed.       At this point, I recommended having the offending nail border  removed.  I have explained to the patient all of the possible risks, benefits, alternatives to the procedure.  The patient consented to the proposed procedure.  The right great toe and second toe was swabbed with alcohol.  Next, approximately 5 cc of 1% lidocaine plain was injected around the right great toe and second toe.  The right great toe and second toewas then prepped with Betadine ointment.  A tourniquet was applied to the right great toe and second toe.  The offending nail border was split using an English anvil back to the matrix.  Next, utilizing a straight hemostat the offending nail border was avulsed with the attached matrix.  The wound bed was debrided on any remaining nail, matrix and skin.   The wound was then irrigated and dressed with Triple antibiotic ointment and a compressive dry sterile dressing.  The tourniquet was removed and a prompt hyperemic response was noted.  The patient tolerated the procedure well with no complications.  The patient was given post procedure instructions for the care of the wound.  The patient was informed that it is common to experience redness with watery drainage coming from the treated areas of the toe related to the phenol application.  The patient was instructed to notify the office if any redness extending past the big toe joint or fever with chills are experienced.  The patient was given a 10-day course of oral Keflex 500 mg p.o. 4 times daily.    There is low risk of morbidity with the procedure.  There was no overlap in work  associated with the evaluation/management and the work associated with the procedure.    Jane verbalized agreement with and understanding of the rational for the diagnosis and treatment plan.  All questions were answered to best of my ability and the patient's satisfaction. The patient was advised to contact the clinic with any questions that may arise after the clinic visit.      Disclaimer: This note consists of symbols derived from keyboarding, dictation and/or voice recognition software. As a result, there may be errors in the script that have gone undetected. Please consider this when interpreting information found in this chart.      OLIVER Brice D.P.M., [email or web address].

## 2021-03-17 NOTE — PATIENT INSTRUCTIONS
Post toenail procedure instructions:    1. Keep bandages on for the rest of the day; take off this evening.  2. Soak the foot and toe in warm water with Epsom's salt or Dreft detergent for 20 min.  3. Clean the toe and the treated area with a soapy wash cloth.  4. Apply a topical antibiotic (Bacitracin, Neosporin or triple antibiotic) to the treated area and cover with a Band-Aid  5. Repeat this morning and night for two weeks, or until the treated are resolves any redness or drainage.  a. Redness and drainage is normal when treated with the Phenol acid.  b. Notify the office if there is any redness extending up the foot or purulent drainage noted.    Any discomfort should be treated with either Ibuprofen (Advil) or Tylenol.  Please follow package instructions on amount to be taken.  Jane to follow up with Primary Care provider regarding elevated blood pressure.

## 2021-03-18 ENCOUNTER — COMMUNICATION - HEALTHEAST (OUTPATIENT)
Dept: TELEHEALTH | Facility: CLINIC | Age: 26
End: 2021-03-18

## 2021-03-18 ENCOUNTER — OFFICE VISIT - HEALTHEAST (OUTPATIENT)
Dept: SURGERY | Facility: CLINIC | Age: 26
End: 2021-03-18

## 2021-03-18 DIAGNOSIS — H53.453 PERIPHERAL VISION LOSS, BILATERAL: ICD-10-CM

## 2021-03-18 DIAGNOSIS — R06.2 WHEEZING: ICD-10-CM

## 2021-03-18 DIAGNOSIS — Z74.09 IMPAIRED PHYSICAL MOBILITY: ICD-10-CM

## 2021-03-18 DIAGNOSIS — E73.9 LACTOSE INTOLERANCE: ICD-10-CM

## 2021-03-18 DIAGNOSIS — L68.0 HIRSUTISM: ICD-10-CM

## 2021-03-18 DIAGNOSIS — R06.83 LOUD SNORING: ICD-10-CM

## 2021-03-18 DIAGNOSIS — G43.909 MIGRAINE WITHOUT STATUS MIGRAINOSUS, NOT INTRACTABLE, UNSPECIFIED MIGRAINE TYPE: ICD-10-CM

## 2021-03-18 DIAGNOSIS — E03.9 HYPOTHYROIDISM, UNSPECIFIED TYPE: ICD-10-CM

## 2021-03-18 DIAGNOSIS — F32.A ANXIETY AND DEPRESSION: ICD-10-CM

## 2021-03-18 DIAGNOSIS — R56.9 SEIZURES (H): ICD-10-CM

## 2021-03-18 DIAGNOSIS — F41.9 ANXIETY AND DEPRESSION: ICD-10-CM

## 2021-03-18 DIAGNOSIS — F84.0 AUTISM SPECTRUM DISORDER: ICD-10-CM

## 2021-03-18 DIAGNOSIS — E66.01 MORBID OBESITY WITH BMI OF 60.0-69.9, ADULT (H): ICD-10-CM

## 2021-03-18 DIAGNOSIS — E78.5 DYSLIPIDEMIA: ICD-10-CM

## 2021-03-18 DIAGNOSIS — R40.0 DAYTIME SOMNOLENCE: ICD-10-CM

## 2021-03-18 DIAGNOSIS — F91.3 OPPOSITIONAL DEFIANT DISORDER: ICD-10-CM

## 2021-03-18 DIAGNOSIS — L30.9 ECZEMA, UNSPECIFIED TYPE: ICD-10-CM

## 2021-03-18 DIAGNOSIS — K21.9 GASTROESOPHAGEAL REFLUX DISEASE WITHOUT ESOPHAGITIS: ICD-10-CM

## 2021-03-18 DIAGNOSIS — E28.2 PCOS (POLYCYSTIC OVARIAN SYNDROME): ICD-10-CM

## 2021-03-18 ASSESSMENT — MIFFLIN-ST. JEOR: SCORE: 2320.32

## 2021-03-19 ENCOUNTER — OFFICE VISIT (OUTPATIENT)
Dept: FAMILY MEDICINE | Facility: CLINIC | Age: 26
End: 2021-03-19
Payer: MEDICAID

## 2021-03-19 VITALS
BODY MASS INDEX: 51.91 KG/M2 | HEART RATE: 97 BPM | DIASTOLIC BLOOD PRESSURE: 84 MMHG | TEMPERATURE: 99.6 F | RESPIRATION RATE: 18 BRPM | SYSTOLIC BLOOD PRESSURE: 134 MMHG | WEIGHT: 293 LBS | OXYGEN SATURATION: 99 % | HEIGHT: 63 IN

## 2021-03-19 DIAGNOSIS — E03.9 HYPOTHYROIDISM, UNSPECIFIED TYPE: ICD-10-CM

## 2021-03-19 DIAGNOSIS — Z11.59 NEED FOR HEPATITIS C SCREENING TEST: ICD-10-CM

## 2021-03-19 DIAGNOSIS — F33.1 MODERATE EPISODE OF RECURRENT MAJOR DEPRESSIVE DISORDER (H): ICD-10-CM

## 2021-03-19 DIAGNOSIS — Z30.011 OCP (ORAL CONTRACEPTIVE PILLS) INITIATION: Primary | ICD-10-CM

## 2021-03-19 DIAGNOSIS — E66.01 MORBID OBESITY (H): ICD-10-CM

## 2021-03-19 DIAGNOSIS — K21.9 GASTROESOPHAGEAL REFLUX DISEASE WITHOUT ESOPHAGITIS: ICD-10-CM

## 2021-03-19 PROBLEM — H53.453 PERIPHERAL VISION LOSS, BILATERAL: Status: ACTIVE | Noted: 2021-03-19

## 2021-03-19 PROBLEM — F84.0 AUTISM SPECTRUM DISORDER: Status: ACTIVE | Noted: 2019-09-22

## 2021-03-19 PROBLEM — Z74.09 IMPAIRED PHYSICAL MOBILITY: Status: ACTIVE | Noted: 2021-03-19

## 2021-03-19 PROBLEM — L68.0 HIRSUTISM: Status: ACTIVE | Noted: 2021-03-19

## 2021-03-19 PROBLEM — E28.2 POLYCYSTIC OVARY SYNDROME: Status: ACTIVE | Noted: 2021-03-19

## 2021-03-19 PROBLEM — G43.909 MIGRAINE: Status: ACTIVE | Noted: 2021-03-19

## 2021-03-19 PROBLEM — L30.9 ECZEMA: Status: ACTIVE | Noted: 2021-03-19

## 2021-03-19 LAB
ALBUMIN SERPL-MCNC: 4 G/DL (ref 3.4–5)
ALP SERPL-CCNC: 67 U/L (ref 40–150)
ALT SERPL W P-5'-P-CCNC: 35 U/L (ref 0–50)
ANION GAP SERPL CALCULATED.3IONS-SCNC: 2 MMOL/L (ref 3–14)
AST SERPL W P-5'-P-CCNC: 18 U/L (ref 0–45)
BASOPHILS # BLD AUTO: 0 10E9/L (ref 0–0.2)
BASOPHILS NFR BLD AUTO: 0.4 %
BILIRUB SERPL-MCNC: 0.3 MG/DL (ref 0.2–1.3)
BUN SERPL-MCNC: 8 MG/DL (ref 7–30)
CALCIUM SERPL-MCNC: 9.3 MG/DL (ref 8.5–10.1)
CHLORIDE SERPL-SCNC: 108 MMOL/L (ref 94–109)
CO2 SERPL-SCNC: 26 MMOL/L (ref 20–32)
CREAT SERPL-MCNC: 0.68 MG/DL (ref 0.52–1.04)
DIFFERENTIAL METHOD BLD: ABNORMAL
EOSINOPHIL # BLD AUTO: 0.3 10E9/L (ref 0–0.7)
EOSINOPHIL NFR BLD AUTO: 3.7 %
ERYTHROCYTE [DISTWIDTH] IN BLOOD BY AUTOMATED COUNT: 12.7 % (ref 10–15)
GFR SERPL CREATININE-BSD FRML MDRD: >90 ML/MIN/{1.73_M2}
GLUCOSE SERPL-MCNC: 88 MG/DL (ref 70–99)
HCT VFR BLD AUTO: 42.9 % (ref 35–47)
HGB BLD-MCNC: 13.4 G/DL (ref 11.7–15.7)
LITHIUM SERPL-SCNC: 0.95 MMOL/L (ref 0.6–1.2)
LYMPHOCYTES # BLD AUTO: 1.9 10E9/L (ref 0.8–5.3)
LYMPHOCYTES NFR BLD AUTO: 24.1 %
MCH RBC QN AUTO: 28.5 PG (ref 26.5–33)
MCHC RBC AUTO-ENTMCNC: 31.2 G/DL (ref 31.5–36.5)
MCV RBC AUTO: 91 FL (ref 78–100)
MONOCYTES # BLD AUTO: 0.7 10E9/L (ref 0–1.3)
MONOCYTES NFR BLD AUTO: 9.2 %
NEUTROPHILS # BLD AUTO: 4.9 10E9/L (ref 1.6–8.3)
NEUTROPHILS NFR BLD AUTO: 62.6 %
PLATELET # BLD AUTO: 236 10E9/L (ref 150–450)
POTASSIUM SERPL-SCNC: 4.4 MMOL/L (ref 3.4–5.3)
PROT SERPL-MCNC: 7.6 G/DL (ref 6.8–8.8)
RBC # BLD AUTO: 4.71 10E12/L (ref 3.8–5.2)
SODIUM SERPL-SCNC: 136 MMOL/L (ref 133–144)
T4 FREE SERPL-MCNC: 0.68 NG/DL (ref 0.76–1.46)
TSH SERPL DL<=0.005 MIU/L-ACNC: 4.17 MU/L (ref 0.4–4)
WBC # BLD AUTO: 7.8 10E9/L (ref 4–11)

## 2021-03-19 PROCEDURE — 84439 ASSAY OF FREE THYROXINE: CPT | Performed by: PHYSICIAN ASSISTANT

## 2021-03-19 PROCEDURE — 84443 ASSAY THYROID STIM HORMONE: CPT | Performed by: PHYSICIAN ASSISTANT

## 2021-03-19 PROCEDURE — 80053 COMPREHEN METABOLIC PANEL: CPT | Performed by: PHYSICIAN ASSISTANT

## 2021-03-19 PROCEDURE — 85025 COMPLETE CBC W/AUTO DIFF WBC: CPT | Performed by: PHYSICIAN ASSISTANT

## 2021-03-19 PROCEDURE — 80178 ASSAY OF LITHIUM: CPT | Performed by: PHYSICIAN ASSISTANT

## 2021-03-19 PROCEDURE — 80183 DRUG SCRN QUANT OXCARBAZEPIN: CPT | Mod: 90 | Performed by: PHYSICIAN ASSISTANT

## 2021-03-19 PROCEDURE — 86803 HEPATITIS C AB TEST: CPT | Performed by: PHYSICIAN ASSISTANT

## 2021-03-19 PROCEDURE — 99214 OFFICE O/P EST MOD 30 MIN: CPT | Performed by: PHYSICIAN ASSISTANT

## 2021-03-19 PROCEDURE — 36415 COLL VENOUS BLD VENIPUNCTURE: CPT | Performed by: PHYSICIAN ASSISTANT

## 2021-03-19 RX ORDER — LEVONORGESTREL AND ETHINYL ESTRADIOL 0.15-0.03
1 KIT ORAL DAILY
Qty: 90 TABLET | Refills: 3 | Status: SHIPPED | OUTPATIENT
Start: 2021-03-19 | End: 2022-02-07

## 2021-03-19 RX ORDER — NORGESTIMATE AND ETHINYL ESTRADIOL 0.25-0.035
1 KIT ORAL DAILY
Status: CANCELLED | OUTPATIENT
Start: 2021-03-19

## 2021-03-19 RX ORDER — FAMOTIDINE 20 MG/1
20 TABLET, FILM COATED ORAL AT BEDTIME
Qty: 90 TABLET | Refills: 3 | Status: SHIPPED | OUTPATIENT
Start: 2021-03-19 | End: 2022-02-23

## 2021-03-19 RX ORDER — ALBUTEROL SULFATE 90 UG/1
2 AEROSOL, METERED RESPIRATORY (INHALATION)
COMMUNITY
Start: 2021-03-18 | End: 2024-06-06

## 2021-03-19 RX ORDER — LEVOTHYROXINE SODIUM 50 UG/1
TABLET ORAL
Qty: 28 TABLET | Refills: 0 | Status: CANCELLED | OUTPATIENT
Start: 2021-03-19

## 2021-03-19 ASSESSMENT — MIFFLIN-ST. JEOR: SCORE: 2325.6

## 2021-03-19 NOTE — LETTER
March 23, 2021      Jane Lee  [address] [facility] [address]        Dear ,    We are writing to inform you of your test results.    Jane's thyroid level is a little off. I sent in a slightly higher dose of synthroid, synthroid 75 mcg. We should recheck this in 6 weeks with a lab only visit.   Other labs are normal/stable.     Resulted Orders   Hepatitis C Screen Reflex to HCV RNA Quant and Genotype   Result Value Ref Range    Hepatitis C Antibody Nonreactive NR^Nonreactive      Comment:      Assay performance characteristics have not been established for newborns,   infants, and children     TSH WITH FREE T4 REFLEX   Result Value Ref Range    TSH 4.17 (H) 0.40 - 4.00 mU/L   Lithium level   Result Value Ref Range    Lithium Level 0.95 0.60 - 1.20 mmol/L   Oxcarbazepine level   Result Value Ref Range    10 Hydroxy Metabolite Level 23.4 10.0 - 35.0 ug/ml      Comment:      (Note)             Therapeutic efficacy has been demonstrated in             patients with trough 10-hydroxy metabolite             concentrations of 10.0 - 35.0 ug/ml.             Toxic concentrations have not been established.  Analysis performed by [facility], [address] [place] [address]     CBC with platelets and differential   Result Value Ref Range    WBC 7.8 4.0 - 11.0 10e9/L    RBC Count 4.71 3.8 - 5.2 10e12/L    Hemoglobin 13.4 11.7 - 15.7 g/dL    Hematocrit 42.9 35.0 - 47.0 %    MCV 91 78 - 100 fl    MCH 28.5 26.5 - 33.0 pg    MCHC 31.2 (L) 31.5 - 36.5 g/dL    RDW 12.7 10.0 - 15.0 %    Platelet Count 236 150 - 450 10e9/L    % Neutrophils 62.6 %    % Lymphocytes 24.1 %    % Monocytes 9.2 %    % Eosinophils 3.7 %    % Basophils 0.4 %    Absolute Neutrophil 4.9 1.6 - 8.3 10e9/L    Absolute Lymphocytes 1.9 0.8 - 5.3 10e9/L    Absolute Monocytes 0.7 0.0 - 1.3 10e9/L    Absolute Eosinophils 0.3 0.0 - 0.7 10e9/L    Absolute Basophils 0.0 0.0 - 0.2 10e9/L    Diff Method Automated Method    Comprehensive metabolic  panel (BMP + Alb, Alk Phos, ALT, AST, Total. Bili, TP)   Result Value Ref Range    Sodium 136 133 - 144 mmol/L    Potassium 4.4 3.4 - 5.3 mmol/L    Chloride 108 94 - 109 mmol/L    Carbon Dioxide 26 20 - 32 mmol/L    Anion Gap 2 (L) 3 - 14 mmol/L    Glucose 88 70 - 99 mg/dL    Urea Nitrogen 8 7 - 30 mg/dL    Creatinine 0.68 0.52 - 1.04 mg/dL    GFR Estimate >90 >60 mL/min/[1.73_m2]      Comment:      Non  GFR Calc  Starting 12/18/2018, serum creatinine based estimated GFR (eGFR) will be   calculated using the Chronic Kidney Disease Epidemiology Collaboration   (CKD-EPI) equation.      GFR Estimate If Black >90 >60 mL/min/[1.73_m2]      Comment:       GFR Calc  Starting 12/18/2018, serum creatinine based estimated GFR (eGFR) will be   calculated using the Chronic Kidney Disease Epidemiology Collaboration   (CKD-EPI) equation.      Calcium 9.3 8.5 - 10.1 mg/dL    Bilirubin Total 0.3 0.2 - 1.3 mg/dL    Albumin 4.0 3.4 - 5.0 g/dL    Protein Total 7.6 6.8 - 8.8 g/dL    Alkaline Phosphatase 67 40 - 150 U/L    ALT 35 0 - 50 U/L    AST 18 0 - 45 U/L   T4 free   Result Value Ref Range    T4 Free 0.68 (L) 0.76 - 1.46 ng/dL       If you have any questions or concerns, please call the clinic at the number listed above.       Sincerely,      Su Travis PA-C/am

## 2021-03-20 ENCOUNTER — TELEPHONE (OUTPATIENT)
Dept: PODIATRY | Facility: CLINIC | Age: 26
End: 2021-03-20

## 2021-03-20 LAB — 10OH-CARBAZEPINE SERPL-MCNC: 23.4 UG/ML (ref 10–35)

## 2021-03-20 NOTE — TELEPHONE ENCOUNTER
Huddled with Dr Brice - patient is ok to swim today provided that toenail is healing with no drainage.    Spoke to group home staff discussed recommendation from Dr Brice.  Patient expressed understanding and had no further questions.    Valdez Nicholas ATC

## 2021-03-20 NOTE — TELEPHONE ENCOUNTER
Please call Rudy [phone] caretaker, she would like to know if patient can swim after her procedure with .

## 2021-03-21 LAB — HCV AB SERPL QL IA: NONREACTIVE

## 2021-03-22 RX ORDER — LEVOTHYROXINE SODIUM 75 UG/1
75 TABLET ORAL DAILY
Qty: 90 TABLET | Refills: 0 | Status: SHIPPED | OUTPATIENT
Start: 2021-03-22 | End: 2021-04-28

## 2021-03-22 ASSESSMENT — ANXIETY QUESTIONNAIRES
GAD7 TOTAL SCORE: 3
IF YOU CHECKED OFF ANY PROBLEMS ON THIS QUESTIONNAIRE, HOW DIFFICULT HAVE THESE PROBLEMS MADE IT FOR YOU TO DO YOUR WORK, TAKE CARE OF THINGS AT HOME, OR GET ALONG WITH OTHER PEOPLE: SOMEWHAT DIFFICULT
6. BECOMING EASILY ANNOYED OR IRRITABLE: SEVERAL DAYS
1. FEELING NERVOUS, ANXIOUS, OR ON EDGE: NOT AT ALL
5. BEING SO RESTLESS THAT IT IS HARD TO SIT STILL: NOT AT ALL
7. FEELING AFRAID AS IF SOMETHING AWFUL MIGHT HAPPEN: NOT AT ALL
3. WORRYING TOO MUCH ABOUT DIFFERENT THINGS: SEVERAL DAYS
2. NOT BEING ABLE TO STOP OR CONTROL WORRYING: SEVERAL DAYS

## 2021-03-22 ASSESSMENT — PATIENT HEALTH QUESTIONNAIRE - PHQ9
5. POOR APPETITE OR OVEREATING: NOT AT ALL
SUM OF ALL RESPONSES TO PHQ QUESTIONS 1-9: 8

## 2021-03-23 ENCOUNTER — OFFICE VISIT - HEALTHEAST (OUTPATIENT)
Dept: FAMILY MEDICINE | Facility: CLINIC | Age: 26
End: 2021-03-23

## 2021-03-23 DIAGNOSIS — E66.813 CLASS 3 SEVERE OBESITY DUE TO EXCESS CALORIES WITHOUT SERIOUS COMORBIDITY WITH BODY MASS INDEX (BMI) OF 60.0 TO 69.9 IN ADULT (H): ICD-10-CM

## 2021-03-23 DIAGNOSIS — Z71.3 NUTRITIONAL COUNSELING: ICD-10-CM

## 2021-03-23 DIAGNOSIS — E66.01 CLASS 3 SEVERE OBESITY DUE TO EXCESS CALORIES WITHOUT SERIOUS COMORBIDITY WITH BODY MASS INDEX (BMI) OF 60.0 TO 69.9 IN ADULT (H): ICD-10-CM

## 2021-03-23 ASSESSMENT — ANXIETY QUESTIONNAIRES: GAD7 TOTAL SCORE: 3

## 2021-03-24 ENCOUNTER — TRANSFERRED RECORDS (OUTPATIENT)
Dept: HEALTH INFORMATION MANAGEMENT | Facility: CLINIC | Age: 26
End: 2021-03-24

## 2021-03-25 DIAGNOSIS — Z79.899 HIGH RISK MEDICATION USE: Primary | ICD-10-CM

## 2021-03-31 ENCOUNTER — OFFICE VISIT (OUTPATIENT)
Dept: PODIATRY | Facility: CLINIC | Age: 26
End: 2021-03-31
Payer: MEDICAID

## 2021-03-31 VITALS
SYSTOLIC BLOOD PRESSURE: 128 MMHG | HEIGHT: 63 IN | WEIGHT: 293 LBS | DIASTOLIC BLOOD PRESSURE: 74 MMHG | BODY MASS INDEX: 51.91 KG/M2

## 2021-03-31 DIAGNOSIS — L60.0 INGROWN NAIL OF GREAT TOE OF LEFT FOOT: Primary | ICD-10-CM

## 2021-03-31 PROCEDURE — 99212 OFFICE O/P EST SF 10 MIN: CPT | Performed by: PODIATRIST

## 2021-03-31 ASSESSMENT — MIFFLIN-ST. JEOR: SCORE: 2317.87

## 2021-03-31 ASSESSMENT — PAIN SCALES - GENERAL: PAINLEVEL: MILD PAIN (3)

## 2021-03-31 NOTE — LETTER
3/31/2021         RE: Jane Lee  [address] [id number]        Dear Colleague,    Thank you for referring your patient, Jane Lee, to the [facility]. Please see a copy of my visit note below.    Jane presents to the office for reevaluation of the ingrown toenail on the left foot.  The patient relates caring for the treated area with no signs of infection noted.    Physical Exam:    The patient appears to be in no distress and in good spirits. Neurovascular status appears intact. The treated area appears to have no erythema or edema.  No noted drainage.    Noted slight pain on palpation of the treated area of the left second toe.  No drainage noted..    Assessment:    ICD-10-CM    1. Ingrown nail of great toe of left foot  L60.0        Plan:    At this point, I recommended continued with soaking in warm Epson salt water and application of antibiotic ointment and a Band-Aid..  The patient may return in four weeks for reevaluation or sooner if problems persist.     Jane verbalized agreement with and understanding of the rational for the diagnosis and treatment plan.  All questions were answered to best of my ability and the patient's satisfaction. The patient was advised to contact the clinic with any questions that may arise after the clinic visit.      Disclaimer: This note consists of symbols derived from keyboarding, dictation and/or voice recognition software. As a result, there may be errors in the script that have gone undetected. Please consider this when interpreting information found in this chart.    OLIVER Brice DPM, FACFAS        Again, thank you for allowing me to participate in the care of your patient.        Sincerely,        Palmer Brice DPM

## 2021-03-31 NOTE — NURSING NOTE
"Initial Ht 1.589 m (5' 2.56\")   Wt (!) 160.6 kg (354 lb)   BMI 63.59 kg/m   Estimated body mass index is 63.59 kg/m  as calculated from the following:    Height as of this encounter: 1.589 m (5' 2.56\").    Weight as of this encounter: 160.6 kg (354 lb). .    Demetra Pereira MA on 3/31/2021 at 10:00 AM    "

## 2021-04-01 NOTE — PROGRESS NOTES
Jane presents to the office for reevaluation of the ingrown toenail on the left foot.  The patient relates caring for the treated area with no signs of infection noted.    Physical Exam:    The patient appears to be in no distress and in good spirits. Neurovascular status appears intact. The treated area appears to have no erythema or edema.  No noted drainage.    Noted slight pain on palpation of the treated area of the left second toe.  No drainage noted..    Assessment:    ICD-10-CM    1. Ingrown nail of great toe of left foot  L60.0        Plan:    At this point, I recommended continued with soaking in warm Epson salt water and application of antibiotic ointment and a Band-Aid..  The patient may return in four weeks for reevaluation or sooner if problems persist.     Jane verbalized agreement with and understanding of the rational for the diagnosis and treatment plan.  All questions were answered to best of my ability and the patient's satisfaction. The patient was advised to contact the clinic with any questions that may arise after the clinic visit.      Disclaimer: This note consists of symbols derived from keyboarding, dictation and/or voice recognition software. As a result, there may be errors in the script that have gone undetected. Please consider this when interpreting information found in this chart.    OLIVER Brice DPM, FACFAS

## 2021-04-01 NOTE — PATIENT INSTRUCTIONS
Post toenail procedure instructions:    1. Soak the foot and toe in warm water with Epsom's salt or Dreft detergent for 20 min.  2. Clean the toe and the treated area with a soapy wash cloth.  3. Apply a topical antibiotic (Bacitracin, Neosporin or triple antibiotic) to the treated area and cover with a Band-Aid  4. Repeat this morning and night for two weeks, or until the treated are resolves any redness or drainage.  a. Redness and drainage is normal when treated with the Phenol acid.  b. Notify the office if there is any redness extending up the foot or purulent drainage noted.    Any discomfort should be treated with either Ibuprofen (Advil) or Tylenol.  Please follow package instructions on amount to be taken.

## 2021-04-09 DIAGNOSIS — Z79.899 HIGH RISK MEDICATION USE: ICD-10-CM

## 2021-04-09 LAB
ANION GAP SERPL CALCULATED.3IONS-SCNC: 4 MMOL/L (ref 3–14)
BASOPHILS # BLD AUTO: 0 10E9/L (ref 0–0.2)
BASOPHILS NFR BLD AUTO: 0.4 %
BUN SERPL-MCNC: 6 MG/DL (ref 7–30)
CALCIUM SERPL-MCNC: 9.2 MG/DL (ref 8.5–10.1)
CHLORIDE SERPL-SCNC: 107 MMOL/L (ref 94–109)
CHOLEST SERPL-MCNC: 194 MG/DL
CO2 SERPL-SCNC: 27 MMOL/L (ref 20–32)
CREAT SERPL-MCNC: 0.7 MG/DL (ref 0.52–1.04)
DIFFERENTIAL METHOD BLD: ABNORMAL
EOSINOPHIL # BLD AUTO: 0.5 10E9/L (ref 0–0.7)
EOSINOPHIL NFR BLD AUTO: 6.5 %
ERYTHROCYTE [DISTWIDTH] IN BLOOD BY AUTOMATED COUNT: 13.2 % (ref 10–15)
GFR SERPL CREATININE-BSD FRML MDRD: >90 ML/MIN/{1.73_M2}
GLUCOSE SERPL-MCNC: 98 MG/DL (ref 70–99)
HBA1C MFR BLD: 4.8 % (ref 0–5.6)
HCT VFR BLD AUTO: 41.7 % (ref 35–47)
HDLC SERPL-MCNC: 44 MG/DL
HGB BLD-MCNC: 13.1 G/DL (ref 11.7–15.7)
LDLC SERPL CALC-MCNC: 106 MG/DL
LITHIUM SERPL-SCNC: 0.75 MMOL/L (ref 0.6–1.2)
LYMPHOCYTES # BLD AUTO: 2.1 10E9/L (ref 0.8–5.3)
LYMPHOCYTES NFR BLD AUTO: 26.4 %
MCH RBC QN AUTO: 28.4 PG (ref 26.5–33)
MCHC RBC AUTO-ENTMCNC: 31.4 G/DL (ref 31.5–36.5)
MCV RBC AUTO: 90 FL (ref 78–100)
MONOCYTES # BLD AUTO: 0.7 10E9/L (ref 0–1.3)
MONOCYTES NFR BLD AUTO: 9 %
NEUTROPHILS # BLD AUTO: 4.5 10E9/L (ref 1.6–8.3)
NEUTROPHILS NFR BLD AUTO: 57.7 %
NONHDLC SERPL-MCNC: 150 MG/DL
PLATELET # BLD AUTO: 229 10E9/L (ref 150–450)
POTASSIUM SERPL-SCNC: 4.3 MMOL/L (ref 3.4–5.3)
PROLACTIN SERPL-MCNC: 14 UG/L (ref 3–27)
RBC # BLD AUTO: 4.62 10E12/L (ref 3.8–5.2)
SODIUM SERPL-SCNC: 138 MMOL/L (ref 133–144)
T4 FREE SERPL-MCNC: 0.7 NG/DL (ref 0.76–1.46)
TRIGL SERPL-MCNC: 218 MG/DL
TSH SERPL DL<=0.005 MIU/L-ACNC: 3.94 MU/L (ref 0.4–4)
WBC # BLD AUTO: 7.9 10E9/L (ref 4–11)

## 2021-04-09 PROCEDURE — 80178 ASSAY OF LITHIUM: CPT | Performed by: PHYSICIAN ASSISTANT

## 2021-04-09 PROCEDURE — 36415 COLL VENOUS BLD VENIPUNCTURE: CPT | Performed by: PHYSICIAN ASSISTANT

## 2021-04-09 PROCEDURE — 84443 ASSAY THYROID STIM HORMONE: CPT | Performed by: PHYSICIAN ASSISTANT

## 2021-04-09 PROCEDURE — 80061 LIPID PANEL: CPT | Performed by: PHYSICIAN ASSISTANT

## 2021-04-09 PROCEDURE — 80048 BASIC METABOLIC PNL TOTAL CA: CPT | Performed by: PHYSICIAN ASSISTANT

## 2021-04-09 PROCEDURE — 84146 ASSAY OF PROLACTIN: CPT | Performed by: PHYSICIAN ASSISTANT

## 2021-04-09 PROCEDURE — 85025 COMPLETE CBC W/AUTO DIFF WBC: CPT | Performed by: PHYSICIAN ASSISTANT

## 2021-04-09 PROCEDURE — 84439 ASSAY OF FREE THYROXINE: CPT | Performed by: PHYSICIAN ASSISTANT

## 2021-04-09 PROCEDURE — 83036 HEMOGLOBIN GLYCOSYLATED A1C: CPT | Performed by: PHYSICIAN ASSISTANT

## 2021-04-13 ENCOUNTER — OFFICE VISIT - HEALTHEAST (OUTPATIENT)
Dept: SURGERY | Facility: CLINIC | Age: 26
End: 2021-04-13

## 2021-04-13 DIAGNOSIS — G43.909 MIGRAINE WITHOUT STATUS MIGRAINOSUS, NOT INTRACTABLE, UNSPECIFIED MIGRAINE TYPE: ICD-10-CM

## 2021-04-13 ASSESSMENT — MIFFLIN-ST. JEOR: SCORE: 2154.76

## 2021-04-28 ENCOUNTER — OFFICE VISIT (OUTPATIENT)
Dept: FAMILY MEDICINE | Facility: CLINIC | Age: 26
End: 2021-04-28
Payer: MEDICAID

## 2021-04-28 VITALS
DIASTOLIC BLOOD PRESSURE: 78 MMHG | SYSTOLIC BLOOD PRESSURE: 123 MMHG | BODY MASS INDEX: 51.91 KG/M2 | HEART RATE: 80 BPM | WEIGHT: 293 LBS | TEMPERATURE: 99 F | HEIGHT: 63 IN

## 2021-04-28 DIAGNOSIS — E03.9 HYPOTHYROIDISM, UNSPECIFIED TYPE: ICD-10-CM

## 2021-04-28 DIAGNOSIS — F33.1 MODERATE EPISODE OF RECURRENT MAJOR DEPRESSIVE DISORDER (H): ICD-10-CM

## 2021-04-28 DIAGNOSIS — E66.01 MORBID OBESITY WITH BMI OF 60.0-69.9, ADULT (H): ICD-10-CM

## 2021-04-28 DIAGNOSIS — B96.89 BV (BACTERIAL VAGINOSIS): ICD-10-CM

## 2021-04-28 DIAGNOSIS — R53.83 FATIGUE, UNSPECIFIED TYPE: Primary | ICD-10-CM

## 2021-04-28 DIAGNOSIS — N89.8 VAGINAL ITCHING: ICD-10-CM

## 2021-04-28 DIAGNOSIS — N76.0 BV (BACTERIAL VAGINOSIS): ICD-10-CM

## 2021-04-28 LAB
SPECIMEN SOURCE: ABNORMAL
TSH SERPL DL<=0.005 MIU/L-ACNC: 1.91 MU/L (ref 0.4–4)
WET PREP SPEC: ABNORMAL

## 2021-04-28 PROCEDURE — 99215 OFFICE O/P EST HI 40 MIN: CPT | Performed by: PHYSICIAN ASSISTANT

## 2021-04-28 PROCEDURE — 84443 ASSAY THYROID STIM HORMONE: CPT | Performed by: PHYSICIAN ASSISTANT

## 2021-04-28 PROCEDURE — 87210 SMEAR WET MOUNT SALINE/INK: CPT | Performed by: PHYSICIAN ASSISTANT

## 2021-04-28 PROCEDURE — 36415 COLL VENOUS BLD VENIPUNCTURE: CPT | Performed by: PHYSICIAN ASSISTANT

## 2021-04-28 RX ORDER — LEVOTHYROXINE SODIUM 75 UG/1
75 TABLET ORAL DAILY
Qty: 90 TABLET | Refills: 1 | Status: SHIPPED | OUTPATIENT
Start: 2021-04-28 | End: 2021-10-08

## 2021-04-28 RX ORDER — METRONIDAZOLE 500 MG/1
500 TABLET ORAL 2 TIMES DAILY
Qty: 14 TABLET | Refills: 0 | Status: SHIPPED | OUTPATIENT
Start: 2021-04-28 | End: 2021-05-05

## 2021-04-28 ASSESSMENT — MIFFLIN-ST. JEOR: SCORE: 2332.84

## 2021-04-28 ASSESSMENT — PAIN SCALES - GENERAL: PAINLEVEL: NO PAIN (0)

## 2021-04-28 NOTE — LETTER
April 29, 2021      Jane Lee  [address] [facility] [address]        Dear ,    We are writing to inform you of your test results.    Your thyroid is in the normal range, much better than it was even two weeks ago. Continue the same dose.    Katelyn Travis PA-C/romie    Resulted Orders   TSH with free T4 reflex   Result Value Ref Range    TSH 1.91 0.40 - 4.00 mU/L   Wet prep   Result Value Ref Range    Specimen Description Vagina     Wet Prep Few  Clue cells seen   (A)     Wet Prep No yeast seen     Wet Prep No Trichomonas seen     Wet Prep Rare  WBC'S seen

## 2021-04-28 NOTE — PROGRESS NOTES
Assessment & Plan     ASSESSMENT/PLAN:      ICD-10-CM    1. Fatigue, unspecified type  R53.83 SLEEP EVALUATION & MANAGEMENT REFERRAL - ADULT -[facility]  [phone] (Age 15 and up)   2. Hypothyroidism, unspecified type  E03.9 TSH with free T4 reflex     levothyroxine (SYNTHROID/LEVOTHROID) 75 MCG tablet   3. Vaginal itching  N89.8 Wet prep   4. BV (bacterial vaginosis)  N76.0 metroNIDAZOLE (FLAGYL) 500 MG tablet    B96.89    5. Morbid obesity with BMI of 60.0-69.9, adult (H)  E66.01     Z68.44    6. Moderate episode of recurrent major depressive disorder (H)  F33.1      At risk for sleep apnea. Referral placed for evaluation  Continue working with weight management clinic.  Continue working with psychiatry, overall depression improved. Denies SI.  Will treat for BV.  Will recheck TSH after increase in synthroid.      45 minutes spent on the date of the encounter doing chart review, history and exam, documentation and further activities per the note    Return in about 6 months (around 10/28/2021).    Su Travis PA-C  [facility] is a 26 year old who presents for the following health issues  accompanied by her caregiver from group home:    HPI     Hypothyroidism Follow-up      Since last visit, patient describes the following symptoms: fatigue. She says she has been trying to lose weight      How many servings of fruits and vegetables do you eat daily?  3-4    On average, how many sweetened beverages do you drink each day (Examples: soda, juice, sweet tea, etc.  Do NOT count diet or artificially sweetened beverages)?   0    How many days per week do you exercise enough to make your heart beat faster? 6    How many minutes a day do you exercise enough to make your heart beat faster? 10 - 19    How many days per week do you miss taking your medication? 0    - she wakes up at night to urinate, likes to drink water while in bed before  "falling asleep  - fatigue. Nutritionist recommended evaluation for sleep clinic/sleep apnea  - thyroid dosing changed last month  - new  - all foods are now trying to be unprocessed/they have gotten rid of a lot of  Normal foods there.  - big stressor: Jane lived alone in room for 3-4 months, recently now has a roommate, who they do not get along really and roommate has a lot of \"behaviors\". This prompted SI, ER visit. Patient states depression is improving. She has upcoming psychiatry visit, unsure date. No active SI. She states if she had thoughts to hurt herself she would talk to staff. When she is stressed or down she likes to go outside, listen to music, be alone.   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  - has noted vaginal itching. Uses non scented soap  - has had some diarrhea. Has not mentioned that to staff before now. Has had stress, dietary changes. Has BM two times daily. Not feeling ill.    Review of Systems   Other than noted above, general, HEENT, respiratory, cardiac, MS, and gastrointestinal systems are negative.       Objective    /78   Pulse 80   Temp 99  F (37.2  C) (Tympanic)   Ht 1.589 m (5' 2.56\")   Wt (!) 162.1 kg (357 lb 4.8 oz)   BMI 64.19 kg/m    Body mass index is 64.19 kg/m .  Physical Exam   GENERAL: healthy, alert and no distress  RESP: lungs clear to auscultation - no rales, rhonchi or wheezes  CV: regular rate and rhythm, normal S1 S2, no S3 or S4, no murmur, click or rub, no peripheral edema and peripheral pulses strong  ABDOMEN: soft, nontender, no hepatosplenomegaly, no masses and bowel sounds normal   (female): normal female external genitalia   MS: no gross musculoskeletal defects noted, no edema    Results for orders placed or performed in visit on 04/28/21   TSH with free T4 reflex "     Status: None   Result Value Ref Range    TSH 1.91 0.40 - 4.00 mU/L   Wet prep     Status: Abnormal    Specimen: Vagina   Result Value Ref Range    Specimen Description Vagina     Wet Prep Few  Clue cells seen   (A)     Wet Prep No yeast seen     Wet Prep No Trichomonas seen     Wet Prep Rare  WBC'S seen

## 2021-05-10 ENCOUNTER — TRANSFERRED RECORDS (OUTPATIENT)
Dept: HEALTH INFORMATION MANAGEMENT | Facility: CLINIC | Age: 26
End: 2021-05-10

## 2021-05-11 ENCOUNTER — OFFICE VISIT - HEALTHEAST (OUTPATIENT)
Dept: FAMILY MEDICINE | Facility: CLINIC | Age: 26
End: 2021-05-11

## 2021-05-11 DIAGNOSIS — E66.813 CLASS 3 SEVERE OBESITY DUE TO EXCESS CALORIES WITHOUT SERIOUS COMORBIDITY WITH BODY MASS INDEX (BMI) OF 50.0 TO 59.9 IN ADULT (H): ICD-10-CM

## 2021-05-11 DIAGNOSIS — Z71.3 NUTRITIONAL COUNSELING: ICD-10-CM

## 2021-05-11 DIAGNOSIS — E66.01 CLASS 3 SEVERE OBESITY DUE TO EXCESS CALORIES WITHOUT SERIOUS COMORBIDITY WITH BODY MASS INDEX (BMI) OF 50.0 TO 59.9 IN ADULT (H): ICD-10-CM

## 2021-06-05 VITALS — WEIGHT: 293 LBS | BODY MASS INDEX: 53.92 KG/M2 | HEIGHT: 62 IN

## 2021-06-05 VITALS
WEIGHT: 293 LBS | DIASTOLIC BLOOD PRESSURE: 76 MMHG | SYSTOLIC BLOOD PRESSURE: 130 MMHG | HEIGHT: 62 IN | BODY MASS INDEX: 53.92 KG/M2

## 2021-06-07 PROBLEM — S04.011A: Status: ACTIVE | Noted: 2019-09-22

## 2021-06-07 PROBLEM — E03.9 HYPOTHYROIDISM, UNSPECIFIED TYPE: Chronic | Status: ACTIVE | Noted: 2019-09-22

## 2021-06-07 PROBLEM — F60.3 BORDERLINE PERSONALITY DISORDER (H): Status: ACTIVE | Noted: 2021-06-07

## 2021-06-07 PROBLEM — K21.9 GASTROESOPHAGEAL REFLUX DISEASE WITHOUT ESOPHAGITIS: Chronic | Status: ACTIVE | Noted: 2020-07-01

## 2021-06-07 PROBLEM — E03.9 HYPOTHYROIDISM, UNSPECIFIED TYPE: Status: ACTIVE | Noted: 2019-09-22

## 2021-06-07 PROBLEM — F84.0 AUTISM SPECTRUM DISORDER: Chronic | Status: ACTIVE | Noted: 2019-09-22

## 2021-06-07 PROBLEM — F33.1 MODERATE EPISODE OF RECURRENT MAJOR DEPRESSIVE DISORDER (H): Chronic | Status: ACTIVE | Noted: 2019-09-22

## 2021-06-07 PROBLEM — G43.009 MIGRAINE WITHOUT AURA AND WITHOUT STATUS MIGRAINOSUS, NOT INTRACTABLE: Chronic | Status: ACTIVE | Noted: 2019-09-22

## 2021-06-07 PROBLEM — F41.1 GAD (GENERALIZED ANXIETY DISORDER): Chronic | Status: ACTIVE | Noted: 2019-09-22

## 2021-06-07 PROBLEM — J30.1 NON-SEASONAL ALLERGIC RHINITIS DUE TO POLLEN: Status: ACTIVE | Noted: 2019-09-22

## 2021-06-07 PROBLEM — K21.9 GASTROESOPHAGEAL REFLUX DISEASE WITHOUT ESOPHAGITIS: Status: ACTIVE | Noted: 2020-07-01

## 2021-06-07 PROBLEM — Z78.9 LIVES IN GROUP HOME: Chronic | Status: ACTIVE | Noted: 2020-09-30

## 2021-06-07 PROBLEM — E73.9 LACTOSE INTOLERANCE: Status: ACTIVE | Noted: 2019-09-22

## 2021-06-07 PROBLEM — Z78.9 LIVES IN GROUP HOME: Status: ACTIVE | Noted: 2020-09-30

## 2021-06-07 PROBLEM — E66.01 MORBID OBESITY (H): Chronic | Status: ACTIVE | Noted: 2019-09-18

## 2021-06-07 PROBLEM — K59.00 CONSTIPATION, UNSPECIFIED CONSTIPATION TYPE: Status: ACTIVE | Noted: 2020-07-09

## 2021-06-07 PROBLEM — Z87.898 HISTORY OF SEIZURES: Chronic | Status: ACTIVE | Noted: 2019-09-22

## 2021-06-16 ENCOUNTER — VIRTUAL VISIT (OUTPATIENT)
Dept: FAMILY MEDICINE | Facility: CLINIC | Age: 26
End: 2021-06-16
Payer: MEDICAID

## 2021-06-16 ENCOUNTER — OFFICE VISIT - HEALTHEAST (OUTPATIENT)
Dept: FAMILY MEDICINE | Facility: CLINIC | Age: 26
End: 2021-06-16

## 2021-06-16 DIAGNOSIS — E66.813 CLASS 3 SEVERE OBESITY DUE TO EXCESS CALORIES WITHOUT SERIOUS COMORBIDITY WITH BODY MASS INDEX (BMI) OF 50.0 TO 59.9 IN ADULT (H): ICD-10-CM

## 2021-06-16 DIAGNOSIS — E66.01 CLASS 3 SEVERE OBESITY DUE TO EXCESS CALORIES WITHOUT SERIOUS COMORBIDITY WITH BODY MASS INDEX (BMI) OF 50.0 TO 59.9 IN ADULT (H): ICD-10-CM

## 2021-06-16 DIAGNOSIS — Z71.3 NUTRITIONAL COUNSELING: ICD-10-CM

## 2021-06-16 DIAGNOSIS — B37.31 YEAST INFECTION OF THE VAGINA: Primary | ICD-10-CM

## 2021-06-16 PROCEDURE — 99441 PR PHYSICIAN TELEPHONE EVALUATION 5-10 MIN: CPT | Performed by: PHYSICIAN ASSISTANT

## 2021-06-16 RX ORDER — FLUCONAZOLE 150 MG/1
150 TABLET ORAL ONCE
Qty: 1 TABLET | Refills: 0 | Status: SHIPPED | OUTPATIENT
Start: 2021-06-16 | End: 2021-06-16

## 2021-06-16 RX ORDER — ALPRAZOLAM 0.5 MG
TABLET ORAL
COMMUNITY
Start: 2021-05-10 | End: 2024-04-08

## 2021-06-16 NOTE — PATIENT INSTRUCTIONS - HE
[facility] Bariatric Basics    Remember to:    -Eat 3 meals a day (not 2, not 5) Chew your food well/SLOW down  -Eat your protein first  -Be a water drinker/Minize liquid calories (no regular pop, no juice) skim or 1% milk OK  -Sleep 7-8 hours each night. Address sleep if problematic  -Stress management is important. Address if problematic  -Move-8000 steps daily Muscle: maintain your muscle mass (strength training 2X/wk)  -Wheat, not white (bread, pasta, crackers, carmen, bagels, tortillas, rice)  -Limit restaurant, cafeteria, take out, drive through to 2 times per week or less  -Minimize caffeine, alcohol, and night-time snacking  -Consider keeping a food diary (i.e. My Fitness Pal, Lose It, or other food tracker)  -Follow up with the dietitian      **Some lean proteins: chicken, turkey, tuna, salmon, crab, fish, shrimp, scallops, lobster, lean cuts of beef and pork, luncheon meats, veggie burgers, beans (black, lima, garbanzo, shelley, kidney, refried), chile, cottage cheese, string cheese, other cheese, eggs, tofu, peanut butter, nuts, vegan crumbles, greek yogurt    Stop Depot Provera. Prefer OCPs, IUD, nexplanon  [employer] 3 times a week, stairs and push ups on alternate days, walk the dog with your mom when given the opportunity  Sleep study-call to schedule. We will call you with the number.  Dietitian-we will call to schedule  We will discuss medications and labwork at our follow up visit.

## 2021-06-16 NOTE — PROGRESS NOTES
Jane Lee is a 26 y.o. female who is being evaluated via a billable video visit.       How would you like to obtain your AVS? MyChart.  If dropped from the video visit, the video invitation should be resent by: Text to cell phone: [phone]  Will anyone else be joining your video visit? No     Video Start Time: 1:01 PM      Medical  Weight Loss Initial Diet Evaluation  Jane is presenting today for a new weight management nutrition consultation. Pt has had an initial appointment with Dr. Marcano.  Weight loss medication: None .   Nutrition Assessment:   Anthropometrics:  Pt's Initial Weight: 356.5 lbs  Weight: (!) 356 lb 8 oz (161.7 kg)  Weight loss from initial: 0  % Weight loss: 0 %    BMI: There is no height or weight on file to calculate BMI.   Ideal body weight: 50.1 kg (110 lb 7.2 oz)  Adjusted ideal body weight: 94.7 kg (208 lb 13.9 oz)  Estimated RMR ([place]-St Jeor equation):  2320 kcals x 1.3 (light active) = 3016 kcals (for weight maintenance)    Recommended Protein Intake: 60-80 grams of protein/day  Medical History:  Patient Active Problem List   Diagnosis     GERD (gastroesophageal reflux disease)     Morbid obesity with BMI of 60.0-69.9, adult (H)     Hypothyroidism     Impaired physical mobility     Eczema     Anxiety and depression     Oppositional defiant disorder     Seizures (H)     Hirsutism     Migraine     Peripheral vision loss, bilateral     Autism spectrum disorder     Polycystic ovary syndrome     Lactose intolerance      Diabetes: No   No results found for: HGBA1C  Nutrition History:   Food allergies/intolerances/cultural or religous food customs: Yes :Lactose Intolerance (milk and yogurt)   Vitamins/Mineral Supplementation: None   Dietary Recall:  Breakfast: Smoothie (PB 2 Powder, Eleno Seeds, Frozen Fruit, Lactose Free Milk) or Cereal   Lunch: leftovers from the night before   Dinner: chicken or spaghetti or chili   Typical Snacks: granola bar or banana bread   Overnight eating:  No  Eating out: 1 time or less/week   Beverages: Coffee with creamer, Hot Chocolate 1 time/night, minimal water, Crystal Light   Exercise: sit ups, push ups, walking for 20-25 minutes as able/tolerated (pending weather)     Nutrition Diagnosis (PES statement):   Overweight/Obesity (NC 3.3) related to overeating and poor lifestyle habits as evidenced by patient report of h/o excessive energy intake and BMI of 65.20 kg/m2.   Nutrition Intervention:  1. Food and/or Nutrient Delivery   a. Placed emphasis on importance of developing a healthy meal routine, aiming for 3 meals a day and no snacks.  2. Nutrition Education   a. Discussed with patient how to build a meal: the importance of including a lean/low fat protein at each meal, include a source of vegetables at a minimum of lunch and dinner and limiting carbohydrate intake to 1 serving per meal.  b. Educated on sources of lean protein, portion sizes, the amount of grams found in each source. Recommend patient to aim for 20-30g protein at each meal.  c. Discussed the importance of adequate hydration, with emphasis on drinking 64oz of water or zero calorie beverages per day.  3. Nutrition Counseling   a. Encouraged importance of developing routine exercise for health benefits and weight loss.    Goals established by patient:   1. Continue to focus on protein first, aiming for 60-80 grams of protein/day.   2. Limit portion sizes of carbohydrates to 1 serving/meal.   Handouts provided:  Non Surgical Weight Loss packet  Snack Ideas   Assessment/Plan:    Pt will follow up in 1 month(s) with bariatrician and 2 month(s) with dietitian.     Video-Visit Details    Type of service:  Video Visit    Video End Time (time video stopped): 1:30 pm   Originating Location (pt. Location): Home    Distant Location (provider location):  [facility] [place]     Platform used for Video Visit: Dekkun

## 2021-06-16 NOTE — PROGRESS NOTES
Jane is a 26 year old who is being evaluated via a billable telephone visit.      What phone number would you like to be contacted at? [phone]  How would you like to obtain your AVS? Mail a copy    Assessment & Plan     ASSESSMENT/PLAN:      ICD-10-CM    1. Yeast infection of the vagina  B37.3 fluconazole (DIFLUCAN) 150 MG tablet     Patient is in group home, would have a difficult time getting here and doing self wet prep. Patient and caregiver are agreeable with plan.    Patient Instructions   Diflucan pill one time  If itching is not improving, please follow up in clinic early next week - we will do an exam and a vaginal swab    No follow-ups on file.    ALEN Ma [facility]    Jenny Lara is a 26 year old who presents for the following health issues  accompanied by her group home caregiver:    HPI     Vaginal Symptoms  Onset/Duration: Group home says 2 days ago she started to have symptoms but jane says it never got better from her last treatment  Description:  Vaginal Discharge: white - a little bit  Itching (Pruritis): YES  Burning sensation:  no  Odor: YES  Accompanying Signs & Symptoms:  Urinary symptoms: no  Abdominal pain: no  Fever: no  History:   Sexually active: no  New Partner: no  Possibility of Pregnancy:  no  Recent antibiotic use: no  Previous vaginitis issues: YES, was just treated for BV on 4/28/2021  Precipitating or alleviating factors: None  Therapies tried and outcome: none    Since starting OCP and getting periods, itching seems to happen after her period ends. She is supposed to get period every 3 months, but since she started more recently she has had two light periods as her body is adjusting.   LMP about 2 weeks ago       Review of Systems   Other than noted above, general, HEENT, respiratory, cardiac, MS, and gastrointestinal systems are negative.       Objective           Vitals:  No vitals were obtained today due to virtual  visit.    Physical Exam   healthy, alert and no distress  PSYCH: Alert and oriented times 3; coherent speech, normal   rate and volume, able to articulate logical thoughts, able   to abstract reason, no tangential thoughts, no hallucinations   or delusions  Her affect is normal  RESP: No cough, no audible wheezing, able to talk in full sentences  Remainder of exam unable to be completed due to telephone visits        Phone call duration: 8 minutes

## 2021-06-16 NOTE — PATIENT INSTRUCTIONS
Diflucan pill one time  If itching is not improving, please follow up in clinic early next week - we will do an exam and a vaginal swab

## 2021-06-16 NOTE — PATIENT INSTRUCTIONS - HE
[facility]    Remember to:    -Eat 3 meals a day (not 2, not 5) Chew your food well/SLOW down  -Eat your protein first  -Be a water drinker/Minize liquid calories (no regular pop, no juice) skim or 1% milk OK  -Sleep 7-8 hours each night. Address sleep if problematic  -Stress management is important. Address if problematic  -Move-8000 steps daily Muscle: maintain your muscle mass (strength training 2X/wk)  -Wheat, not white (bread, pasta, crackers, carmen, bagels, tortillas, rice)  -Limit restaurant, cafeteria, take out, drive through to 2 times per week or less  -Minimize caffeine, alcohol, and night-time snacking  -Consider keeping a food diary (i.e. My Fitness Pal, Lose It, or other food tracker)  -Follow up with the dietitian      **Some lean proteins: chicken, turkey, tuna, salmon, crab, fish, shrimp, scallops, lobster, lean cuts of beef and pork, luncheon meats, veggie burgers, beans (black, lima, garbanzo, shelley, kidney, refried), chile, cottage cheese, string cheese, other cheese, eggs, tofu, peanut butter, nuts, vegan crumbles, greek yogurt    MEDICATIONS FOR WEIGHT LOSS    PHENTERMINE (Adipex): approved in 1959 for appetite suppression.  It has stimulant effects and cannot be used with Ritalin, Concerta, or other stimulants.  It is not addictive although it's chemically related to amphetamines.  Amphetamines are addictive. The most common side effects are dry mouth, increased energy and concentration, increased pulse, and constipation.  You should not take phentermine if you have glaucoma, hyperthyroidism, or uncontrolled/untreated hypertension.  $24-$30 for 90 tablets    PHENDIMETRAZINE (Bontril): Appetite suppressant/sympathomimetic.  Controlled substance.  Side effects and contraindications similar to phentermine.  $45-$60 for 3 month supply    TOPIRAMATE (Topamax): Anti-seizure medication, also used to prevent migraines.  Side effects include paresthesia, glaucoma, altered concentration,  attention difficulties, memory and speech problems, metabolic acidosis, depression, increase in body temperature and decrease sweating, kidney stones, and weight loss.  Do not take Topamax while taking Depakote as this can cause high ammonia levels.  You must have reliable birth control as Topamax can cause birth defects.  Discontinue slowly to avoid seizure.  Insurance usually covers Topiramate.    QSYMIA (Phentermine + Topamax):  See above information about phentermine and Topamax.  Most common side effects are paresthesia, dizziness, distortion of taste, insomnia, constipation, and dry mouth.  $150-$220 per month    DIETHYLPROPION (Tenuate): Sympathomimetic amine.  Appetite suppressant.  Doses 25 mg before meals or 75 mg per day.  Most common side effects are hypertension, palpitations, EKG changes, and increased seizures in epileptics.  There can be a possible adverse reaction with alcohol.  $70-$90 per 3 months    XENICAL(Orlistat) (Carlos OTC): Approved in 1999.  A fat-blocker.  It reduces absorption of fat by approximately 30%.  It has beneficial effects on lipid levels.  Side effects include diarrhea, abdominal cramping, fecal incontinence, oily spotting, and flatus with discharge.  Side effects are minimized if the patient limits their dietary fat to no more than 30% of their diet.  Patients must take a multivitamin daily to avoid vitamin D, E, A, and K deficiency.  $120 per month    CONTRAVE (naltrexone/bupropion): Approved in 2014.  It is a combination pill including an opioid receptor blocker and a long-standing antidepressant.  Most common side effects include nausea, constipation, headache, vomiting, dizziness, trouble sleeping, dry mouth, and diarrhea.  With all antidepressants watch for mood changes and suicide ideation.  Bupropion has been known to lower the seizure threshold in those prone to seizures.  It should not be used in a patient with a recent history of bulimia. It has been associated with  liver damage from taking higher than recommended doses.  Do not use countrave if you have taken opioid medications or opioid street drugs in the past 7-10 days, if you are currently on opioids, methadone, or if you are pregnant.  Do not use contrave if you have recently stopped using alcohol or benzodiazepines.  Taper off contrave slowly.  Dosing: titrate up to 2 tablets twice daily of the Naltrexone 8 mg/ Bupropion 90 mg tablets.  $200 for 90 tablets    SAXENDA (Liraglutide): A daily injectable (3mg daily) medication used for type 2 diabetes. Glucagon-like peptide-1 (GLP-1) agonist. Contraindications include personal or family history of medullary thyroid cancer or MEN type 2. Acute pancreatitis has been observed in patients taking liraglutide. Liraglutide causes C-cell tumors in rats and mice. It is unknown whether liraglutide causes tumors in humans. Start at 0.6mg, increasing the dose weekly up to 3mg.     VYVANSE (Lisdexamfetamine dimesylate): a CNS stimulant used to treat ADHD. Indicated for the treatment of moderate to severe Binge Eating Disorder in Adults. Contraindicated in patients with known heart disease, structural abnormalities of the heart, serious heart arrhythmias or unexplained syncope. CNS stimulants such as vyvanse may cause manic or psychotic symptoms in patients with BPAD or pre-existing psychosis. Use with caution in patients with Raynaud's phenomenon. Most common side effects include dry mouth, insomnia, decreased appetite, increased heart rate, jittery feeling, constipation and anxiety.

## 2021-06-16 NOTE — PROGRESS NOTES
"Jane Lee is 26 y.o.  female who presents for a billable video visit today.    How would you like to obtain your AVS? MyChart.  If dropped from the video visit, the video invitation should be resent by: Text to cell phone: [phone]  Will anyone else be joining your video visit? No        Video Start Time: 3:00 pm   Video End 3:25pm    Bariatric Follow-up    26 y.o.  female BMI:Body mass index is 58.53 kg/m .    Weight:   Wt Readings from Last 1 Encounters:   04/13/21 (!) 320 lb (145.2 kg)    pounds  Height: 5' 2\" (1.575 m) (4/13/2021  3:00 PM)  Initial Weight: 356.5 lbs (4/13/2021  3:00 PM)  Weight: (!) 320 lb (145.2 kg) (4/13/2021  3:00 PM)  Weight loss from initial: 36.5 (4/13/2021  3:00 PM)  % Weight loss: 10.24 % (4/13/2021  3:00 PM)  BMI (Calculated): 58.5 (4/13/2021  3:00 PM)  Neck Circumference (In): 19 Inches (3/18/2021  9:41 AM)      Comorbidities:  Patient Active Problem List   Diagnosis     GERD (gastroesophageal reflux disease)     Morbid obesity with BMI of 60.0-69.9, adult (H)     Hypothyroidism     Impaired physical mobility     Eczema     Anxiety and depression     Oppositional defiant disorder     Seizures (H)     Hirsutism     Migraine     Peripheral vision loss, bilateral     Autism spectrum disorder     Polycystic ovary syndrome     Lactose intolerance         Interim: Jane lost 36# since her first visit. Doing push ups, sit ups, squats, walking outside. Stopped Depot provera. Able to breathe easier. On OCPs now. Has tried metformin in the past and did not tolerate it.    Plan:  DIET  Continue 3 meals   EXERCISE continue calisthenics, walking with consistency   PHARMACOTHERAPY try topamax 25-50mg with dinner    Dietitian. Congratulated on exceptional progress     -We reviewed her medications and whether associated with weight gain.  Current Outpatient Medications on File Prior to Visit   Medication Sig Dispense Refill     albuterol (PROAIR HFA;PROVENTIL HFA;VENTOLIN HFA) 90 " mcg/actuation inhaler Inhale 2 puffs every 6 (six) hours as needed for wheezing. Please provide spacer 2 Inhaler prn     famotidine (PEPCID) 20 MG tablet TAKE 1 TABLET BY MOUTH EVERY NIGHT AT BEDTIME       lactase (LACTAID) 3,000 unit tablet TAKE 3 TABLETS BY MOUTH 3 TIMES DAILY AS NEEDED (9000 UNITS) WITH FOOD CONTAINING LACTOSE       levothyroxine (SYNTHROID, LEVOTHROID) 50 MCG tablet TAKE 1 TABLET BY MOUTH EVERY MORNING ON AN EMPTY STOMACH       lithium (ESKALITH) 450 MG CR tablet TAKE 1 TABLET BY MOUTH THREE TIMES DAILY       loratadine (CLARITIN) 10 mg tablet        OLANZapine (ZYPREXA) 5 MG tablet TAKE 1/2 TABLET BY MOUTH ONCE DAILY AT 8AM AND TAKE 1 TABLET DAILY AT 4PM       OXcarbazepine (TRILEPTAL) 600 MG tablet Take 600 mg by mouth.       polyethylene glycol (GAVILAX) 17 gram/dose powder MIX 17GM WITH 8OZ OF WATER OR OTHER BEVERAGE AND DRINK BY MOUTH ONCE DAILY       SUMAtriptan (IMITREX) 50 MG tablet TAKE 1 TABLET BY MOUTH AT ONSET OF HEADACHE, MAY REPEAT IN 2 HOURS. UP TO TWICE DAILY AS NEEDED       traZODone (DESYREL) 150 MG tablet TAKE 1 TABLET BY MOUTH DAILY AT BEDTIME       venlafaxine (EFFEXOR-XR) 75 MG 24 hr capsule Take 75 mg by mouth.       [DISCONTINUED] medroxyPROGESTERone (DEPO-PROVERA) 150 mg/mL injection Inject 150 mg into the shoulder, thigh, or buttocks every 3 (three) months.       No current facility-administered medications on file prior to visit.         We discussed [facility] including:  -eating 3 meals daily  -eating protein first  -eating slowly, chewing food well  -avoiding/limiting calorie containing beverages  -choosing wheat, not white with breads, crackers, pastas, carmen, bagels, tortillas, rice  -limiting restaurant or cafeteria eating to twice a week or less  -We discussed the importance of restorative sleep and stress management in maintaining a healthy weight.  -We discussed insulin resistance and glycemic index as it relates to appetite and weight  "control  -We discussed the National Weight Control Registry healthy weight maintenance strategies and ways to optimize metabolism.  -We discussed the importance of physical activity including cardiovascular and strength training in maintaining a healthier weight and explored viable options.    Most recent labs:      DIETARY HISTORY  Meals Per Day: 3  Eating Protein First?: yes  Food Diary: B:eggs, sausage, cat, WW toast L:leftovers hot dogs, fruits, veggies D:hot dogs, mac and cheese  Snacks Per Day: less  Typical Snack: brownie or cookie  Fluid Intake: coffee with SF, creamer, Crystal Light  Portion Control: improved  Calorie Containing Beverages: no  Alcohol per week: no  Typical Protein Food Choices: variety  Choosing Whole Grains: yes  Grocery Shopping is done by: staff  Food Preparation is done by: staff or herself  Meals at Restaurant/Cafeteria/Take Out Per Week: 1-2  Eating at the Table: yes  TV is Off During Meals: yes    Positive Changes Since Last Visit: exercising, stopped depot, lost 36#  Struggling With: sleep    Knowledgeable in Reading Food Labels:   Getting Adequate Protein: yes  Sleeping 7-8 hours/day interrupted to go to the bathroom  Stress management exercise and walking    PHYSICAL ACTIVITY PATTERNS:  Cardiovascular: walking  Strength Training: calisthenics    REVIEW OF SYSTEMS  GENERAL/CONSTITUTIONAL:  Fatigue: some  CARDIOVASCULAR:  Chest Pain with Exertion: no  PULMONARY:  Dyspnea on exertion: yes  CPAP Use:   Tobacco Use: no  GASTROINTESTINAL:  GERD/Heartburn: on meds  UROLOGIC:  Urinary Symptoms: nocturia  NEUROLOGIC:  Headaches: migraine  Paresthesias: no  PSYCHIATRIC:  Moods: was suicidal yesterday  MUSCULOSKELETAL/RHEUMATOLOGIC  Arthralgias: yes  Myalgias: yes  ENDOCRINE:  Monitoring Blood Sugars: no  Sugars Well Controlled: yes  DERMATOLOGIC:  Rashes: no    PHYSICAL EXAM: (most recent vitals and today's stated weight)  Vitals: Ht 5' 2\" (1.575 m)   Wt (!) 320 lb (145.2 kg)   BMI " "58.53 kg/m    Height: 5' 2\" (1.575 m) (4/13/2021  3:00 PM)  Initial Weight: 356.5 lbs (4/13/2021  3:00 PM)  Weight: (!) 320 lb (145.2 kg) (4/13/2021  3:00 PM)  Weight loss from initial: 36.5 (4/13/2021  3:00 PM)  % Weight loss: 10.24 % (4/13/2021  3:00 PM)  BMI (Calculated): 58.5 (4/13/2021  3:00 PM)  Neck Circumference (In): 19 Inches (3/18/2021  9:41 AM)      GEN: Pleasant and in no acute distress  PSYCH: A&OX3,     I have reviewed the note as documented above.  This accurately captures the substance of my conversation with the patient.  Thank you for the opportunity to participate in the care of your patient.    Aline Marcano MD, FAAFP  [facility]  Diplomate, American Board of Obesity Medicine    Total time spent on the date of this encounter doing: chart review, review of test results, patient visit, physical exam, education, counseling, developing plan of care, and documenting = 20 minutes.          Video-Visit Details    Type of service:  Video Visit    Originating Location (pt. Location): Home    Distant Location (provider location):  [facility] [place]     Platform used for Video Visit: Blanca    "

## 2021-06-17 NOTE — PROGRESS NOTES
"Jane Lee is a 26 y.o. female who is being evaluated via a billable video visit.       How would you like to obtain your AVS? MyChart.  If dropped from the video visit, the video invitation should be resent by: Text to cell phone: [phone]  Will anyone else be joining your video visit? No     Video Start Time: 1:01 PM      Medical  Weight Loss Follow-Up Diet Evaluation  Assessment:  Jane is presenting today for a follow up weight management nutrition consultation. Pt has had an initial appointment with Dr. Marcano.   Weight loss medication: None .   Pt's Initial Weight: 356.5 lbs  Weight: (!) 320 lb (145.2 kg)  Weight loss from initial: 36.5  % Weight loss: 10.24 %  Weight (Patient Reported): 320 lb (145.2 kg)  Height (Patient Reported): 5' 5\" (1.651 m)  BMI (Based on Pt Reported Ht/Wt): 53.25  Current Weight: 325 lbs   BMI: There is no height or weight on file to calculate BMI.  Patient weight not recorded    Estimated RMR ([place]-St Jeor equation):   2179 kcals x 1.3 (light active) = 2833 kcals (for weight maintenance)     Recommended Protein Intake: 60-80 grams of protein/day  Patient Active Problem List:  Patient Active Problem List   Diagnosis     GERD (gastroesophageal reflux disease)     Morbid obesity with BMI of 60.0-69.9, adult (H)     Hypothyroidism     Impaired physical mobility     Eczema     Anxiety and depression     Oppositional defiant disorder     Seizures (H)     Hirsutism     Migraine     Peripheral vision loss, bilateral     Autism spectrum disorder     Polycystic ovary syndrome     Lactose intolerance     Diabetes: No     Progress on goals from last visit: Patient has been working on portion control with the assistance of staff in the facility she resides in.  Patient also has been trying to work on incorporating protein as the main entree at each meal, consuming it first.  Patient reports that she has switched from white bread products to whole wheat products.      Dietary " Recall:  Breakfast: waffles with sausage or eggs or pancakes or breakfast burrito with wheat tortilla or breakfast sandwich   Lunch:leftovers from the night before   Dinner:chicken, vegetables and grain (staff has been measuring out the portions ahead of time)   Typical snacks: occasional cookie, fruit (banana or strawberries or blueberries or peaches)   Eating out: 1 time/month   Beverages: Coffee, Lactaid Milk, Water, Juice (Crystal Light)   Exercise: [employer]-3 times/week treadmill for 30 minutes; sit ups and push ups 6 times/week, some walking outside (weather pending)   [facility].   Per staff, patient is going to be set up with a  to do a fitness evaluation as well     Nutrition Diagnosis:    Overweight/Obesity (NC 3.3) related to overeating and poor lifestyle habits as evidenced by patient's subjective statements (h/o excessive energy intake) and BMI of 59.6 kg/m2.     Intervention:  1. Food and/or nutrient delivery: balanced meals, adequate protein  2. Nutrition education: portion control, plate method   3. Nutrition counseling: exercise regimen       Monitoring/Evaluation:    Goals:  1. Continue to work on portion sizes at meal, trial of utilizing the 9 inch plate method.   2. Continue to work on increased exercise daily.   3. Continue to focus on protein first and as the main entree at each meal.     Patient to follow up in 2 week(s) with bariatrician and 1 month(s) with RD    Video-Visit Details    Type of service:  Video Visit    Video End Time (time video stopped): 1:15 pm  Originating Location (pt. Location): Home    Distant Location (provider location):  [facility]RICS [facility] [place]     Platform used for Video Visit: [employer]

## 2021-06-26 NOTE — PROGRESS NOTES
Jane Lee is a 26 y.o. female who is being evaluated via a billable video visit.       How would you like to obtain your AVS? MyChart.  If dropped from the video visit, the video invitation should be resent by: Text to cell phone: [phone]  Will anyone else be joining your video visit? No     Video Start Time: 1:03 PM      Medical  Weight Loss Follow-Up Diet Evaluation  Assessment:  Jane is presenting today for a follow up weight management nutrition consultation. Pt has had an initial appointment with Dr. Marcano.   Weight loss medication: None .   Pt's No data recorded  BMI: There is no height or weight on file to calculate BMI.  Patient weight not recorded  Initial Weight: 356.5 lbs  Current Weight: 325 lbs     Estimated RMR ([place]-St Jeor equation):   2179 kcals x 1.3 (light active) = 2833 kcals (for weight maintenance)     Recommended Protein Intake: 60-80 grams of protein/day  Patient Active Problem List:  Patient Active Problem List   Diagnosis     GERD (gastroesophageal reflux disease)     Morbid obesity with BMI of 60.0-69.9, adult (H)     Hypothyroidism     Impaired physical mobility     Eczema     Anxiety and depression     Oppositional defiant disorder     Seizures (H)     Hirsutism     Migraine     Peripheral vision loss, bilateral     Autism spectrum disorder     Polycystic ovary syndrome     Lactose intolerance     Diabetes: No     Progress on goals from last visit: Patient reports that she feels the nutrition piece is going well.  Patient reports that she is focusing on protein at meal times (I.e. chicken) along with incorporating vegetables into her meals.  Patient reports that she is trying to work on smaller portions at meals, noting that staff helps her with this.      Typical snacks: Smoothie, Cookies   Beverages: Chocolate Milk/White Milk 1 1/2 cups/day, Water, Crystal Light (usually drinks it at night), Coffee  Exercise: Walking, Push Ups and Sit Ups in the evening prior to bed    [employer] 3 times/week (however last week it was only once)-treadmill, weights for 30 minutes     Nutrition Diagnosis:    Overweight/Obesity (NC 3.3) related to overeating and poor lifestyle habits as evidenced by patient's subjective statements (h/o excessive energy intake) and BMI of 59.6 kg/m2.     Intervention:  1. Food and/or nutrient delivery: balanced meals, adequate protein   2. Nutrition education: portion sizes, treats   3. Nutrition counseling: exercise regimen     Monitoring/Evaluation:    Goals:  1. Walk for 30 minutes daily.  Download michele on phone to track steps.   2. Continue to work on smaller portions at meals (making 1/2 the plate non starchy vegetables, 1/4 the plate protein, and 1/4 plate starch).  Continue to have staff help with portion sizes.     Patient to follow up in 1 months(s) with RD    Video-Visit Details    Type of service:  Video Visit    Video End Time (time video stopped): 1:24 PM  Originating Location (pt. Location): Home    Distant Location (provider location):  [facility]ATRICS [facility] [place]     Platform used for Video Visit: [employer]

## 2021-06-30 NOTE — PROGRESS NOTES
Progress Notes by Aline Kwong MD at 3/18/2021 10:00 AM     Author: Aline Kwong MD Service: -- Author Type: Physician    Filed: 3/18/2021 12:22 PM Encounter Date: 3/18/2021 Status: Signed    : Aline Kwong MD (Physician)       BARIATRIC CONSULTATION    Impression: Jane Lee is a 25 y.o. year old female with  has a past medical history of Anxiety and depression, Autism, Autism spectrum disorder, Cellulitis, Dyslipidemia, Eczema, GERD (gastroesophageal reflux disease), Hirsutism, seizure disorder, Hypothyroidism, Impaired physical mobility, Lactose intolerance, Migraine, Morbid obesity with BMI of 60.0-69.9, adult (H), Oppositional defiant disorder, Peripheral vision loss, bilateral, Polycystic ovary syndrome, Seasonal allergies, and Seizures (H).  Poor functional capacity and musculoskeletal disability in the setting of the abovementioned weight related co-morbidities. Her Body mass index is 65.2 kg/m . Jane would like to lose weight so that she can get a horse and be able to ride it. She has good insight in to preventing diabetes as it runs in her family. Her mother had weight loss surgery. Jane has tried metformin in the past but had GI side effects and it was stopped. She has been unable to lose weight in the past.     Plan: DIET: Protein first discussed to keep insulin levels down. 3 meals, minimize snacks. She will work with the dieitian.   EXERCISE [employer] 3X/wk and stairs and push ups other days, Walk the dog with your mom when the opportunity arises   REFERRAL(S) sleep study, dietitian,    PHARMACOTHERAPY  Discontinue depot provera d/t weight gain associated with its use. Prefer OCPs, Mirena, Implanon. Will discuss medications. Intolerant to metformin (GI). Consider topamax, naltrexone, low dose phentermine. Will consider labs next visit. Pt had back to back appointments today so had to go.    We discussed [facility] Bariatric Basics including:  -eating 3 meals  daily  -eating protein first  -eating slowly, chewing food well  -avoiding/limiting calorie containing beverages  -choosing wheat, not white with breads, crackers, pastas, carmen, bagels, tortillas, rice  -limiting restaurant or cafeteria eating to twice a week or less    We discussed the importance of restorative sleep and stress management in maintaining a healthy weight.    We reviewed medications associated with weight gain.    We discussed insulin resistance and glycemic index as it relates to appetite and weight control.     We discussed the National Weight Control Registry healthy weight maintenance strategies and ways to optimize metabolism.  We discussed the importance of physical activity including cardiovascular and strength training in maintaining a healthier weight and explored viable options.    We discussed medications available for weight loss including Phentermine, Phendimetrazine, Topamax, Qsymia, Lorcaserin, Diethylproprion, Orlistat, Contrave, Saxenda, and Vyvanse. We discussed the risks and benefits of each. We discussed indications, contraindications, potential side effects, and estimated costs of each. Literature was provided. Jane understands that not using a weight loss medication is an option.   60 minutes spent with patient. >50% in counseling.      History Surrounding Consultation  Struggles with weight started elementary  Her weight at age 20 was 300# She started Depot provera at age 17 or so  She has had several past supervised and unsupervised weight loss attempts  The most weight lost was: none  Unfortunately there was not durable weight maintenance.  History of bulimia, anorexia, or binge eating disorder? no  If Present has eating disorder been in remission at least 3 years? NA  Night time eating? no    Dietary History  Meals per day: 3  Snacks: yes  Typical Snack: granola bars, fruit snacks, cereal, yogurt, veggie straws  Who does the grocery shopping? She buys her own snack, group  "home buys the rest  Who does the cooking? Group home  A typical meal includes: B: cereal (frosted flakes) Lactaid, S: L: leftover tortellini, sandwiches , soup, white D: chile  Regular Pop: none  Juice: apple juice, OJ, snack time-crystal light  Caffeine: 2 decaf, 2 regular with creamer in the decaf  Amount of restaurant eating per week: 0-1  Eating a the table with the TV off? yes    Physical Activity Patterns  Current physical activity routine includes: likes to walk up and down the stairs, Has a [employer] membership, exercise bike, push ups, dances    Limitations from being physically active on a regular basis includes: weather sometimes, COVID    She describes her general health as: good    Past Medical History  HTN: no  Dyslipidemia: yes  LARRY: no  Obesity Hypoventilation: NO  DM2: no DM1: no DX: no Most recent AIC: 4.9  Neuropathy: no  Nephropathy: no  Retinopathy: no Glaucoma no  IFG or \"pre-DM\": no  MI: no  CVA:no  CHF: no  Heart Valves: native  Previous cardiac testing includes:   Cancers: no  Kidney Disease: no  DVT: no  PE: no  Colitis: no  Crohn's: no  IBS: no  PUD: no  Fatty Liver: no  Abnormal LFTs: no  Hepatitis: no  Asthma: no  Bronchitis: no  Pneumonia: no  Other Lung Problems: no  Back Pain:no  DDD: no  Gout: no  Fibromyalgia: no  USI: no  Severe Headaches: yes  Seizures: yes If so, last seizure: 2010  Pseudotumor: no  PCOS: yes  Menstrual Irregularity: depot  Menorrhagia: na  Infertility: depot  Thyroid problems: yes  Thyroid medications: yes  HIV positive: NO  MRSA/VRE history: no  History of Blood transfusion: no  Anemia: no    Health Care Maintenance  Colonoscopy: NA  Mammogram: NA  Pap: UTD    Medications   Current Outpatient Medications   Medication Sig Dispense Refill   ? famotidine (PEPCID) 20 MG tablet TAKE 1 TABLET BY MOUTH EVERY NIGHT AT BEDTIME     ? lactase (LACTAID) 3,000 unit tablet TAKE 3 TABLETS BY MOUTH 3 TIMES DAILY AS NEEDED (9000 UNITS) WITH FOOD CONTAINING LACTOSE     ? " levothyroxine (SYNTHROID, LEVOTHROID) 50 MCG tablet TAKE 1 TABLET BY MOUTH EVERY MORNING ON AN EMPTY STOMACH     ? lithium (ESKALITH) 450 MG CR tablet TAKE 1 TABLET BY MOUTH THREE TIMES DAILY     ? loratadine (CLARITIN) 10 mg tablet      ? medroxyPROGESTERone (DEPO-PROVERA) 150 mg/mL injection Inject 150 mg into the shoulder, thigh, or buttocks every 3 (three) months.     ? OLANZapine (ZYPREXA) 5 MG tablet TAKE 1/2 TABLET BY MOUTH ONCE DAILY AT 8AM AND TAKE 1 TABLET DAILY AT 4PM     ? OXcarbazepine (TRILEPTAL) 600 MG tablet Take 600 mg by mouth.     ? polyethylene glycol (GAVILAX) 17 gram/dose powder MIX 17GM WITH 8OZ OF WATER OR OTHER BEVERAGE AND DRINK BY MOUTH ONCE DAILY     ? SUMAtriptan (IMITREX) 50 MG tablet TAKE 1 TABLET BY MOUTH AT ONSET OF HEADACHE, MAY REPEAT IN 2 HOURS. UP TO TWICE DAILY AS NEEDED     ? traZODone (DESYREL) 150 MG tablet TAKE 1 TABLET BY MOUTH DAILY AT BEDTIME     ? venlafaxine (EFFEXOR-XR) 75 MG 24 hr capsule Take 75 mg by mouth.     ? albuterol (PROAIR HFA;PROVENTIL HFA;VENTOLIN HFA) 90 mcg/actuation inhaler Inhale 2 puffs every 6 (six) hours as needed for wheezing. Please provide spacer 2 Inhaler prn     No current facility-administered medications for this visit.      Allergies   Glucophage [metformin] and Lamotrigine  Past Surgical History  Past Surgical History:   Procedure Laterality Date   ? BRAIN SURGERY      for epilepsy age 4   ? TOENAIL EXCISION Right     ingrowing toenail removal    ? WISDOM TOOTH EXTRACTION       History of problems with anesthesia: no  History of Malignant Hyperthermia: NO    Gynecological History  Menarche: 16  Regular: no  Currently: none  Problems getting pregnant: NA  MD Involvement: NA If so, explanation/Diagnosis: NA  : 0  Para: 0  C-S: 0  Vaginal deliveries: 0  SAB:0  EAB: 0  Gestational DM: 0  Gestational HTN: 0  Preeclampsia: 0  Current Birth Control: abstinence/ birth control    Family History  family history includes Bipolar disorder in  "her mother; Diabetes in her mother; Obesity in her mother.    Social History  Status: single. Engaged  Children: no  Work Status: not working      Addiction History  Smoking History:   Started smoking: never Quit smoking: NA Total years of tobacco use: 0  Alcohol use: none  Current or Past history of alcohol or substance abuse: NA  Last used: NA  Chemical Dependency Treatment History: NA  Chemicals: NA    Psychiatric History  Diagnoses: depression,   Treated by: psychiatrist  Psychiatric Hospitalizations: no  Suicide attempts: no  ECT: no  Panic attacks: no  History of Abuse: no    Palliative Medicine History  Involvement in a pain clinic: no    ROS  Sleep  Snoring: yes  PND: no  Witnessed Apneas: no  [place]: 11  STOP BAN/8   General  Fatigue: yes  Sleep Quality:nocturia  HEENT  Visual changes: peripheral vision loss since surgery on her brain age 4  Gastrointestinal  Heartburn:no  Dysphagia: no  Cardiovascular  Murmur: no  Elevated BP: no  Chest Pain with Exertion: no  Dyspnea with Exertion: yes  Palpitations: no  Lower Extremity Edema: yes  Syncope: no  Pulmonary  Shortness of breath at rest: no  Snoring: yes  PND: no  Wheezing: yes  CPAP use: no  Gastrointestinal  Trouble swallowing:no  Heartburn: no  HX UGI/EGD: no  Abdominal pain: no  Hematochezia: no  Urologic  Hesitancy: no  Urgency: no  Genitourinary  ED:   Menorrhagia: no  Dysmenorrhea: no  Neurologic  Severe headache:yes  Paresthesias: no  Psychiatric  Moods Stable: yes  Hallucinations: no  Rheumatologic  Myalgias: yes  Arthralgias: yes  Endocrine  Polydipsia: no  Polyuria: no  Galactorrhea: no  Heat intolerance: yes  Hirsutism: yes  Musculoskeletal  Joint pain;ankle, feet  Falls: no  Use of cane, crutch or motorized scooter: no  Hematologic  Abnormal Bleeding or Clotting: no  Dermatologic  Skin Tags: no  Striae: yes  F[place]cless: no  Acne: yes  Intertrigo: yes  Lower Leg ulcers: no      Physical Exam  Height: 5' 2\" (1.575 m) (3/18/2021  9:41 " "AM)  Initial Weight: 356.5 lbs (3/18/2021  9:41 AM)  Weight: (!) 356 lb 8 oz (161.7 kg) (3/18/2021  9:41 AM)  Weight loss from initial: 0 (3/18/2021  9:41 AM)  % Weight loss: 0 % (3/18/2021  9:41 AM)  BMI (Calculated): 65.2 (3/18/2021  9:41 AM)  Neck Circumference (In): 19 Inches (3/18/2021  9:41 AM)        General Appearance  No acute distress. Obesity: central  Alert: yes  Sleepy: no  HEENT  PERRLA, EOMI  Neck  Stout: yes 19\" No carotid bruits  Airway: 1+  Cardiovascular  Rhythm regular Rate Regular  Murmur: no  Pulmonary  [place] Score: 11  Lungs clear to ascultation  Abdomen  No rashes.   Post surgical Scars: no  Extremities:  Pitting edema: trace  Palpable distal pulses: 2+  Varicose veins: no  Neurologic  Tremors: episodic   Psychiatric  Thought Content Organized  Mood appears stable  Endocrine  Moon Facies: NO  Dorsal Thoracic Prominence: NO  Skin tags: no  Acanthosis nigricans: yes  Dermatologic  Intertrigo: no          Total time spent on the date of this encounter doing: chart review, review of test results, patient visit, physical exam, education, counseling, developing plan of care, and documenting = 60 minutes.               "

## 2021-07-12 ENCOUNTER — OFFICE VISIT (OUTPATIENT)
Dept: FAMILY MEDICINE | Facility: CLINIC | Age: 26
End: 2021-07-12
Payer: MEDICAID

## 2021-07-12 VITALS
SYSTOLIC BLOOD PRESSURE: 132 MMHG | BODY MASS INDEX: 51.91 KG/M2 | HEIGHT: 63 IN | OXYGEN SATURATION: 97 % | RESPIRATION RATE: 18 BRPM | TEMPERATURE: 99.4 F | DIASTOLIC BLOOD PRESSURE: 83 MMHG | HEART RATE: 70 BPM | WEIGHT: 293 LBS

## 2021-07-12 DIAGNOSIS — N89.8 VAGINAL IRRITATION: Primary | ICD-10-CM

## 2021-07-12 DIAGNOSIS — M72.2 PLANTAR FASCIITIS: ICD-10-CM

## 2021-07-12 LAB
CLUE CELLS: ABNORMAL
TRICHOMONAS, WET PREP: ABNORMAL
WBC'S/HIGH POWER FIELD, WET PREP: ABNORMAL
YEAST, WET PREP: ABNORMAL

## 2021-07-12 PROCEDURE — 99213 OFFICE O/P EST LOW 20 MIN: CPT | Performed by: PHYSICIAN ASSISTANT

## 2021-07-12 PROCEDURE — 87210 SMEAR WET MOUNT SALINE/INK: CPT | Performed by: PHYSICIAN ASSISTANT

## 2021-07-12 RX ORDER — CLOTRIMAZOLE 1 %
CREAM (GRAM) TOPICAL 2 TIMES DAILY
Qty: 113 G | Refills: 1 | Status: SHIPPED | OUTPATIENT
Start: 2021-07-12 | End: 2021-07-21

## 2021-07-12 ASSESSMENT — MIFFLIN-ST. JEOR: SCORE: 2285.67

## 2021-07-12 NOTE — PROGRESS NOTES
Assessment & Plan     ASSESSMENT/PLAN:      ICD-10-CM    1. Vaginal irritation  N89.8 Wet prep - Clinic Collect     clotrimazole (LOTRIMIN) 1 % external cream   2. Plantar fasciitis  M72.2      Suspect vaginal irritation is due to hygiene, discussed this with patient and caregiver. Will trial clotrimazole cream as well. Negative wet prep.    Patient Instructions   Plantar fasciitis  Try to reduce walking on hard surfaces. Wear shoes with arch support in the house  Roll your foot over a tennis ball or frozen water bottle  ABC foot exercises every morning before you get out of bed, and when sitting for a long time  Let me know if not improving    We will let you know about the wet prep results  Use baby wipes after bathroom use - trial period 3 weeks, longer if this is helpful    Return in about 1 week (around 7/19/2021) for if not improving or if worsening.    ALEN [facility] is a 26 year old who presents for the following health issues  accompanied by her group home caregiver:    HPI     Vaginal Symptoms  Onset/Duration: 1 week  Description:  Vaginal Discharge: white   Itching (Pruritis): YES  Burning sensation:  YES, from her scratching  Odor: YES  Accompanying Signs & Symptoms:  Urinary symptoms: no  Abdominal pain: no  Fever: no  History:   Sexually active: no  New Partner: no  Possibility of Pregnancy:  no  Recent antibiotic use: no  Previous vaginitis issues: YES  Precipitating or alleviating factors: None  Therapies tried and outcome: Diflucan, once that was finished symptoms    Does not feel the diflucan helped    *  Left heel pain off and on for the last 2 months  No injury  Hurts when she is walking long distances, but really bad when she gets out of bad  Had been walking, abdominal crunches    Not sexually active, declines STD screen      Review of Systems   Other than noted above, general, HEENT, respiratory, cardiac, MS, and  "gastrointestinal systems are negative.       Objective    /83   Pulse 70   Temp 99.4  F (37.4  C) (Tympanic)   Resp 18   Ht 1.589 m (5' 2.56\")   Wt (!) 157.4 kg (346 lb 14.4 oz)   SpO2 97%   BMI 62.32 kg/m    Body mass index is 62.32 kg/m .  Physical Exam   GENERAL: healthy, alert and no distress   (female): normal female external genitalia, normal urethral meatus, vaginal mucosa. Did note TP in vaginal area, fecal debris on sheet when patient stood up  MS: no gross musculoskeletal defects noted, no edema  ORTHO:   Foot Exam: Inspection:  no swelling  Tender::plantar fascia, plantar heel - left  Non-tender:no other TTP   Range of Motion:flexion of toes:  full, extension of toes  Full  ANKLE  Inspection:Swelling:none   Tender:none  Range of Motion:dorsiflexion:  full, plantarflexion:  full, inversion:  full, eversion:  full  Strength:full        Results for orders placed or performed in visit on 07/12/21   Wet prep - Clinic Collect     Status: Abnormal    Specimen: Vagina; Swab   Result Value Ref Range    Trichomonas Absent Absent    Yeast Absent Absent    Clue Cells Absent Absent    WBCs/high power field 2+ (A) None           "

## 2021-07-12 NOTE — PATIENT INSTRUCTIONS
Plantar fasciitis  Try to reduce walking on hard surfaces. Wear shoes with arch support in the house  Roll your foot over a tennis ball or frozen water bottle  ABC foot exercises every morning before you get out of bed, and when sitting for a long time  Let me know if not improving    We will let you know about the wet prep results  Use baby wipes after bathroom use - trial period 3 weeks, longer if this is helpful

## 2021-07-13 ENCOUNTER — VIRTUAL VISIT (OUTPATIENT)
Dept: FAMILY MEDICINE | Facility: CLINIC | Age: 26
End: 2021-07-13
Payer: MEDICAID

## 2021-07-13 DIAGNOSIS — K21.9 GASTROESOPHAGEAL REFLUX DISEASE WITHOUT ESOPHAGITIS: ICD-10-CM

## 2021-07-13 DIAGNOSIS — E66.813 CLASS 3 SEVERE OBESITY DUE TO EXCESS CALORIES WITHOUT SERIOUS COMORBIDITY WITH BODY MASS INDEX (BMI) OF 60.0 TO 69.9 IN ADULT (H): Primary | ICD-10-CM

## 2021-07-13 DIAGNOSIS — Z71.3 NUTRITIONAL COUNSELING: ICD-10-CM

## 2021-07-13 DIAGNOSIS — E66.01 CLASS 3 SEVERE OBESITY DUE TO EXCESS CALORIES WITHOUT SERIOUS COMORBIDITY WITH BODY MASS INDEX (BMI) OF 60.0 TO 69.9 IN ADULT (H): Primary | ICD-10-CM

## 2021-07-13 PROCEDURE — 97802 MEDICAL NUTRITION INDIV IN: CPT | Mod: 95 | Performed by: DIETITIAN, REGISTERED

## 2021-07-13 NOTE — PROGRESS NOTES
Jane Lee is a 26 y.o. female who is being evaluated via a billable video visit.        How would you like to obtain your AVS? MyChart.  If dropped from the video visit, the video invitation should be resent by: Text to cell phone: [phone]  Will anyone else be joining your video visit? No      Video Start Time: 1:06 PM      Medical  Weight Loss Follow-Up Diet Evaluation  Assessment:  Jane is presenting today for a follow up weight management nutrition consultation. Pt has had an initial appointment with Dr. Marcano  Weight loss medication: None .   Current Weight: 346.9 lbs (patient reports that she has gained weight due to lack of exercise)    BMI: There is no height or weight on file to calculate BMI.  Ideal body weight: 51.4 kg (113 lb 4.6 oz)  Adjusted ideal body weight: 93.8 kg (206 lb 11.7 oz)    Estimated RMR (Mitchell-St Jeor equation):   2287 kcals x 1.2 (sedentary) = 2744 kcals (for weight maintenance)     Recommended Protein Intake: 60-80 grams of protein/day  Patient Active Problem List:  Patient Active Problem List   Diagnosis     Morbid obesity (H)     Autism spectrum disorder     Moderate episode of recurrent major depressive disorder (H)     Hypothyroidism, unspecified type     YARA (generalized anxiety disorder)     Mild intellectual disability     Attention deficit hyperactivity disorder (ADHD), unspecified ADHD type     Oppositional defiant disorder     History of seizures     Injury of right optic nerve, initial encounter     Lactose intolerance     Migraine without aura and without status migrainosus, not intractable     Non-seasonal allergic rhinitis due to pollen     Gastroesophageal reflux disease without esophagitis     Constipation, unspecified constipation type     Lives in group home     Polycystic ovary syndrome     Peripheral vision loss, bilateral     Impaired physical mobility     Hirsutism     Eczema     Borderline personality disorder (H)     Diabetes: No     Progress on  goals from last visit: Patient reports that she has not been doing so well in terms of portions, noting that it tends to be an issue at meals and snacks.  Staff reports that they do try and suggest an appropriate portion at meals and snacks, utilizing the myplate as a guide, however patient has the tendency to refuse suggestions and continues to ask for more, which they staff is obligated to give her from an ethical standpoint.  Patient notes that she just feels like sometimes it is hard to stop at just one, noting that it tends to occur more often with cookies at snack time.      Beverages: Water, Crystal Light (patient reports that she is not drinking enough and needs to work on this)  Exercise: stretching, hasn't been able to walk due to pain issues with her foot.  Just saw MD regarding this yesterday, plan is to stretch and wear tennis shoes consistently and then go from there in terms of her level of pain and ability to exercise.     Nutrition Diagnosis:    Overweight/Obesity (NC 3.3) related to overeating and poor lifestyle habits as evidenced by patient's subjective statements (excessive energy intake, lack of exercise) and BMI 62.32 kg/m2.       Intervention:  1. Food and/or nutrient delivery: balanced meals, adequate protein   2. Nutrition education: portion sizes, snacking   3. Nutrition counseling: exercise    Monitoring/Evaluation:    Goals:  1. If needing a second portion at meals, try to only have a second portion of the vegetable.   2. Drink a big glass of water prior to snacking to help feel full and prevent overeating at snack time.     Patient to follow up in 1 months(s) with RD    Video-Visit Details     Type of service:  Video Visit     Video End Time (time video stopped): 1:21 PM  Originating Location (pt. Location): Home     Distant Location (provider location):  [facility] [place]      Platform used for Video Visit: Oonair

## 2021-07-21 VITALS — HEIGHT: 63 IN | WEIGHT: 293 LBS | BODY MASS INDEX: 51.91 KG/M2

## 2021-07-21 ASSESSMENT — MIFFLIN-ST. JEOR: SCORE: 2280.64

## 2021-07-21 NOTE — PATIENT INSTRUCTIONS
Your BMI is Body mass index is 62.28 kg/m .  Weight management is a personal decision.  If you are interested in exploring weight loss strategies, the following discussion covers the approaches that may be successful. Body mass index (BMI) is one way to tell whether you are at a healthy weight, overweight, or obese. It measures your weight in relation to your height.  A BMI of 18.5 to 24.9 is in the healthy range. A person with a BMI of 25 to 29.9 is considered overweight, and someone with a BMI of 30 or greater is considered obese. More than two-thirds of American adults are considered overweight or obese.  Being overweight or obese increases the risk for further weight gain. Excess weight may lead to heart disease and diabetes.  Creating and following plans for healthy eating and physical activity may help you improve your health.  Weight control is part of healthy lifestyle and includes exercise, emotional health, and healthy eating habits. Careful eating habits lifelong are the mainstay of weight control. Though there are significant health benefits from weight loss, long-term weight loss with diet alone may be very difficult to achieve- studies show long-term success with dietary management in less than 10% of people. Attaining a healthy weight may be especially difficult to achieve in those with severe obesity. In some cases, medications, devices and surgical management might be considered.  What can you do?  If you are overweight or obese and are interested in methods for weight loss, you should discuss this with your provider.     Consider reducing daily calorie intake by 500 calories.     Keep a food journal.     Avoiding skipping meals, consider cutting portions instead.    Diet combined with exercise helps maintain muscle while optimizing fat loss. Strength training is particularly important for building and maintaining muscle mass. Exercise helps reduce stress, increase energy, and improves fitness.  Increasing exercise without diet control, however, may not burn enough calories to loose weight.       Start walking three days a week 10-20 minutes at a time    Work towards walking thirty minutes five days a week     Eventually, increase the speed of your walking for 1-2 minutes at time    In addition, we recommend that you review healthy lifestyles and methods for weight loss available through the National Institutes of Health patient information sites:  http://win.niddk.nih.gov/publications/index.htm    And look into health and wellness programs that may be available through your health insurance provider, employer, local community center, or debby club.    Weight management plan: Patient was referred to their PCP to discuss a diet and exercise plan.    - Set an alarm for 10 AM every day and get up, move alarm earlier by 1 hour every week until its 8 AM  - Sit on patio in sunlight for 1 hour every morning when you first get up.   - Don't get into bed until 9 hours before alarm goes off  - Don't use the tablet in the bed, only sleep there  - Limits nap to 1 hour a day (two 30 minute naps or one 1 hour nap), set an alarm  - Melatonin 1 mg, 4 hours prior to bedtime

## 2021-07-21 NOTE — PROGRESS NOTES
.Jane is a 26 year old who is being evaluated via a billable video visit.      How would you like to obtain your AVS? Mail a copy  If the video visit is dropped, the invitation should be resent by: Text to cell phone: [phone]  Will anyone else be joining your video visit? No    Danii Cox CMA    Video Start Time: 2:59 PM     Video-Visit Details    Type of service:  Video Visit    Video End Time:3:43 PM    Originating Location (pt. Location): Home    Distant Location (provider location):  [facility]     Platform used for Video Visit: MyPublisher       Sleep Consultation:    Date on this visit: 7/22/2021    Jane Lee is sent by Su Travis for a sleep consultation regarding fatigue.    Primary Physician: Su Travis     Chief Complaint   Patient presents with     Sleep Problem     Needs a sleep study-  trouble sleeping      Hope (staff) assists with visit and provides some history. History is at times unclear. She is tangential at times.     Jane goes to bed at 10:00 PM during the week. She gets up at 11:00 AM without an alarm. Jane has difficulty falling asleep every night, taking an hour.She falls asleep in 60 minutes. This has been a problem her whole life. She uses a tablet when she gets int to bed for an hour. She tosses and turns. She denies pain and is comfortable.  She says she thinks about things like her history of seizures when she is trying to fall asleep. She wakes up 2-3 times a night. She relates this to seizures, though care giver points out she has not been having any seizures. Her 'mom tells her this'. She says she falls back asleep okay. Then she says she is sometimes awake until 'they are gone'. She states her brain will not be quiet and there is a buzzing noise in her head. She sometimes wakens to a 'loud noise'. Then she admits she does have trouble falling back to sleep every night. Jane wakes up to uncertain reasons and 'noise'.   On weekends, schedule is similar.      Jane does not snore that anyone is aware. Patient does not have a regular bed partner roommate.     She denies frequent morning headaches.     Jane has no sleep walking, sleep talking, dream enactment, sleep paralysis, cataplexy and hypnogogic/hypnopompic hallucinations.    Patient's Shidler Sleepiness score 10/24 consistent with excessive  daytime sleepiness.      Jane naps 1-3 times per day for up to 3 hours. Hope notes that when patient is awoken from naps she will shriek, almost every time.   She takes no inadvertant naps.  She uses 2 cups/day of coffee.     Patient is independent in cares, want's others to do things for herself, is able to get to the bathroom    She takes xanaz 2-3 times a month at most      Recent Labs   Lab Test 04/09/21  0916 03/19/21  1029    136   POTASSIUM 4.3 4.4   CHLORIDE 107 108   CO2 27 26   ANIONGAP 4 2*   GLC 98 88   BUN 6* 8   CR 0.70 0.68   RENZO 9.2 9.3     CBC RESULTS: Recent Labs   Lab Test 04/09/21  0916   WBC 7.9   RBC 4.62   HGB 13.1   HCT 41.7   MCV 90   MCH 28.4   MCHC 31.4*   RDW 13.2        Liver Function Studies - Recent Labs   Lab Test 03/19/21  1029   PROTTOTAL 7.6   ALBUMIN 4.0   BILITOTAL 0.3   ALKPHOS 67   AST 18   ALT 35        Lab Results   Component Value Date    TSH 1.91 04/28/2021          Allergies:    Allergies   Allergen Reactions     Metformin Diarrhea     Lamotrigine Rash       Medications:    Current Outpatient Medications   Medication Sig Dispense Refill     famotidine (PEPCID) 20 MG tablet Take 1 tablet (20 mg) by mouth At Bedtime 90 tablet 3     GAVILAX 17 GM/SCOOP powder MIX 17GM WITH 8OZ OF WATER OR OTHER BEVERAGE AND DRINK BY MOUTH ONCE DAILY 476 g 1     LACTASE ENZYME 3000 units tablet TAKE 3 TABLETS BY MOUTH 3 TIMES DAILY AS NEEDED (9000 UNITS) WITH FOOD CONTAINING LACTOSE 270 tablet 11     levonorgestrel-ethinyl estradiol (SEASONALE) 0.15-0.03 MG tablet Take 1 tablet by mouth daily 90  tablet 3     levothyroxine (SYNTHROID/LEVOTHROID) 75 MCG tablet Take 1 tablet (75 mcg) by mouth daily 90 tablet 1     lithium (ESKALITH CR/LITHOBID) 450 MG CR tablet TAKE 1 TABLET BY MOUTH THREE TIMES DAILY  2     loratadine (CLARITIN) 10 MG tablet TAKE 1 TABLET BY MOUTH DAILY 28 tablet 6     OLANZapine (ZYPREXA) 5 MG tablet TAKE 1/2 TABLET BY MOUTH ONCE DAILY AT 8AM AND TAKE 1 TABLET DAILY AT 4PM  3     OXcarbazepine (TRILEPTAL) 600 MG tablet TAKE 1 TABLET BY MOUTH 3 TIMES DAILY 84 tablet 6     SUMAtriptan (IMITREX) 50 MG tablet TAKE 1 TABLET BY MOUTH AT ONSET OF HEADACHE, MAY REPEAT IN 2 HOURS. UP TO TWICE DAILY AS NEEDED  0     traZODone (DESYREL) 150 MG tablet TAKE 1 TABLET BY MOUTH DAILY AT BEDTIME  3     venlafaxine (EFFEXOR-XR) 75 MG 24 hr capsule Take 75 mg by mouth daily  2     albuterol (PROAIR HFA/PROVENTIL HFA/VENTOLIN HFA) 108 (90 Base) MCG/ACT inhaler Inhale 2 puffs into the lungs (Patient not taking: Reported on 7/12/2021)       ALPRAZolam (XANAX) 0.5 MG tablet  (Patient not taking: Reported on 7/12/2021)         Problem List:  Patient Active Problem List    Diagnosis Date Noted     Lives in group home 09/30/2020     Priority: High     Borderline personality disorder (H) 06/07/2021     Priority: Medium     Autism spectrum disorder 09/22/2019     Priority: Medium     Moderate episode of recurrent major depressive disorder (H) 09/22/2019     Priority: Medium     YARA (generalized anxiety disorder) 09/22/2019     Priority: Medium     Mild intellectual disability 09/22/2019     Priority: Medium     Attention deficit hyperactivity disorder (ADHD), unspecified ADHD type 09/22/2019     Priority: Medium     Oppositional defiant disorder 09/22/2019     Priority: Medium     History of seizures 09/22/2019     Priority: Medium     Last seizure in her teens       Migraine without aura and without status migrainosus, not intractable 09/22/2019     Priority: Medium     Morbid obesity (H) 09/18/2019     Priority:  "Medium     Polycystic ovary syndrome 03/19/2021     Priority: Low     Peripheral vision loss, bilateral 03/19/2021     Priority: Low     Impaired physical mobility 03/19/2021     Priority: Low     Hirsutism 03/19/2021     Priority: Low     Eczema 03/19/2021     Priority: Low     Constipation, unspecified constipation type 07/09/2020     Priority: Low     Gastroesophageal reflux disease without esophagitis 07/01/2020     Priority: Low     Hypothyroidism, unspecified type 09/22/2019     Priority: Low     Injury of right optic nerve, initial encounter 09/22/2019     Priority: Low     difficult peripheral vision, 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       Lactose intolerance 09/22/2019     Priority: Low     Non-seasonal allergic rhinitis due to pollen 09/22/2019     Priority: Low        Past Medical/Surgical History:  Past Medical History:   Diagnosis Date     Anxiety and depression      Autism      Autism spectrum disorder      Dyslipidemia      Eczema      GERD (gastroesophageal reflux disease)      History of seizures     Last seizure in her teens     Hypothyroidism      Impaired physical mobility      Migraine      Morbid obesity with BMI of 60.0-69.9, adult (H)      Oppositional defiant disorder      Peripheral vision loss, bilateral      Polycystic ovary syndrome      Seasonal allergies      Past Surgical History:   Procedure Laterality Date     BRAIN SURGERY  2000    for seizures. 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\"     BRAIN SURGERY  2000    for epilepsy age 4     TOENAIL EXCISION Right     ingrowing toenail removal      WISDOM TOOTH EXTRACTION         Social History:  Social History     Socioeconomic History     Marital status: Single     Spouse name: Not on file     Number of children: Not on file     Years of education: Not on file     Highest education level: Not on file   Occupational History     Not on file   Tobacco Use     Smoking status: Never Smoker     Smokeless tobacco: " "Never Used   Substance and Sexual Activity     Alcohol use: Never     Drug use: Never     Sexual activity: Never   Other Topics Concern     Parent/sibling w/ CABG, MI or angioplasty before 65F 55M? Not Asked   Social History Narrative     Not on file     Social Determinants of Health     Financial Resource Strain:      Difficulty of Paying Living Expenses:    Food Insecurity:      Worried About Running Out of Food in the Last Year:      Ran Out of Food in the Last Year:    Transportation Needs:      Lack of Transportation (Medical):      Lack of Transportation (Non-Medical):    Physical Activity:      Days of Exercise per Week:      Minutes of Exercise per Session:    Stress:      Feeling of Stress :    Social Connections:      Frequency of Communication with Friends and Family:      Frequency of Social Gatherings with Friends and Family:      Attends Restorationist Services:      Active Member of Clubs or Organizations:      Attends Club or Organization Meetings:      Marital Status:    Intimate Partner Violence:      Fear of Current or Ex-Partner:      Emotionally Abused:      Physically Abused:      Sexually Abused:        Family History:  Family History   Problem Relation Age of Onset     Diabetes Mother      Coronary Artery Disease Mother      Obesity Mother      Breast Cancer Paternal Aunt 50     Bipolar Disorder Mother        Review of Systems:  A complete review of systems reviewed by me is not obtained due to uncertain veracity    Physical Examination:  Vitals: Ht 1.588 m (5' 2.5\")   Wt (!) 156.9 kg (346 lb)   BMI 62.28 kg/m    BMI= Body mass index is 62.28 kg/m .    Echo Total Score 7/21/2021   Total score - Echo 10       RIA Total Score: 19 (07/21/21 1300)    SpO2 Readings from Last 4 Encounters:   07/12/21 97%   03/19/21 99%   01/29/21 99%   01/11/21 98%       GENERAL APPEARANCE: alert and no distress  EYES: Eyes grossly normal to inspection  HENT: mouth without ulcers or lesions  NECK: generous " size  LUNGS: no shortness of breath , cough  NEURO: mentation intact, speech normal and cranial nerves 2-12 appear intact  PSYCH: affect normal/bright  Mallampati Class: 4     Impression/Plan:    Excessive daytime sleepiness (ESS 10), no reported snoring or apneas but minimal witness history available, sleep maintenance difficulties suspected, crowded oropharynx, large neck, morbid obesity. Patient is on several sedating medications (trazodone, claritin, olanzapine, trileptal, effexor, and infrequent xanax)  - Polysomnogram (using 4% desaturation/Medicare/ AASM 1B scoring rules) with transcutaneous C02 monitoring for possible obstructive sleep apnea, possible hypoventilation. May need later sleep times  - Staff from group home will accompany patient  - Recommend minimizing sedating medications    Sleep onset, maintenance difficulties  Difficult history. Currently on trazodone (3 years). Patient has poor sleep hygiene, delayed sleep phase syndrome, excessive daytime napping   - Set an alarm for 10 AM every day and get up, move alarm earlier by 1 hour every week until its 8 AM  - Sit on patio in sunlight for 1 hour every morning when you first get up.   - Don't get into bed until 9 hours before alarm goes off  - Don't use the tablet in the bed, only sleep there  - Limits nap to 1 hour a day (two 30 minute naps or one 1 hour nap), set an alarm  - Melatonin 1 mg, 4 hours prior to bedtime   - Consider use of lightbox (10,000 lux, full spectrum or 450-500 nanometers) for 15-20 minutes every morning.  - Consider actigraphy      She will follow up with me in approximately two weeks after her sleep study has been competed to review the results and discuss plan of care.       I spent 45 minutes with patient including counseling, and 20 minutes with chart review, and documentation     CC: Su Travis

## 2021-07-22 ENCOUNTER — VIRTUAL VISIT (OUTPATIENT)
Dept: SLEEP MEDICINE | Facility: CLINIC | Age: 26
End: 2021-07-22
Attending: PHYSICIAN ASSISTANT
Payer: MEDICAID

## 2021-07-22 DIAGNOSIS — Z78.9 LIVES IN GROUP HOME: Chronic | ICD-10-CM

## 2021-07-22 DIAGNOSIS — F51.04 CHRONIC INSOMNIA: ICD-10-CM

## 2021-07-22 DIAGNOSIS — R53.83 FATIGUE, UNSPECIFIED TYPE: ICD-10-CM

## 2021-07-22 DIAGNOSIS — E66.01 MORBID OBESITY (H): Chronic | ICD-10-CM

## 2021-07-22 DIAGNOSIS — F84.0 AUTISM SPECTRUM DISORDER: Chronic | ICD-10-CM

## 2021-07-22 DIAGNOSIS — G47.19 EXCESSIVE DAYTIME SLEEPINESS: Primary | ICD-10-CM

## 2021-07-22 PROCEDURE — 99205 OFFICE O/P NEW HI 60 MIN: CPT | Mod: 95 | Performed by: INTERNAL MEDICINE

## 2021-08-03 PROBLEM — E66.01 MORBID OBESITY (H): Status: RESOLVED | Noted: 2021-03-18 | Resolved: 2021-03-18

## 2021-08-05 ENCOUNTER — OFFICE VISIT (OUTPATIENT)
Dept: PODIATRY | Facility: CLINIC | Age: 26
End: 2021-08-05
Payer: MEDICAID

## 2021-08-05 VITALS
DIASTOLIC BLOOD PRESSURE: 84 MMHG | WEIGHT: 293 LBS | SYSTOLIC BLOOD PRESSURE: 124 MMHG | BODY MASS INDEX: 51.91 KG/M2 | HEIGHT: 63 IN

## 2021-08-05 DIAGNOSIS — L60.0 INGROWN NAIL OF GREAT TOE OF RIGHT FOOT: Primary | ICD-10-CM

## 2021-08-05 PROCEDURE — 11750 EXCISION NAIL&NAIL MATRIX: CPT | Mod: T5 | Performed by: PODIATRIST

## 2021-08-05 RX ORDER — CEPHALEXIN 500 MG/1
500 CAPSULE ORAL 4 TIMES DAILY
Qty: 40 CAPSULE | Refills: 0 | Status: SHIPPED | OUTPATIENT
Start: 2021-08-05 | End: 2022-05-06

## 2021-08-05 ASSESSMENT — PAIN SCALES - GENERAL: PAINLEVEL: SEVERE PAIN (6)

## 2021-08-05 ASSESSMENT — MIFFLIN-ST. JEOR: SCORE: 2280.64

## 2021-08-05 NOTE — PROGRESS NOTES
"Jane returns to clinic with a chief complain of a painful ingrown toenail to the right great toe.  The patient relates pain when wearing shoes.  The patient denies any redness extending up the big toe into the foot.  The patient relates the condition has been getting worse over the past several weeks.         Pertinent medical, surgical and family history was reviewed in the chart    Vitals: /84 (BP Location: Left arm)   Ht 1.588 m (5' 2.5\")   Wt (!) 156.9 kg (346 lb)   BMI 62.28 kg/m    BMI= Body mass index is 62.28 kg/m .    LOWER EXTREMITY PHYSICAL EXAM    Dermatologic: One notes an inflamed nail border of the right great toe.  There is noted erythema and edema located around the labial fold and does not extend past the interphalangeal joint.  There is no apparent purulent drainage noted.  There is pain on palpation to the proximal aspect of the nail border.  Otherwise, the skin is intact to both lower extremities without significant lesions, rash or abrasion.           Vascular: DP & PT pulses are intact & regular on the right.   CFT and skin temperature is normal to the right lower extremities.     Neurologic: Lower extremity sensation is intact to light touch.  No evidence of weakness in the right lower extremities.        Musculoskeletal: Patient is ambulatory without assistive device or brace.  No gross ankle deformity noted.  No foot or ankle joint effusion is noted.         ASSESSMENT / PLAN:     ICD-10-CM    1. Ingrown nail of great toe of right foot  L60.0        Plan:  I have explained to Jane about the condition.  The potential causes and nature of an ingrown toenail were discussed with the patient.  We reviewed the natural history and prognosis of the condition and potential risks if no treatment is provided.  Treatment options discussed included conservative management (oral antibiotics, soaking of foot, adequate width shoes)  as well as surgical management (partial or total nail " removal).  The pros and cons of both forms as well as risks and benefits of treatment were reviewed.       At this point, I recommended having the offending nail border permanently removed.  I have explained to the patient all of the possible risks, benefits, alternatives to the procedure.  The patient consented to the proposed procedure.  The right hallux was swabbed with alcohol.  Next, approximately 3 cc of 1% lidocaine plain was injected around the right hallux.  The right hallux was then prepped with Betadine ointment.  A tourniquet was applied to the right hallux.  The offending nail border was split using an English anvil back to the matrix.  Next, utilizing a straight hemostat the offending nail border was avulsed with the attached matrix.  The wound bed was debrided on any remaining nail, matrix and skin.  Next, the offending nail border was treated with 89% phenol using microtip cotton applicators for 30 seconds.  The wound was then irrigated and dressed with Triple antibiotic ointment and a compressive dry sterile dressing.  The tourniquet was removed and a prompt hyperemic response was noted.  The patient tolerated the procedure well with no complications.  The patient was given post procedure instructions for the care of the wound.  The patient was informed that it is common to experience redness with watery drainage coming from the treated areas of the toe related to the phenol application.  The patient was prescribed Keflex 500 mg p.o. 4 times daily for 10 days.  The patient was instructed to notify the office if any redness extending past the big toe joint or fever with chills are experienced.      There is low risk of morbidity with the procedure.  There was no overlap in work associated with the evaluation/management and the work associated with the procedure.    Jane verbalized agreement with and understanding of the rational for the diagnosis and treatment plan.  All questions were answered to  best of my ability and the patient's satisfaction. The patient was advised to contact the clinic with any questions that may arise after the clinic visit.      Disclaimer: This note consists of symbols derived from keyboarding, dictation and/or voice recognition software. As a result, there may be errors in the script that have gone undetected. Please consider this when interpreting information found in this chart.      OLIVER Brice D.P.M., F.MARGIE.C.[email or web address].S.

## 2021-08-05 NOTE — LETTER
"    8/5/2021         RE: Jane Lee  [id number] [facility] [id number]        Dear Colleague,    Thank you for referring your patient, Jane Lee, to the [facility] [place]. Please see a copy of my visit note below.    Jane returns to clinic with a chief complain of a painful ingrown toenail to the right great toe.  The patient relates pain when wearing shoes.  The patient denies any redness extending up the big toe into the foot.  The patient relates the condition has been getting worse over the past several weeks.         Pertinent medical, surgical and family history was reviewed in the chart    Vitals: /84 (BP Location: Left arm)   Ht 1.588 m (5' 2.5\")   Wt (!) 156.9 kg (346 lb)   BMI 62.28 kg/m    BMI= Body mass index is 62.28 kg/m .    LOWER EXTREMITY PHYSICAL EXAM    Dermatologic: One notes an inflamed nail border of the right great toe.  There is noted erythema and edema located around the labial fold and does not extend past the interphalangeal joint.  There is no apparent purulent drainage noted.  There is pain on palpation to the proximal aspect of the nail border.  Otherwise, the skin is intact to both lower extremities without significant lesions, rash or abrasion.           Vascular: DP & PT pulses are intact & regular on the right.   CFT and skin temperature is normal to the right lower extremities.     Neurologic: Lower extremity sensation is intact to light touch.  No evidence of weakness in the right lower extremities.        Musculoskeletal: Patient is ambulatory without assistive device or brace.  No gross ankle deformity noted.  No foot or ankle joint effusion is noted.         ASSESSMENT / PLAN:     ICD-10-CM    1. Ingrown nail of great toe of right foot  L60.0        Plan:  I have explained to Jane about the condition.  The potential causes and nature of an ingrown toenail were discussed with the patient.  We reviewed the natural history and " prognosis of the condition and potential risks if no treatment is provided.  Treatment options discussed included conservative management (oral antibiotics, soaking of foot, adequate width shoes)  as well as surgical management (partial or total nail removal).  The pros and cons of both forms as well as risks and benefits of treatment were reviewed.       At this point, I recommended having the offending nail border permanently removed.  I have explained to the patient all of the possible risks, benefits, alternatives to the procedure.  The patient consented to the proposed procedure.  The right hallux was swabbed with alcohol.  Next, approximately 3 cc of 1% lidocaine plain was injected around the right hallux.  The right hallux was then prepped with Betadine ointment.  A tourniquet was applied to the right hallux.  The offending nail border was split using an English anvil back to the matrix.  Next, utilizing a straight hemostat the offending nail border was avulsed with the attached matrix.  The wound bed was debrided on any remaining nail, matrix and skin.  Next, the offending nail border was treated with 89% phenol using microtip cotton applicators for 30 seconds.  The wound was then irrigated and dressed with Triple antibiotic ointment and a compressive dry sterile dressing.  The tourniquet was removed and a prompt hyperemic response was noted.  The patient tolerated the procedure well with no complications.  The patient was given post procedure instructions for the care of the wound.  The patient was informed that it is common to experience redness with watery drainage coming from the treated areas of the toe related to the phenol application.  The patient was prescribed Keflex 500 mg p.o. 4 times daily for 10 days.  The patient was instructed to notify the office if any redness extending past the big toe joint or fever with chills are experienced.      There is low risk of morbidity with the procedure.   There was no overlap in work associated with the evaluation/management and the work associated with the procedure.    Jane verbalized agreement with and understanding of the rational for the diagnosis and treatment plan.  All questions were answered to best of my ability and the patient's satisfaction. The patient was advised to contact the clinic with any questions that may arise after the clinic visit.      Disclaimer: This note consists of symbols derived from keyboarding, dictation and/or voice recognition software. As a result, there may be errors in the script that have gone undetected. Please consider this when interpreting information found in this chart.      OLIVER Brice D.P.M., F.A.C.F.A.S.        Again, thank you for allowing me to participate in the care of your patient.        Sincerely,        Palmer Brice DPM

## 2021-08-11 ENCOUNTER — TRANSFERRED RECORDS (OUTPATIENT)
Dept: HEALTH INFORMATION MANAGEMENT | Facility: CLINIC | Age: 26
End: 2021-08-11

## 2021-08-18 ENCOUNTER — VIRTUAL VISIT (OUTPATIENT)
Dept: FAMILY MEDICINE | Facility: CLINIC | Age: 26
End: 2021-08-18
Payer: MEDICAID

## 2021-08-18 DIAGNOSIS — E66.813 CLASS 3 SEVERE OBESITY DUE TO EXCESS CALORIES WITHOUT SERIOUS COMORBIDITY WITH BODY MASS INDEX (BMI) OF 60.0 TO 69.9 IN ADULT (H): Primary | ICD-10-CM

## 2021-08-18 DIAGNOSIS — K21.9 GASTROESOPHAGEAL REFLUX DISEASE WITHOUT ESOPHAGITIS: ICD-10-CM

## 2021-08-18 DIAGNOSIS — Z71.3 NUTRITIONAL COUNSELING: ICD-10-CM

## 2021-08-18 DIAGNOSIS — E66.01 CLASS 3 SEVERE OBESITY DUE TO EXCESS CALORIES WITHOUT SERIOUS COMORBIDITY WITH BODY MASS INDEX (BMI) OF 60.0 TO 69.9 IN ADULT (H): Primary | ICD-10-CM

## 2021-08-18 PROCEDURE — 97803 MED NUTRITION INDIV SUBSEQ: CPT | Mod: 95 | Performed by: DIETITIAN, REGISTERED

## 2021-08-18 PROCEDURE — 99207 PR DROP WITH A PROCEDURE: CPT | Mod: 25 | Performed by: DIETITIAN, REGISTERED

## 2021-08-18 NOTE — PROGRESS NOTES
Jane Lee is a 26 year old who is being evaluated via a billable video visit.      How would you like to obtain your AVS? MyChart  If the video visit is dropped, the invitation should be resent by: Text to cell phone: [phone]  Will anyone else be joining your video visit? No      Video Start Time: 1:02 pm       Medical  Weight Loss Follow-Up Diet Evaluation  Assessment:  Jane is presenting today for a follow up weight management nutrition consultation. Pt has had an initial appointment with Dr. Marcano   Weight loss medication: None .   Current Weight: 346.9 lbs (last month's weight, hasn't weighed herself since last visit, however feels she is down some)   No flowsheet data found.  BMI: There is no height or weight on file to calculate BMI.  Ideal body weight: 51.3 kg (112 lb 15.8 oz)  Adjusted ideal body weight: 93.5 kg (206 lb 3.1 oz)    Estimated RMR ([place]-St Jeor equation):   2287 kcals x 1.3 (light active) = 2973 kcals (for weight maintenance)     Recommended Protein Intake: 60-80 grams of protein/day  Patient Active Problem List:  Patient Active Problem List   Diagnosis     Morbid obesity (H)     Autism spectrum disorder     Moderate episode of recurrent major depressive disorder (H)     Hypothyroidism, unspecified type     YARA (generalized anxiety disorder)     Mild intellectual disability     Attention deficit hyperactivity disorder (ADHD), unspecified ADHD type     Oppositional defiant disorder     History of seizures     Lactose intolerance     Migraine without aura and without status migrainosus, not intractable     Non-seasonal allergic rhinitis due to pollen     Gastroesophageal reflux disease without esophagitis     Constipation, unspecified constipation type     Lives in group home     Polycystic ovary syndrome     Peripheral vision loss, bilateral     Impaired physical mobility     Hirsutism     Eczema     Borderline personality disorder (H)     Diabetes: No     Progress on goals from  last visit: Patient reports that she has been working on portion sizes at meals.  Care worker noted that they have switched to a lighter lunch now more recently as well.  Patient reports that she is eating more vegetables these days.  Patient reports that she has also been exercising more since the last visit.     Dietary Recall:  Breakfast: pancakes or eggs or omelette or Ghanaian toast or egg bake   Lunch:sandwich, fruit, vegetable or leftovers from the night before   Dinner:Kielbasa or Roast Beef or Pulled Chicken [place] or Chinese Meal or Pizza Burger with veggies or occasional fruit, rice or potato   Typical snacks: on occasion fruit snacks or granola bar or orange or bananas or Smoothie  Beverages: Water (carries a water bottle to help encourage increased intake), Crystal Light, Coffee with creamer (portioned out)   Exercise: Walking daily for 60 minutes, Strength Training 2 times this past week at the [facility]    Nutrition Diagnosis:    Overweight/Obesity (NC 3.3) related to overeating and poor lifestyle habits as evidenced by patient's subjective statements (excessive energy intake, lack of exercise) and BMI of 62.32 kg/m2.     Intervention:  1. Food and/or nutrient delivery: balanced meals, adequate protein   2. Nutrition education: importance of increased exercise  3. Nutrition counseling: exercise regimen, praised patient for positive changes made.     Monitoring/Evaluation:    Goals:  1. Continue to work on increased exercise, focusing on at least 45-60 minutes daily.   2. Continue to be mindful regarding portion sizes, avoiding going back for seconds unless it is the vegetable.     Patient to follow up in 1 month(s) with RD    Video-Visit Details    Type of service:  Video Visit    Video End Time:1:18 PM    Originating Location (pt. Location): Home    Distant Location (provider location):  [facility] SURGERY [facility] AND BARIATRICS CARE [place]     Platform used for Video Visit: Xin LAINEZ  Lausted, RD

## 2021-09-22 ENCOUNTER — VIRTUAL VISIT (OUTPATIENT)
Dept: SURGERY | Facility: CLINIC | Age: 26
End: 2021-09-22
Payer: MEDICAID

## 2021-09-22 DIAGNOSIS — E66.01 MORBID OBESITY WITH BMI OF 60.0-69.9, ADULT (H): Primary | ICD-10-CM

## 2021-09-22 DIAGNOSIS — E78.5 DYSLIPIDEMIA: ICD-10-CM

## 2021-09-22 DIAGNOSIS — K21.9 GASTROESOPHAGEAL REFLUX DISEASE WITHOUT ESOPHAGITIS: ICD-10-CM

## 2021-09-22 DIAGNOSIS — Z71.3 NUTRITIONAL COUNSELING: ICD-10-CM

## 2021-09-22 PROCEDURE — 97803 MED NUTRITION INDIV SUBSEQ: CPT | Mod: 95 | Performed by: DIETITIAN, REGISTERED

## 2021-09-22 NOTE — LETTER
9/22/2021         RE: Jane Lee  [id number] [id number]        Dear Colleague,    Thank you for referring your patient, Jane Lee, to the [facility] AND BARIATRICS CARE [place]. Please see a copy of my visit note below.    Jane Lee is a 26 year old who is being evaluated via a billable video visit.      How would you like to obtain your AVS? MyChart  If the video visit is dropped, the invitation should be resent by: Text to cell phone: [phone]  Will anyone else be joining your video visit? No      Video Start Time: 1:11 PM      Medical  Weight Loss Follow-Up Diet Evaluation  Assessment:  Jane is presenting today for a follow up weight management nutrition consultation. Pt has had an initial appointment with Dr. Marcano  Weight loss medication: None .   Current Weight: 346 lbs (last documented weight as of 8/5/2021, does not have a scale that goes above 300 lbs at home)  No flowsheet data found.  BMI: There is no height or weight on file to calculate BMI.  Ideal body weight: 51.3 kg (112 lb 15.8 oz)  Adjusted ideal body weight: 93.5 kg (206 lb 3.1 oz)    Estimated RMR ([place]-St Jeor equation):   2287 kcals x 1.3 (light active) = 2973 kcals (for weight maintenance)     Recommended Protein Intake: 60-80 grams of protein/day  Patient Active Problem List:  Patient Active Problem List   Diagnosis     Morbid obesity (H)     Autism spectrum disorder     Moderate episode of recurrent major depressive disorder (H)     Hypothyroidism, unspecified type     YARA (generalized anxiety disorder)     Mild intellectual disability     Attention deficit hyperactivity disorder (ADHD), unspecified ADHD type     Oppositional defiant disorder     History of seizures     Lactose intolerance     Migraine without aura and without status migrainosus, not intractable     Non-seasonal allergic rhinitis due to pollen     Gastroesophageal reflux disease without esophagitis      Constipation, unspecified constipation type     Lives in group home     Polycystic ovary syndrome     Peripheral vision loss, bilateral     Impaired physical mobility     Hirsutism     Eczema     Borderline personality disorder (H)     Diabetes: No     Progress on goals from last visit: has been working on portion sizes at meals.      Dietary Recall:  Breakfast: breakfast burrito or egg sandwich with sausage and cat, fruit   Lunch:leftovers from the night before or sandwich (ham or turkey), fruit   Dinner:cooked-meat, starch, vegetables or fruit (trying to stick with adequate portion sizes)   Typical snacks: 1 cupcake/day or fruit or vegetable   Beverages: Chocolate Milk or Strawberry Lemonade Sunkist (on a daily basis), Crystal Light, Sparkling Water   Exercise: Walking daily for at least 60 minutes, [employer]  (strength training/weights) 2-3times/week    Nutrition Diagnosis:    Overweight/Obesity (NC 3.3) related to overeating and poor lifestyle habits as evidenced by patient's subjective statements (excessive energy intake) and BMI of 62.3 kg/m2.       Intervention:  1. Food and/or nutrient delivery: balance meals, adequate protein   2. Nutrition education: calorie containing beverages   3. Nutrition counseling: exercise, praised patient for positive changes made    Monitoring/Evaluation:    Goals:  1. Work on reduction of calorie containing beverages  (I.e. chocolate milk, pop), switch to a smaller version to start out with.   2. Continue to work on portion control at meals (avoidance of second helpings).   3. Continue to exercise daily (either cardio and/or strengthening).       Patient to follow up in 1 month(s) with RD      Video-Visit Details    Type of service:  Video Visit    Video End Time:1:27 PM    Originating Location (pt. Location): Home    Distant Location (provider location):  [facility] SURGERY [facility] AND BARIATRICS CARE [place]     Platform used for Video Visit: Xin LAINEZ  MARCELINA Saldaña        Again, thank you for allowing me to participate in the care of your patient.        Sincerely,        Shima Saldaña RD

## 2021-09-22 NOTE — PROGRESS NOTES
Jane Lee is a 26 year old who is being evaluated via a billable video visit.      How would you like to obtain your AVS? MyChart  If the video visit is dropped, the invitation should be resent by: Text to cell phone: [phone]  Will anyone else be joining your video visit? No      Video Start Time: 1:11 PM      Medical  Weight Loss Follow-Up Diet Evaluation  Assessment:  Jane is presenting today for a follow up weight management nutrition consultation. Pt has had an initial appointment with Dr. Marcano  Weight loss medication: None .   Current Weight: 346 lbs (last documented weight as of 8/5/2021, does not have a scale that goes above 300 lbs at home)  No flowsheet data found.  BMI: There is no height or weight on file to calculate BMI.  Ideal body weight: 51.3 kg (112 lb 15.8 oz)  Adjusted ideal body weight: 93.5 kg (206 lb 3.1 oz)    Estimated RMR ([place]-St Jeor equation):   2287 kcals x 1.3 (light active) = 2973 kcals (for weight maintenance)     Recommended Protein Intake: 60-80 grams of protein/day  Patient Active Problem List:  Patient Active Problem List   Diagnosis     Morbid obesity (H)     Autism spectrum disorder     Moderate episode of recurrent major depressive disorder (H)     Hypothyroidism, unspecified type     YARA (generalized anxiety disorder)     Mild intellectual disability     Attention deficit hyperactivity disorder (ADHD), unspecified ADHD type     Oppositional defiant disorder     History of seizures     Lactose intolerance     Migraine without aura and without status migrainosus, not intractable     Non-seasonal allergic rhinitis due to pollen     Gastroesophageal reflux disease without esophagitis     Constipation, unspecified constipation type     Lives in group home     Polycystic ovary syndrome     Peripheral vision loss, bilateral     Impaired physical mobility     Hirsutism     Eczema     Borderline personality disorder (H)     Diabetes: No     Progress on goals from last  visit: has been working on portion sizes at meals.      Dietary Recall:  Breakfast: breakfast burrito or egg sandwich with sausage and cat, fruit   Lunch:leftovers from the night before or sandwich (ham or turkey), fruit   Dinner:cooked-meat, starch, vegetables or fruit (trying to stick with adequate portion sizes)   Typical snacks: 1 cupcake/day or fruit or vegetable   Beverages: Chocolate Milk or Strawberry Lemonade Sunkist (on a daily basis), Crystal Light, Sparkling Water   Exercise: Walking daily for at least 60 minutes, [employer]  (strength training/weights) 2-3times/week    Nutrition Diagnosis:    Overweight/Obesity (NC 3.3) related to overeating and poor lifestyle habits as evidenced by patient's subjective statements (excessive energy intake) and BMI of 62.3 kg/m2.       Intervention:  1. Food and/or nutrient delivery: balance meals, adequate protein   2. Nutrition education: calorie containing beverages   3. Nutrition counseling: exercise, praised patient for positive changes made    Monitoring/Evaluation:    Goals:  1. Work on reduction of calorie containing beverages  (I.e. chocolate milk, pop), switch to a smaller version to start out with.   2. Continue to work on portion control at meals (avoidance of second helpings).   3. Continue to exercise daily (either cardio and/or strengthening).       Patient to follow up in 1 month(s) with RD      Video-Visit Details    Type of service:  Video Visit    Video End Time:1:27 PM    Originating Location (pt. Location): Home    Distant Location (provider location):  [facility] SURG[facility]ATRICS C[facility] [place]     Platform used for Video Visit: Xin Saldaña RD

## 2021-10-05 ENCOUNTER — VIRTUAL VISIT (OUTPATIENT)
Dept: URGENT CARE | Facility: CLINIC | Age: 26
End: 2021-10-05
Payer: MEDICAID

## 2021-10-05 DIAGNOSIS — R50.9 FEVER AND CHILLS: Primary | ICD-10-CM

## 2021-10-05 PROCEDURE — 99441 PR PHYSICIAN TELEPHONE EVALUATION 5-10 MIN: CPT | Performed by: EMERGENCY MEDICINE

## 2021-10-05 NOTE — PROGRESS NOTES
Phone appointment for history:     Assessment: Onset of fever last evening now has defervesced.  Feeling constitutionally mildly ill.  Lives in a group home and staff requesting Covid testing.      Plan: Covid PCR ordered.  Testing team to follow.    CHIEF COMPLAINT: Fever, malaise      HPI: Patient is a 26-year-old female who apparently developed a fever last evening with a T-max of 103.  Staff requesting testing for Covid although I feel she is generally improved.      ROS: See HPI otherwise normal.    Allergies   Allergen Reactions     Metformin Diarrhea     Lamotrigine Rash      Current Outpatient Medications   Medication Sig Dispense Refill     albuterol (PROAIR HFA/PROVENTIL HFA/VENTOLIN HFA) 108 (90 Base) MCG/ACT inhaler Inhale 2 puffs into the lungs (Patient not taking: Reported on 7/12/2021)       ALPRAZolam (XANAX) 0.5 MG tablet  (Patient not taking: Reported on 7/12/2021)       cephALEXin (KEFLEX) 500 MG capsule Take 1 capsule (500 mg) by mouth 4 times daily 40 capsule 0     famotidine (PEPCID) 20 MG tablet Take 1 tablet (20 mg) by mouth At Bedtime 90 tablet 3     GAVILAX 17 GM/SCOOP powder MIX 17GM WITH 8OZ OF WATER OR OTHER BEVERAGE AND DRINK BY MOUTH ONCE DAILY 476 g 1     LACTASE ENZYME 3000 units tablet TAKE 3 TABLETS BY MOUTH 3 TIMES DAILY AS NEEDED (9000 UNITS) WITH FOOD CONTAINING LACTOSE 270 tablet 11     levonorgestrel-ethinyl estradiol (SEASONALE) 0.15-0.03 MG tablet Take 1 tablet by mouth daily 90 tablet 3     levothyroxine (SYNTHROID/LEVOTHROID) 75 MCG tablet Take 1 tablet (75 mcg) by mouth daily 90 tablet 1     lithium (ESKALITH CR/LITHOBID) 450 MG CR tablet TAKE 1 TABLET BY MOUTH THREE TIMES DAILY  2     loratadine (CLARITIN) 10 MG tablet TAKE 1 TABLET BY MOUTH DAILY 28 tablet 6     melatonin 1 MG TABS tablet Take 1 tablet (1 mg) by mouth daily Take 4 hours prior to bedtime 30 tablet 4     OLANZapine (ZYPREXA) 5 MG tablet TAKE 1/2 TABLET BY MOUTH ONCE DAILY AT 8AM AND TAKE 1 TABLET DAILY AT  4PM  3     OXcarbazepine (TRILEPTAL) 600 MG tablet TAKE 1 TABLET BY MOUTH 3 TIMES DAILY 84 tablet 6     SUMAtriptan (IMITREX) 50 MG tablet TAKE 1 TABLET BY MOUTH AT ONSET OF HEADACHE, MAY REPEAT IN 2 HOURS. UP TO TWICE DAILY AS NEEDED  0     traZODone (DESYREL) 150 MG tablet TAKE 1 TABLET BY MOUTH DAILY AT BEDTIME  3     venlafaxine (EFFEXOR-XR) 75 MG 24 hr capsule Take 75 mg by mouth daily  2         PE: Deferred.  No acute distress according to staff.        TREATMENT: None.      ASSESSMENT: Fever and chills, now defervesced.  Rule out Covid.      DIAGNOSIS: Fever and chills      PLAN: Covid PCR ordered.  Testing team to follow.  Patient to be seen by medical care if any symptoms of worsening symptoms or concerns.    Time: 10 minutes

## 2021-10-06 ENCOUNTER — LAB (OUTPATIENT)
Dept: FAMILY MEDICINE | Facility: CLINIC | Age: 26
End: 2021-10-06
Attending: EMERGENCY MEDICINE
Payer: MEDICAID

## 2021-10-06 DIAGNOSIS — R50.9 FEVER AND CHILLS: ICD-10-CM

## 2021-10-06 LAB — SARS-COV-2 RNA RESP QL NAA+PROBE: NEGATIVE

## 2021-10-06 PROCEDURE — U0003 INFECTIOUS AGENT DETECTION BY NUCLEIC ACID (DNA OR RNA); SEVERE ACUTE RESPIRATORY SYNDROME CORONAVIRUS 2 (SARS-COV-2) (CORONAVIRUS DISEASE [COVID-19]), AMPLIFIED PROBE TECHNIQUE, MAKING USE OF HIGH THROUGHPUT TECHNOLOGIES AS DESCRIBED BY CMS-2020-01-R: HCPCS

## 2021-10-06 PROCEDURE — U0005 INFEC AGEN DETEC AMPLI PROBE: HCPCS

## 2021-10-11 ENCOUNTER — TELEPHONE (OUTPATIENT)
Dept: SLEEP MEDICINE | Facility: CLINIC | Age: 26
End: 2021-10-11

## 2021-10-11 PROCEDURE — 99284 EMERGENCY DEPT VISIT MOD MDM: CPT | Performed by: FAMILY MEDICINE

## 2021-10-11 PROCEDURE — 99284 EMERGENCY DEPT VISIT MOD MDM: CPT | Mod: 25 | Performed by: FAMILY MEDICINE

## 2021-10-11 ASSESSMENT — MIFFLIN-ST. JEOR: SCORE: 2367.95

## 2021-10-11 NOTE — TELEPHONE ENCOUNTER
Reason for call:  Other   Patient called regarding (reason for call): call back  Additional comments: Darleen called to cancel sleep study as patient is going to the hospital for an infection on her calf. Tried calling the  sleep center to [facility] [phone] and it routed back to scheduling. Left the info in appt notes as well. Please call to reschedule    Phone number to reach patient:  Other phone number:  [phone]    Best Time:  Anytime    Can we leave a detailed message on this number?  YES    Travel screening: Not Applicable

## 2021-10-12 ENCOUNTER — APPOINTMENT (OUTPATIENT)
Dept: ULTRASOUND IMAGING | Facility: CLINIC | Age: 26
End: 2021-10-12
Attending: FAMILY MEDICINE
Payer: MEDICAID

## 2021-10-12 ENCOUNTER — HOSPITAL ENCOUNTER (EMERGENCY)
Facility: CLINIC | Age: 26
Discharge: HOME | End: 2021-10-12
Attending: FAMILY MEDICINE | Admitting: FAMILY MEDICINE
Payer: MEDICAID

## 2021-10-12 VITALS
BODY MASS INDEX: 50.02 KG/M2 | TEMPERATURE: 98.3 F | SYSTOLIC BLOOD PRESSURE: 147 MMHG | OXYGEN SATURATION: 98 % | WEIGHT: 293 LBS | RESPIRATION RATE: 20 BRPM | DIASTOLIC BLOOD PRESSURE: 96 MMHG | HEIGHT: 64 IN | HEART RATE: 60 BPM

## 2021-10-12 DIAGNOSIS — M79.662 PAIN OF LEFT LOWER LEG: ICD-10-CM

## 2021-10-12 PROCEDURE — 93971 EXTREMITY STUDY: CPT | Mod: LT

## 2021-10-12 PROCEDURE — 250N000013 HC RX MED GY IP 250 OP 250 PS 637: Performed by: FAMILY MEDICINE

## 2021-10-12 RX ORDER — CEPHALEXIN 500 MG/1
500 CAPSULE ORAL 4 TIMES DAILY
Qty: 40 CAPSULE | Refills: 0 | Status: SHIPPED | OUTPATIENT
Start: 2021-10-12 | End: 2021-10-22

## 2021-10-12 RX ORDER — CEPHALEXIN 500 MG/1
1000 CAPSULE ORAL ONCE
Status: COMPLETED | OUTPATIENT
Start: 2021-10-12 | End: 2021-10-12

## 2021-10-12 RX ADMIN — CEPHALEXIN 1000 MG: 500 CAPSULE ORAL at 04:11

## 2021-10-12 NOTE — DISCHARGE INSTRUCTIONS
Keflex 500 mg p.o. 4 times daily x10 days.  Return to the emergency department if worse or changes.  Ultrasound showed no evidence of DVT (blood clot)

## 2021-10-12 NOTE — ED PROVIDER NOTES
History     Chief Complaint   Patient presents with     Leg Pain     leg pain with swelling. warm to touch. hx of cellulitis.      HPI  Jane Lee is a 26 year old female, past medical history is significant for borderline personality disorder, polycystic ovarian syndrome, impaired physical mobility, hirsutism, constipation, GERD, autism spectrum disorder, depression, hypothyroidism, generalized anxiety disorder, mild intellectual disability, ADHD, oppositional defiant disorder, history of seizures, lactose intolerance, migraine, nonseasonal allergic rhinitis, morbid obesity, presents to the emergency department with concerns of left leg pain and swelling x1.5 weeks who presents from group home with caregiver.  1.5 weeks of feeling swollen, tender mainly in the calf area, the patient noted a red spot earlier but that seems to have disappeared.  She has had cellulitis before and is very concerned that this represents cellulitis.      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       Impaired physical mobility 03/19/2021     Priority: Medium     Hirsutism 03/19/2021     Priority: Medium     Eczema 03/19/2021     Priority: Medium     Lives in group home 09/30/2020     Priority: Medium     Constipation, unspecified constipation type 07/09/2020     Priority: Medium     Gastroesophageal reflux disease without esophagitis 07/01/2020     Priority: Medium     Autism spectrum disorder 09/22/2019     Priority: Medium     Moderate episode of recurrent major depressive disorder (H) 09/22/2019     Priority: Medium     Hypothyroidism,  "unspecified type 09/22/2019     Priority: Medium     YARA (generalized anxiety disorder) 09/22/2019     Priority: Medium     Mild intellectual disability 09/22/2019     Priority: Medium     Attention deficit hyperactivity disorder (ADHD), unspecified ADHD type 09/22/2019     Priority: Medium     Oppositional defiant disorder 09/22/2019     Priority: Medium     History of seizures 09/22/2019     Priority: Medium     Last seizure in her teens       Lactose intolerance 09/22/2019     Priority: Medium     Migraine without aura and without status migrainosus, not intractable 09/22/2019     Priority: Medium     Non-seasonal allergic rhinitis due to pollen 09/22/2019     Priority: Medium     Morbid obesity (H) 09/18/2019     Priority: Medium        Past Medical History:    Past Medical History:   Diagnosis Date     Anxiety and depression      Autism      Autism spectrum disorder      Dyslipidemia      Eczema      GERD (gastroesophageal reflux disease)      History of seizures      Hypothyroidism      Impaired physical mobility      Migraine      Morbid obesity with BMI of 60.0-69.9, adult (H)      Oppositional defiant disorder      Peripheral vision loss, bilateral      Polycystic ovary syndrome      Seasonal allergies        Past Surgical History:    Past Surgical History:   Procedure Laterality Date     BRAIN SURGERY  2000    for seizures. 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\"     BRAIN SURGERY  2000    for epilepsy age 4     TOENAIL EXCISION Right     ingrowing toenail removal      WISDOM TOOTH EXTRACTION         Family History:    Family History   Problem Relation Age of Onset     Diabetes Mother      Coronary Artery Disease Mother      Obesity Mother      Breast Cancer Paternal Aunt 50     Bipolar Disorder Mother        Social History:  Marital Status:  Single [1]  Social History     Tobacco Use     Smoking status: Never Smoker     Smokeless tobacco: Never Used   Substance Use Topics     Alcohol " "use: Never     Drug use: Never        Medications:    cephALEXin (KEFLEX) 500 MG capsule  albuterol (PROAIR HFA/PROVENTIL HFA/VENTOLIN HFA) 108 (90 Base) MCG/ACT inhaler  ALPRAZolam (XANAX) 0.5 MG tablet  cephALEXin (KEFLEX) 500 MG capsule  famotidine (PEPCID) 20 MG tablet  GAVILAX 17 GM/SCOOP powder  LACTASE ENZYME 3000 units tablet  levonorgestrel-ethinyl estradiol (SEASONALE) 0.15-0.03 MG tablet  levothyroxine (SYNTHROID/LEVOTHROID) 75 MCG tablet  lithium (ESKALITH CR/LITHOBID) 450 MG CR tablet  loratadine (CLARITIN) 10 MG tablet  melatonin 1 MG TABS tablet  OLANZapine (ZYPREXA) 5 MG tablet  OXcarbazepine (TRILEPTAL) 600 MG tablet  SUMAtriptan (IMITREX) 50 MG tablet  traZODone (DESYREL) 150 MG tablet  venlafaxine (EFFEXOR-XR) 75 MG 24 hr capsule          Review of Systems   All other systems reviewed and are negative.      Physical Exam   BP: 131/89  Pulse: 71  Temp: 98.4  F (36.9  C)  Resp: 20  Height: 162.6 cm (5' 4\")  Weight: (!) 163.3 kg (360 lb)  SpO2: 98 %      Physical Exam  Vitals and nursing note reviewed.   Constitutional:       General: She is not in acute distress.     Appearance: Normal appearance. She is obese. She is not ill-appearing.   Musculoskeletal:      Comments: There is no gross appreciable asymmetry for the lower extremities, morbidly obese.  Tender left calf, positive Homans' sign.  I do not appreciate any warmth or erythema.   Neurological:      Mental Status: She is alert.         ED Course        Procedures          EXAM: US LOWER EXTREMITY VENOUS DUPLEX LEFT  LOCATION: [facility]  DATE/TIME: 10/12/2021 2:38 AM     INDICATION: Left leg pain and swelling.  COMPARISON: None.  TECHNIQUE: Venous Duplex ultrasound of the left lower extremity with and without compression, augmentation and duplex. Color flow and spectral Doppler with waveform analysis performed.     FINDINGS: Exam includes the common femoral, femoral, popliteal, and contralateral common femoral " veins as well as segmentally visualized deep calf veins and greater saphenous vein.      LEFT: No deep vein thrombosis. No superficial thrombophlebitis. No popliteal cyst.                                                                      IMPRESSION:  1.  No deep venous thrombosis in the left lower extremity.    Critical Care time:  none               No results found for this or any previous visit (from the past 24 hour(s)).    Medications   cephALEXin (KEFLEX) capsule 1,000 mg (1,000 mg Oral Given 10/12/21 1453)   Negative ultrasound for DVT.  Discussed in the room with them.  Given that she has had cellulitis before and it feels similar to her even though there are no readily apparent signs on exam of cellulitis given her comorbidities especially her obesity I would err on the side of caution and treat her empirically with appropriate antibiotic for possible early cellulitis.  The patient very much wants this.  I do not think it is unreasonable given her previous experience.  She was placed on oral Keflex and disposition is to home.  She will return if worse or changes or additional concerns.    Assessments & Plan (with Medical Decision Making)   Assessments and plan with medical decision making at the time stamp above.    Disclaimer: This note consists of symbols derived from keyboarding, dictation and/or voice recognition software. As a result, there may be errors in the script that have gone undetected. Please consider this when interpreting information found in this chart.      I have reviewed the nursing notes.    I have reviewed the findings, diagnosis, plan and need for follow up with the patient.       Discharge Medication List as of 10/12/2021  4:02 AM          Final diagnoses:   Pain of left lower leg - Negative for DVT, possible early cellulitis       10/11/2021   [facility] EMERGENCY DEPT     Cliff Medeiros MD  10/15/21 [id number]

## 2021-10-18 NOTE — TELEPHONE ENCOUNTER
PSG and follow up rescheduled.        Kimberly Vitale [facility] [place]

## 2021-10-27 ENCOUNTER — VIRTUAL VISIT (OUTPATIENT)
Dept: SURGERY | Facility: CLINIC | Age: 26
End: 2021-10-27
Payer: MEDICAID

## 2021-10-27 DIAGNOSIS — K21.9 GASTROESOPHAGEAL REFLUX DISEASE WITHOUT ESOPHAGITIS: Primary | ICD-10-CM

## 2021-10-27 DIAGNOSIS — E78.5 DYSLIPIDEMIA: ICD-10-CM

## 2021-10-27 DIAGNOSIS — E66.01 MORBID OBESITY WITH BMI OF 60.0-69.9, ADULT (H): ICD-10-CM

## 2021-10-27 DIAGNOSIS — Z71.3 NUTRITIONAL COUNSELING: ICD-10-CM

## 2021-10-27 PROCEDURE — 97803 MED NUTRITION INDIV SUBSEQ: CPT | Mod: 95 | Performed by: DIETITIAN, REGISTERED

## 2021-10-27 NOTE — LETTER
10/27/2021         RE: Jane Lee  [id number] [id number]        Dear Colleague,    Thank you for referring your patient, Jane Lee, to the [facility] SURGERY CLIN[facility] AND BARIATRICS CARE [place]. Please see a copy of my visit note below.    Jane Lee is a 26 year old who is being evaluated via a billable video visit.      How would you like to obtain your AVS? MyChart  If the video visit is dropped, the invitation should be resent by: Text to cell phone: [phone]  Will anyone else be joining your video visit? No      Video Start Time: 1:05 pm      Medical  Weight Loss Follow-Up Diet Evaluation  Assessment:  Jane is presenting today for a follow up weight management nutrition consultation. Pt has had an initial appointment with Dr. Marcano.   Weight loss medication: None .   Pt's weight is 360 lbs    No flowsheet data found.  BMI: There is no height or weight on file to calculate BMI.  Ideal body weight: 54.7 kg (120 lb 9.5 oz)  Adjusted ideal body weight: 98.1 kg (216 lb 5.7 oz)    Estimated RMR ([place]-St Jeor equation):   2370 kcals x 1.3 (light active) = 3081 kcals (for weight maintenance)     Recommended Protein Intake: 60-80 grams of protein/day  Patient Active Problem List:  Patient Active Problem List   Diagnosis     Morbid obesity (H)     Autism spectrum disorder     Moderate episode of recurrent major depressive disorder (H)     Hypothyroidism, unspecified type     YARA (generalized anxiety disorder)     Mild intellectual disability     Attention deficit hyperactivity disorder (ADHD), unspecified ADHD type     Oppositional defiant disorder     History of seizures     Lactose intolerance     Migraine without aura and without status migrainosus, not intractable     Non-seasonal allergic rhinitis due to pollen     Gastroesophageal reflux disease without esophagitis     Constipation, unspecified constipation type     Lives in group home     Polycystic ovary  syndrome     Peripheral vision loss, bilateral     Impaired physical mobility     Hirsutism     Eczema     Borderline personality disorder (H)     Diabetes: No     Progress on goals from last visit: Patient reports that she has been working on smaller portion sizes of calorie containing beverages (trying to limit to 1 cup/day of pop).  Patient reports that she is trying to focus on lower calorie/sugar free options (such as Crystal Light).  Patient reports that she is still purchasing 2 cartons of Chocolate Milk/month.  Patient reports that she continues to work on portion control, noting that she is trying to avoid second helpings.   Continues to eat her protein first at meals.      Exercise: limited due to cellulitis, so walking has been down more recently.  Did walk today for an hour, noted that the cellulitis is getting better.       Nutrition Diagnosis:    Overweight/Obesity (NC 3.3) related to overeating and poor lifestyle habits as evidenced by patient's subjective statements (excessive energy intake) and BMI of 61.79 kg/m2.     Intervention:  1. Food and/or nutrient delivery: balanced meals, adequate protein   2. Nutrition education: calorie containing beverages, portion sizes   3. Nutrition counseling: exercise regimen     Monitoring/Evaluation:    Goals:  1. Continue to work on portion control at meals, avoid going back for seconds and try eating slowly (20 minutes/meal).   2. Reduce intake of pop and chocolate milk consumption (I.e. purchase 1 carton vs 2 cartons of chocolate milk/month to start).   3. Work on increasing the exercise again back to daily (either walking or going to the [facility]).     Patient to follow up in 1 month(s) with RD      Video-Visit Details    Type of service:  Video Visit    Video End Time:1:29 PM    Originating Location (pt. Location): Home    Distant Location (provider location):  [facility] SURGERY CLINIC AND BARIATRICS CARE [place]     Platform used for Video Visit:  Xin Saldaña RD        Again, thank you for allowing me to participate in the care of your patient.        Sincerely,        Shima Saldaña RD

## 2021-10-27 NOTE — PROGRESS NOTES
Jane Lee is a 26 year old who is being evaluated via a billable video visit.      How would you like to obtain your AVS? MyChart  If the video visit is dropped, the invitation should be resent by: Text to cell phone: [phone]  Will anyone else be joining your video visit? No      Video Start Time: 1:05 pm      Medical  Weight Loss Follow-Up Diet Evaluation  Assessment:  Jane is presenting today for a follow up weight management nutrition consultation. Pt has had an initial appointment with Dr. Marcano.   Weight loss medication: None .   Pt's weight is 360 lbs    No flowsheet data found.  BMI: There is no height or weight on file to calculate BMI.  Ideal body weight: 54.7 kg (120 lb 9.5 oz)  Adjusted ideal body weight: 98.1 kg (216 lb 5.7 oz)    Estimated RMR ([place]-St Jeor equation):   2370 kcals x 1.3 (light active) = 3081 kcals (for weight maintenance)     Recommended Protein Intake: 60-80 grams of protein/day  Patient Active Problem List:  Patient Active Problem List   Diagnosis     Morbid obesity (H)     Autism spectrum disorder     Moderate episode of recurrent major depressive disorder (H)     Hypothyroidism, unspecified type     YARA (generalized anxiety disorder)     Mild intellectual disability     Attention deficit hyperactivity disorder (ADHD), unspecified ADHD type     Oppositional defiant disorder     History of seizures     Lactose intolerance     Migraine without aura and without status migrainosus, not intractable     Non-seasonal allergic rhinitis due to pollen     Gastroesophageal reflux disease without esophagitis     Constipation, unspecified constipation type     Lives in group home     Polycystic ovary syndrome     Peripheral vision loss, bilateral     Impaired physical mobility     Hirsutism     Eczema     Borderline personality disorder (H)     Diabetes: No     Progress on goals from last visit: Patient reports that she has been working on smaller portion sizes of calorie  containing beverages (trying to limit to 1 cup/day of pop).  Patient reports that she is trying to focus on lower calorie/sugar free options (such as Crystal Light).  Patient reports that she is still purchasing 2 cartons of Chocolate Milk/month.  Patient reports that she continues to work on portion control, noting that she is trying to avoid second helpings.   Continues to eat her protein first at meals.      Exercise: limited due to cellulitis, so walking has been down more recently.  Did walk today for an hour, noted that the cellulitis is getting better.       Nutrition Diagnosis:    Overweight/Obesity (NC 3.3) related to overeating and poor lifestyle habits as evidenced by patient's subjective statements (excessive energy intake) and BMI of 61.79 kg/m2.     Intervention:  1. Food and/or nutrient delivery: balanced meals, adequate protein   2. Nutrition education: calorie containing beverages, portion sizes   3. Nutrition counseling: exercise regimen     Monitoring/Evaluation:    Goals:  1. Continue to work on portion control at meals, avoid going back for seconds and try eating slowly (20 minutes/meal).   2. Reduce intake of pop and chocolate milk consumption (I.e. purchase 1 carton vs 2 cartons of chocolate milk/month to start).   3. Work on increasing the exercise again back to daily (either walking or going to the [facility]).     Patient to follow up in 1 month(s) with MARCELINA      Video-Visit Details    Type of service:  Video Visit    Video End Time:1:29 PM    Originating Location (pt. Location): Home    Distant Location (provider location):  [facility] SURGERY [facility] AND BARIATRICS CARE [place]     Platform used for Video Visit: Xin Saldaña RD

## 2021-11-01 ENCOUNTER — MEDICAL CORRESPONDENCE (OUTPATIENT)
Dept: HEALTH INFORMATION MANAGEMENT | Facility: CLINIC | Age: 26
End: 2021-11-01
Payer: MEDICAID

## 2021-11-01 ENCOUNTER — TRANSFERRED RECORDS (OUTPATIENT)
Dept: HEALTH INFORMATION MANAGEMENT | Facility: CLINIC | Age: 26
End: 2021-11-01
Payer: MEDICAID

## 2021-11-01 DIAGNOSIS — Z79.899 HIGH RISK MEDICATION USE: Primary | ICD-10-CM

## 2021-11-04 ENCOUNTER — TELEPHONE (OUTPATIENT)
Dept: SLEEP MEDICINE | Facility: CLINIC | Age: 26
End: 2021-11-04

## 2021-11-04 ENCOUNTER — TELEPHONE (OUTPATIENT)
Dept: FAMILY MEDICINE | Facility: CLINIC | Age: 26
End: 2021-11-04

## 2021-11-04 NOTE — TELEPHONE ENCOUNTER
Reason for call:  Other   Patient called regarding (reason for call): appointment  Additional comments: Patient's group home called to request a callback to reschedule the patient's missed study    Phone number to reach patient:  Cell number on file:    Telephone Information:   Mobile [phone]       Best Time:  any    Can we leave a detailed message on this number?  YES    Travel screening: Negative

## 2021-11-05 NOTE — TELEPHONE ENCOUNTER
Staff was unaware patient had sleep study scheduled, information was given along with phone number to call and have ABG testing done.

## 2021-11-08 ENCOUNTER — LAB (OUTPATIENT)
Dept: LAB | Facility: CLINIC | Age: 26
End: 2021-11-08
Payer: MEDICAID

## 2021-11-08 DIAGNOSIS — Z79.899 HIGH RISK MEDICATION USE: ICD-10-CM

## 2021-11-08 LAB — LITHIUM SERPL-SCNC: 1 MMOL/L

## 2021-11-08 PROCEDURE — 36415 COLL VENOUS BLD VENIPUNCTURE: CPT

## 2021-11-08 PROCEDURE — 80178 ASSAY OF LITHIUM: CPT

## 2021-11-23 ENCOUNTER — VIRTUAL VISIT (OUTPATIENT)
Dept: SURGERY | Facility: CLINIC | Age: 26
End: 2021-11-23
Payer: MEDICAID

## 2021-11-23 DIAGNOSIS — E66.01 MORBID OBESITY WITH BMI OF 60.0-69.9, ADULT (H): ICD-10-CM

## 2021-11-23 DIAGNOSIS — Z71.3 NUTRITIONAL COUNSELING: ICD-10-CM

## 2021-11-23 DIAGNOSIS — K21.9 GASTROESOPHAGEAL REFLUX DISEASE WITHOUT ESOPHAGITIS: Primary | ICD-10-CM

## 2021-11-23 DIAGNOSIS — E78.5 DYSLIPIDEMIA: ICD-10-CM

## 2021-11-23 PROCEDURE — 97803 MED NUTRITION INDIV SUBSEQ: CPT | Mod: 95 | Performed by: DIETITIAN, REGISTERED

## 2021-11-23 NOTE — PROGRESS NOTES
Jane Lee is a 26 year old who is being evaluated via a billable video visit.      How would you like to obtain your AVS? MyChart  If the video visit is dropped, the invitation should be resent by: Text to cell phone: [phone]  Will anyone else be joining your video visit? No      Video Start Time: 1:34 pm       Medical  Weight Loss Follow-Up Diet Evaluation  Assessment:  Jane is presenting today for a follow up weight management nutrition consultation. Pt has had an initial appointment with Dr. Marcano  Weight loss medication: None .  Pt's weight is 360 lbs    No flowsheet data found.  BMI: There is no height or weight on file to calculate BMI.  Ideal body weight: 54.7 kg (120 lb 9.5 oz)  Adjusted ideal body weight: 98.1 kg (216 lb 5.7 oz)    Estimated RMR ([place]-St Jeor equation):   2370 kcals x 1.3 (light active) = 3081 kcals (for weight maintenance)     Recommended Protein Intake: 60-80 grams of protein/day  Patient Active Problem List:  Patient Active Problem List   Diagnosis     Morbid obesity (H)     Autism spectrum disorder     Moderate episode of recurrent major depressive disorder (H)     Hypothyroidism, unspecified type     YARA (generalized anxiety disorder)     Mild intellectual disability     Attention deficit hyperactivity disorder (ADHD), unspecified ADHD type     Oppositional defiant disorder     History of seizures     Lactose intolerance     Migraine without aura and without status migrainosus, not intractable     Non-seasonal allergic rhinitis due to pollen     Gastroesophageal reflux disease without esophagitis     Constipation, unspecified constipation type     Lives in group home     Polycystic ovary syndrome     Peripheral vision loss, bilateral     Impaired physical mobility     Hirsutism     Eczema     Borderline personality disorder (H)     Diabetes: No     Progress on goals from last visit: continues to work on smaller portion sizes, making sure she is portioning things out with  staff ahead of time.  Patient reports that she limiting her snacks as well.  Patient reports that she continues to work on eating her protein first.  Patient reports that she has been trying to drink more water throughout the day as well as Crystal Light.  Patient reports that she will drink a glass of milk at dinner.  Patient reports that she continues working on reducing her pop and chocolate milk consumption.     Exercise: Walking every other day for 60 minutes, Sit Ups and Push Ups 1-2 times/day (more recently)     Nutrition Diagnosis:  Overweight/Obesity (NC 3.3) related to overeating and poor lifestyle habits as evidenced by patient's subjective statements (excessive energy intake) and BMI of 61.79 kg/m2.       Intervention:  1. Food and/or nutrient delivery: balanced meals, adequate protein   2. Nutrition education: calorie containing beverages, SF options, Holiday Eating   3. Nutrition counseling: exercise regimen       Monitoring/Evaluation:    Goals:  1. Continue to work on reduction of pop and chocolate milk consumption, focusing more so on water and Crystal Light for adequate hydration.   2. Continue to work on portion sizes at meals, focusing on protein first.   3. Exercise most days of the week for 60 minutes.     Patient to follow up in 1 month(s) with MARCELINA      Video-Visit Details    Type of service:  Video Visit    Video End Time:1:58 pm     Originating Location (pt. Location): Home    Distant Location (provider location):  [facility] SURGERY [facility] AND BARIATRICS [facility]     Platform used for Video Visit: Blanca Saldaña RD

## 2021-11-23 NOTE — LETTER
11/23/2021         RE: Jane Lee  [address] [address]        Dear Colleague,    Thank you for referring your patient, Jane Lee, to the [facility] SURGERY CLINIC AND BARIATRICS CARE [place]. Please see a copy of my visit note below.    Jane Lee is a 26 year old who is being evaluated via a billable video visit.      How would you like to obtain your AVS? MyChart  If the video visit is dropped, the invitation should be resent by: Text to cell phone: [phone]  Will anyone else be joining your video visit? No      Video Start Time: 1:34 pm       Medical  Weight Loss Follow-Up Diet Evaluation  Assessment:  Jane is presenting today for a follow up weight management nutrition consultation. Pt has had an initial appointment with Dr. Marcano  Weight loss medication: None .  Pt's weight is 360 lbs    No flowsheet data found.  BMI: There is no height or weight on file to calculate BMI.  Ideal body weight: 54.7 kg (120 lb 9.5 oz)  Adjusted ideal body weight: 98.1 kg (216 lb 5.7 oz)    Estimated RMR ([place]-St Jeor equation):   2370 kcals x 1.3 (light active) = 3081 kcals (for weight maintenance)     Recommended Protein Intake: 60-80 grams of protein/day  Patient Active Problem List:  Patient Active Problem List   Diagnosis     Morbid obesity (H)     Autism spectrum disorder     Moderate episode of recurrent major depressive disorder (H)     Hypothyroidism, unspecified type     YARA (generalized anxiety disorder)     Mild intellectual disability     Attention deficit hyperactivity disorder (ADHD), unspecified ADHD type     Oppositional defiant disorder     History of seizures     Lactose intolerance     Migraine without aura and without status migrainosus, not intractable     Non-seasonal allergic rhinitis due to pollen     Gastroesophageal reflux disease without esophagitis     Constipation, unspecified constipation type     Lives in group home     Polycystic ovary syndrome      Peripheral vision loss, bilateral     Impaired physical mobility     Hirsutism     Eczema     Borderline personality disorder (H)     Diabetes: No     Progress on goals from last visit: continues to work on smaller portion sizes, making sure she is portioning things out with staff ahead of time.  Patient reports that she limiting her snacks as well.  Patient reports that she continues to work on eating her protein first.  Patient reports that she has been trying to drink more water throughout the day as well as Crystal Light.  Patient reports that she will drink a glass of milk at dinner.  Patient reports that she continues working on reducing her pop and chocolate milk consumption.     Exercise: Walking every other day for 60 minutes, Sit Ups and Push Ups 1-2 times/day (more recently)     Nutrition Diagnosis:  Overweight/Obesity (NC 3.3) related to overeating and poor lifestyle habits as evidenced by patient's subjective statements (excessive energy intake) and BMI of 61.79 kg/m2.       Intervention:  1. Food and/or nutrient delivery: balanced meals, adequate protein   2. Nutrition education: calorie containing beverages, SF options, Holiday Eating   3. Nutrition counseling: exercise regimen       Monitoring/Evaluation:    Goals:  1. Continue to work on reduction of pop and chocolate milk consumption, focusing more so on water and Crystal Light for adequate hydration.   2. Continue to work on portion sizes at meals, focusing on protein first.   3. Exercise most days of the week for 60 minutes.     Patient to follow up in 1 month(s) with RD      Video-Visit Details    Type of service:  Video Visit    Video End Time:1:58 pm     Originating Location (pt. Location): Home    Distant Location (provider location):  [facility] SURGERY [facility] AND BARIATRICS CARE [place]     Platform used for Video Visit: Blanca Saldaña RD        Again, thank you for allowing me to participate in the care of  your patient.        Sincerely,        Shima Saldaña, RD

## 2021-12-02 ENCOUNTER — HOSPITAL ENCOUNTER (EMERGENCY)
Facility: CLINIC | Age: 26
Discharge: HOME | End: 2021-12-02
Attending: NURSE PRACTITIONER | Admitting: NURSE PRACTITIONER
Payer: MEDICAID

## 2021-12-02 VITALS
WEIGHT: 293 LBS | SYSTOLIC BLOOD PRESSURE: 152 MMHG | OXYGEN SATURATION: 96 % | DIASTOLIC BLOOD PRESSURE: 68 MMHG | BODY MASS INDEX: 61.79 KG/M2 | TEMPERATURE: 98.1 F | RESPIRATION RATE: 20 BRPM | HEART RATE: 81 BPM

## 2021-12-02 DIAGNOSIS — B34.9 VIRAL SYNDROME: ICD-10-CM

## 2021-12-02 DIAGNOSIS — R05.9 COUGH: ICD-10-CM

## 2021-12-02 DIAGNOSIS — Z20.822 ENCOUNTER FOR LABORATORY TESTING FOR COVID-19 VIRUS: ICD-10-CM

## 2021-12-02 LAB
FLUAV RNA SPEC QL NAA+PROBE: NEGATIVE
FLUBV RNA RESP QL NAA+PROBE: NEGATIVE
SARS-COV-2 RNA RESP QL NAA+PROBE: NEGATIVE

## 2021-12-02 PROCEDURE — 99282 EMERGENCY DEPT VISIT SF MDM: CPT | Performed by: NURSE PRACTITIONER

## 2021-12-02 PROCEDURE — C9803 HOPD COVID-19 SPEC COLLECT: HCPCS | Performed by: NURSE PRACTITIONER

## 2021-12-02 PROCEDURE — 99283 EMERGENCY DEPT VISIT LOW MDM: CPT | Performed by: NURSE PRACTITIONER

## 2021-12-02 PROCEDURE — 87636 SARSCOV2 & INF A&B AMP PRB: CPT | Performed by: NURSE PRACTITIONER

## 2021-12-02 RX ORDER — GUAIFENESIN 600 MG/1
1200 TABLET, EXTENDED RELEASE ORAL 2 TIMES DAILY PRN
Qty: 30 TABLET | Refills: 0 | Status: SHIPPED | OUTPATIENT
Start: 2021-12-02 | End: 2022-06-07

## 2021-12-02 NOTE — ED TRIAGE NOTES
Cough and sore throat, lives in a group home.  No covid  At house. Was home with family for thanksgiving.

## 2021-12-02 NOTE — DISCHARGE INSTRUCTIONS
I will call you with the Covid test results. You may take a few hours. For the congestion I recommend Mucinex 600 to 1200 mg extended release once or twice daily as needed.  Until the Covid test is resulted I recommend isolation.    [id number]

## 2021-12-02 NOTE — ED PROVIDER NOTES
History     Chief Complaint   Patient presents with     URI     HPI    SUBJECTIVE: Jane Lee  is here today because of:Cough  The patient has had symptoms of fever, cough, sore throat, nasal congestion/runny nose and sneezing.   Onset of symptoms was 4 days ago. Course of illness is fever and sore throat is resolved and persistent nasal congestion, sneezing, and cough that is worse at night.  Patient admits to exposure to illness at home at [facility]   Patient denies earache, vomiting, diarrhea, chest congestion, wheezing, decreased appetite and decreased activity  Treatment measures tried include tylenol, honey water.  Patient is not exposed to tobacco  Patient is fully Covid vaccinated.  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       Impaired physical mobility 03/19/2021     Priority: Medium     Hirsutism 03/19/2021     Priority: Medium     Eczema 03/19/2021     Priority: Medium     Lives in group home 09/30/2020     Priority: Medium     Constipation, unspecified constipation type 07/09/2020     Priority: Medium     Gastroesophageal reflux disease without esophagitis 07/01/2020     Priority: Medium     Autism spectrum disorder 09/22/2019     Priority: Medium     Moderate episode of recurrent major depressive disorder (H) 09/22/2019     Priority: Medium     Hypothyroidism, unspecified type 09/22/2019     Priority: Medium     YARA (generalized anxiety disorder) 09/22/2019     Priority: Medium     Mild intellectual disability 09/22/2019     Priority: Medium     Attention  "deficit hyperactivity disorder (ADHD), unspecified ADHD type 09/22/2019     Priority: Medium     Oppositional defiant disorder 09/22/2019     Priority: Medium     History of seizures 09/22/2019     Priority: Medium     Last seizure in her teens       Lactose intolerance 09/22/2019     Priority: Medium     Migraine without aura and without status migrainosus, not intractable 09/22/2019     Priority: Medium     Non-seasonal allergic rhinitis due to pollen 09/22/2019     Priority: Medium     Morbid obesity (H) 09/18/2019     Priority: Medium        Past Medical History:    Past Medical History:   Diagnosis Date     Anxiety and depression      Autism      Autism spectrum disorder      Dyslipidemia      Eczema      GERD (gastroesophageal reflux disease)      History of seizures      Hypothyroidism      Impaired physical mobility      Migraine      Morbid obesity with BMI of 60.0-69.9, adult (H)      Oppositional defiant disorder      Peripheral vision loss, bilateral      Polycystic ovary syndrome      Seasonal allergies        Past Surgical History:    Past Surgical History:   Procedure Laterality Date     BRAIN SURGERY  2000    for seizures. 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\"     BRAIN SURGERY  2000    for epilepsy age 4     TOENAIL EXCISION Right     ingrowing toenail removal      WISDOM TOOTH EXTRACTION         Family History:    Family History   Problem Relation Age of Onset     Diabetes Mother      Coronary Artery Disease Mother      Obesity Mother      Breast Cancer Paternal Aunt 50     Bipolar Disorder Mother        Social History:  Marital Status:  Single [1]  Social History     Tobacco Use     Smoking status: Never Smoker     Smokeless tobacco: Never Used   Substance Use Topics     Alcohol use: Never     Drug use: Never        Medications:    guaiFENesin (MUCINEX) 600 MG 12 hr tablet  albuterol (PROAIR HFA/PROVENTIL HFA/VENTOLIN HFA) 108 (90 Base) MCG/ACT inhaler  ALPRAZolam (XANAX) 0.5 " MG tablet  cephALEXin (KEFLEX) 500 MG capsule  famotidine (PEPCID) 20 MG tablet  GAVILAX 17 GM/SCOOP powder  LACTASE ENZYME 3000 units tablet  levonorgestrel-ethinyl estradiol (SEASONALE) 0.15-0.03 MG tablet  levothyroxine (SYNTHROID/LEVOTHROID) 75 MCG tablet  lithium (ESKALITH CR/LITHOBID) 450 MG CR tablet  loratadine (CLARITIN) 10 MG tablet  melatonin 1 MG TABS tablet  OLANZapine (ZYPREXA) 5 MG tablet  OXcarbazepine (TRILEPTAL) 600 MG tablet  SUMAtriptan (IMITREX) 50 MG tablet  traZODone (DESYREL) 150 MG tablet  venlafaxine (EFFEXOR-XR) 75 MG 24 hr capsule      Review of Systems  As mentioned above in the history present illness. All other systems were reviewed and are negative.    Physical Exam   BP: (!) 152/68  Pulse: 81  Temp: 98.1  F (36.7  C)  Resp: 20  Weight: (!) 163.3 kg (360 lb)  SpO2: 96 %      Physical Exam  GENERAL: no apparent distress  EYES: Conjunctiva are not injected, no discharge.  EARS: Left TM -no erythema, no effusion,  not bulged.               Right TM -no erythema, no effusion,  not bulged.  NOSE: no discharge, no sinus tenderness, sounds congested  THROAT: no erythema, no exudate, no lesions  NECK: supple, mild anterior chain adenopathy.  CARDIAC: regular rate and rhythm, no murmur  RESP: clear, no wheezing, no rales, no rhonchi, harsh non-productive noted  ABD: soft, no distension, no tenderness  SKIN: No rashes    ED Course        Procedures      Results for orders placed or performed during the hospital encounter of 12/02/21 (from the past 24 hour(s))   Symptomatic Influenza A/B & SARS-CoV2 (COVID-19) Virus PCR Multiplex Nasopharyngeal    Specimen: Nasopharyngeal; Swab   Result Value Ref Range    Influenza A PCR Negative Negative    Influenza B PCR Negative Negative    SARS CoV2 PCR Negative Negative    Narrative    Testing was performed using the kenneth SARS-CoV-2 & Influenza A/B Assay on the kenneth Candice System. This test should be ordered for the detection of SARS-CoV-2 and influenza  viruses in individuals who meet clinical and/or epidemiological criteria. Test performance is unknown in asymptomatic patients. This test is for in vitro diagnostic use under the FDA EUA for laboratories certified under CLIA to perform moderate and/or high complexity testing. This test has not been FDA cleared or approved. A negative result does not rule out the presence of PCR inhibitors in the specimen or target RNA in concentration below the limit of detection for the assay. If only one viral target is positive but coinfection with multiple targets is suspected, the sample should be re-tested with another FDA cleared, approved or authorized test, if coinfection would change clinical management. [facility] are certified under the Clinical Laboratory Improvement Amendments of 1988 (CLIA-88) as  qualified to perform moderate and/or high complexity laboratory testing.       Medications - No data to display    Assessments & Plan (with Medical Decision Making)     I have reviewed the nursing notes.    I have reviewed the findings, diagnosis, plan and need for follow up with the patient.    Medical Decision Making:  CXR is not indicated.  Rapid Strep test is not indicated.  Covid and influenza testing obtained.  Patient discharged.  Advised will call with results.    Assessment:  1) Viral syndrome, Viral upper respiratory illness and COVID testing..    PLAN:  Covid testing and influenza testing negative.  Will treat as viral URI and discussed Mucinex for help with cough.  Encourage follow-up as needed.  Patient discharged in stable condition prior to results being made known.  Follow up with any questions or problems    Discharge Medication List as of 12/2/2021  1:11 PM      START taking these medications    Details   guaiFENesin (MUCINEX) 600 MG 12 hr tablet Take 2 tablets (1,200 mg) by mouth 2 times daily as needed for congestion, Disp-30 tablet, R-0, E-Prescribe             Final diagnoses:    Cough   Viral syndrome   Encounter for laboratory testing for COVID-19 virus       12/2/2021   [facility] EMERGENCY DEPT     Su Serrato, APRN CNP  12/02/21 [id number]

## 2021-12-13 ENCOUNTER — TELEPHONE (OUTPATIENT)
Dept: SLEEP MEDICINE | Facility: CLINIC | Age: 26
End: 2021-12-13

## 2021-12-13 ENCOUNTER — HOSPITAL ENCOUNTER (OUTPATIENT)
Dept: RESPIRATORY THERAPY | Facility: CLINIC | Age: 26
Discharge: HOME | End: 2021-12-13
Attending: INTERNAL MEDICINE | Admitting: INTERNAL MEDICINE
Payer: MEDICAID

## 2021-12-13 LAB
BASE EXCESS BLDA CALC-SCNC: -2.7 MMOL/L (ref -9–1.8)
HCO3 BLD-SCNC: 22 MMOL/L (ref 21–28)
O2/TOTAL GAS SETTING VFR VENT: 0 %
PCO2 BLD: 35 MM HG (ref 35–45)
PH BLD: 7.4 [PH] (ref 7.35–7.45)
PO2 BLD: 79 MM HG (ref 80–105)

## 2021-12-13 PROCEDURE — 82803 BLOOD GASES ANY COMBINATION: CPT | Performed by: INTERNAL MEDICINE

## 2021-12-13 PROCEDURE — 36600 WITHDRAWAL OF ARTERIAL BLOOD: CPT

## 2021-12-13 NOTE — TELEPHONE ENCOUNTER
Kathy from [facility] lab is calling with a critical result. Patient had her ABG drawn today in anticipation of a sleep study tomorrow.  She had a critical value of 79 for PO2.  Result in patient chart already.  Patient did not seem to be hypo or hyperventilating when blood draw was completed per tech. Brandy Holly, CMA

## 2021-12-13 NOTE — RESULT ENCOUNTER NOTE
O2 levels slightly lower than expected for age.   No further evaluation needed at this time  Co2 levels are normal

## 2021-12-13 NOTE — LETTER
December 15, 2021      Jane Lee  [address] [address] [address]        Dear ,    We are writing to inform you of your test results:       Your O2 levels slightly lower than expected for age.   No further evaluation needed at this time.   Co2 levels are normal.   Jeremie Turner MD      Resulted Orders   Blood gas arterial   Result Value Ref Range    pH Arterial 7.40 7.35 - 7.45    pCO2 Arterial 35 35 - 45 mm Hg    pO2 Arterial 79 (L) 80 - 105 mm Hg    FIO2 0       Comment:      97%    Bicarbonate Arterial 22 21 - 28 mmol/L    Base Excess/Deficit (+/-) -2.7 -9.0 - 1.8 mmol/L       If you have any questions or concerns, please call the clinic at the number listed above.       Sincerely,      Jeremie Turner MD

## 2021-12-14 ENCOUNTER — TELEPHONE (OUTPATIENT)
Dept: SLEEP MEDICINE | Facility: CLINIC | Age: 26
End: 2021-12-14

## 2021-12-14 NOTE — TELEPHONE ENCOUNTER
Reason for call:  Other   Patient called regarding (reason for call): call back  Additional comments: Patient staffing from [facility] is calling needing to reschedule sleep study appt tonight at 8pm patient dose not have  to be able to make the appt. Please Contact staffing [phone] to reschedule sleep study appointment     Phone number to reach patient:  Cell number on file:    Telephone Information:   Mobile [phone]       Best Time:  Anytime    Can we leave a detailed message on this number?  YES    Travel screening: Not Applicable

## 2021-12-17 NOTE — TELEPHONE ENCOUNTER
Sleep study and follow-up appointments rescheduled. New PSG paperwork mailed to patient and staff. Brandy Holly, CMA

## 2021-12-20 ENCOUNTER — VIRTUAL VISIT (OUTPATIENT)
Dept: SURGERY | Facility: CLINIC | Age: 26
End: 2021-12-20
Payer: MEDICAID

## 2021-12-20 DIAGNOSIS — E78.5 DYSLIPIDEMIA: ICD-10-CM

## 2021-12-20 DIAGNOSIS — K21.9 GASTROESOPHAGEAL REFLUX DISEASE WITHOUT ESOPHAGITIS: Primary | ICD-10-CM

## 2021-12-20 DIAGNOSIS — Z71.3 NUTRITIONAL COUNSELING: ICD-10-CM

## 2021-12-20 DIAGNOSIS — E66.01 MORBID OBESITY WITH BMI OF 60.0-69.9, ADULT (H): ICD-10-CM

## 2021-12-20 PROCEDURE — 97803 MED NUTRITION INDIV SUBSEQ: CPT | Mod: 95 | Performed by: DIETITIAN, REGISTERED

## 2021-12-20 NOTE — LETTER
12/20/2021         RE: Jane Lee  [address] [address] [facility] [id number]        Dear Colleague,    Thank you for referring your patient, Jane Lee, to the [facility] AND BARIATRICS CARE [place]. Please see a copy of my visit note below.    Jane Lee is a 26 year old who is being evaluated via a billable video visit.      How would you like to obtain your AVS? MyChart  If the video visit is dropped, the invitation should be resent by: Text to cell phone: [phone]  Will anyone else be joining your video visit? No      Video Start Time: 1:03 pm      Medical  Weight Loss Follow-Up Diet Evaluation  Assessment:  Jane is presenting today for a follow up weight management nutrition consultation. Pt has had an initial appointment with Dr. Marcano  Weight loss medication: None .   Pt's weight is 360 lbs    No flowsheet data found.  BMI: There is no height or weight on file to calculate BMI.  Ideal body weight: 54.7 kg (120 lb 9.5 oz)  Adjusted ideal body weight: 98.1 kg (216 lb 5.7 oz)    Estimated RMR ([place]-St Jeor equation):   2370 kcals x 1.3 (light active) = 3081 kcals (for weight maintenance)     Recommended Protein Intake: 60-80 grams of protein/day  Patient Active Problem List:  Patient Active Problem List   Diagnosis     Morbid obesity (H)     Autism spectrum disorder     Moderate episode of recurrent major depressive disorder (H)     Hypothyroidism, unspecified type     YARA (generalized anxiety disorder)     Mild intellectual disability     Attention deficit hyperactivity disorder (ADHD), unspecified ADHD type     Oppositional defiant disorder     History of seizures     Lactose intolerance     Migraine without aura and without status migrainosus, not intractable     Non-seasonal allergic rhinitis due to pollen     Gastroesophageal reflux disease without esophagitis     Constipation, unspecified constipation type     Lives in group home     Polycystic ovary syndrome      Peripheral vision loss, bilateral     Impaired physical mobility     Hirsutism     Eczema     Borderline personality disorder (H)     Diabetes: No     Progress on goals from last visit: Continues to work on making healthy choices at meals along with working on portion control.  Patient notes that she is still struggling with coffee drinks, noting that sometimes she forgets to ask for soy milk and the SF syrups, especially when at [place].  Patient reports that she wanted to meet with this writer to discuss holiday eating and eating away from home.     Beverages: Crystal Light, Coffee Beverages   Exercise: Walking most days of the week for about an hour pending the weather.  Push Ups and Sit Ups as well    Nutrition Diagnosis:    Overweight/Obesity (NC 3.3) related to overeating and poor lifestyle habits as evidenced by patient's subjective statements (excessive energy intake) and BMI of 61.79 kg/m2.     Intervention:  1. Food and/or nutrient delivery: balanced meals, adequate protein   2. Nutrition education: holiday eating, eating out tips   3. Nutrition counseling: exercise regimen     Monitoring/Evaluation:    Goals:  1. Be consistent with asking for SF syrup and low fat milk when having coffee drinks.   2. Work on smaller portions and eating your protein first during the holidays.   3. Continue to exercise most days of the week for at least 60 minutes.     Patient to follow up in 1 month(s) with MARCELINA      Video-Visit Details    Type of service:  Video Visit    Video End Time:1:28 PM    Originating Location (pt. Location): Home    Distant Location (provider location):  [facility] SURGERY [facility] AND BARIATRICS CARE [place]     Platform used for Video Visit: Blanca Saldaña RD        Again, thank you for allowing me to participate in the care of your patient.        Sincerely,        Shima Saldaña RD

## 2021-12-20 NOTE — PROGRESS NOTES
Jane Lee is a 26 year old who is being evaluated via a billable video visit.      How would you like to obtain your AVS? MyChart  If the video visit is dropped, the invitation should be resent by: Text to cell phone: [phone]  Will anyone else be joining your video visit? No      Video Start Time: 1:03 pm      Medical  Weight Loss Follow-Up Diet Evaluation  Assessment:  Jane is presenting today for a follow up weight management nutrition consultation. Pt has had an initial appointment with Dr. Marcano  Weight loss medication: None .   Pt's weight is 360 lbs    No flowsheet data found.  BMI: There is no height or weight on file to calculate BMI.  Ideal body weight: 54.7 kg (120 lb 9.5 oz)  Adjusted ideal body weight: 98.1 kg (216 lb 5.7 oz)    Estimated RMR ([place]-St Jeor equation):   2370 kcals x 1.3 (light active) = 3081 kcals (for weight maintenance)     Recommended Protein Intake: 60-80 grams of protein/day  Patient Active Problem List:  Patient Active Problem List   Diagnosis     Morbid obesity (H)     Autism spectrum disorder     Moderate episode of recurrent major depressive disorder (H)     Hypothyroidism, unspecified type     YARA (generalized anxiety disorder)     Mild intellectual disability     Attention deficit hyperactivity disorder (ADHD), unspecified ADHD type     Oppositional defiant disorder     History of seizures     Lactose intolerance     Migraine without aura and without status migrainosus, not intractable     Non-seasonal allergic rhinitis due to pollen     Gastroesophageal reflux disease without esophagitis     Constipation, unspecified constipation type     Lives in group home     Polycystic ovary syndrome     Peripheral vision loss, bilateral     Impaired physical mobility     Hirsutism     Eczema     Borderline personality disorder (H)     Diabetes: No     Progress on goals from last visit: Continues to work on making healthy choices at meals along with working on portion  control.  Patient notes that she is still struggling with coffee drinks, noting that sometimes she forgets to ask for soy milk and the SF syrups, especially when at [facility] and [facility].  Patient reports that she wanted to meet with this writer to discuss holiday eating and eating away from home.     Beverages: Crystal Light, Coffee Beverages   Exercise: Walking most days of the week for about an hour pending the weather.  Push Ups and Sit Ups as well    Nutrition Diagnosis:    Overweight/Obesity (NC 3.3) related to overeating and poor lifestyle habits as evidenced by patient's subjective statements (excessive energy intake) and BMI of 61.79 kg/m2.     Intervention:  1. Food and/or nutrient delivery: balanced meals, adequate protein   2. Nutrition education: holiday eating, eating out tips   3. Nutrition counseling: exercise regimen     Monitoring/Evaluation:    Goals:  1. Be consistent with asking for SF syrup and low fat milk when having coffee drinks.   2. Work on smaller portions and eating your protein first during the holidays.   3. Continue to exercise most days of the week for at least 60 minutes.     Patient to follow up in 1 month(s) with MACRELINA      Video-Visit Details    Type of service:  Video Visit    Video End Time:1:28 PM    Originating Location (pt. Location): Home    Distant Location (provider location):  [facility] SURGERY [facility] AND BARIATRICS CARE [place]     Platform used for Video Visit: Blanca Saldaña RD

## 2021-12-29 DIAGNOSIS — E03.9 HYPOTHYROIDISM, UNSPECIFIED TYPE: ICD-10-CM

## 2021-12-30 RX ORDER — LEVOTHYROXINE SODIUM 75 UG/1
TABLET ORAL
Qty: 28 TABLET | Refills: 2 | Status: SHIPPED | OUTPATIENT
Start: 2021-12-30 | End: 2022-03-25

## 2021-12-30 NOTE — TELEPHONE ENCOUNTER
Prescription approved per [facility] Refill Protocol.  Genny Canales RN

## 2022-01-06 ENCOUNTER — TELEPHONE (OUTPATIENT)
Dept: SURGERY | Facility: CLINIC | Age: 27
End: 2022-01-06
Payer: MEDICAID

## 2022-01-06 NOTE — TELEPHONE ENCOUNTER
Rec'd call from pt's group home. They don't feel that the Topamax is helping with the pt's appetite and would like to stop it. I told them that they should taper down and I will let  know.    Seble Almonte RN, CBN  [facility]  P [phone]  F [phone]

## 2022-01-21 ENCOUNTER — VIRTUAL VISIT (OUTPATIENT)
Dept: SURGERY | Facility: CLINIC | Age: 27
End: 2022-01-21
Payer: MEDICAID

## 2022-01-21 DIAGNOSIS — K21.9 GASTROESOPHAGEAL REFLUX DISEASE WITHOUT ESOPHAGITIS: Primary | ICD-10-CM

## 2022-01-21 DIAGNOSIS — E66.01 MORBID OBESITY WITH BMI OF 60.0-69.9, ADULT (H): ICD-10-CM

## 2022-01-21 DIAGNOSIS — Z71.3 NUTRITIONAL COUNSELING: ICD-10-CM

## 2022-01-21 DIAGNOSIS — E78.5 DYSLIPIDEMIA: ICD-10-CM

## 2022-01-21 PROCEDURE — 97803 MED NUTRITION INDIV SUBSEQ: CPT | Mod: 95 | Performed by: DIETITIAN, REGISTERED

## 2022-01-21 NOTE — PROGRESS NOTES
Jane Lee is a 26 year old who is being evaluated via a billable video visit.      How would you like to obtain your AVS? MyChart  If the video visit is dropped, the invitation should be resent by: Text to cell phone: [phone]  Will anyone else be joining your video visit? No      Video Start Time: 1:02 pm       Medical  Weight Loss Follow-Up Diet Evaluation  Assessment:  Jane is presenting today for a follow up weight management nutrition consultation. Pt has had an initial appointment with Dr. Marcano   Weight loss medication: None .   Pt's weight is 360 lbs (scale at home doesn't go up high enough, therefore patient does not have an updated weight for today's visit)      No flowsheet data found.  BMI: There is no height or weight on file to calculate BMI.  Ideal body weight: 54.7 kg (120 lb 9.5 oz)  Adjusted ideal body weight: 98.1 kg (216 lb 5.7 oz)    Estimated RMR ([place]-St Jeor equation):   2370 kcals x 1.3 (light active) = 3081 kcals (for weight maintenance)     Recommended Protein Intake: 60-80 grams of protein/day  Patient Active Problem List:  Patient Active Problem List   Diagnosis     Morbid obesity (H)     Autism spectrum disorder     Moderate episode of recurrent major depressive disorder (H)     Hypothyroidism, unspecified type     YARA (generalized anxiety disorder)     Mild intellectual disability     Attention deficit hyperactivity disorder (ADHD), unspecified ADHD type     Oppositional defiant disorder     History of seizures     Lactose intolerance     Migraine without aura and without status migrainosus, not intractable     Non-seasonal allergic rhinitis due to pollen     Gastroesophageal reflux disease without esophagitis     Constipation, unspecified constipation type     Lives in group home     Polycystic ovary syndrome     Peripheral vision loss, bilateral     Impaired physical mobility     Hirsutism     Eczema     Borderline personality disorder (H)     Diabetes: No    Progress  on goals from last visit: Patient reports that she has been focusing on protein first along with non starchy vegetables and limited fruit/carbs.  Patient reports that for snacks she has been trying to limit the candy and eat more healthy options.  Patient reports that she continues to be mindful regarding limiting her portion sizes of food at meals and even at snacks.      Beverages: Crystal Light 4 cups/day, 1.5 cups of milk/day, Water-trying to increase, Decaf Coffee, Coffee -SF Creamer  Exercise: 10-15 push-ups, 20 sit ups, walking, 10-15 jump jacks,  30 sec running in place, 15-20 squats most days of the week   [employer]-needs to reapply for a membership    Nutrition Diagnosis:    Overweight/Obesity (NC 3.3) related to overeating and poor lifestyle habits as evidenced by patient's subjective statements (excessive energy intake) and BMI of 61.79 kg/m2.     Intervention:  1. Food and/or nutrient delivery: balanced meals, adequate protein   2. Nutrition education: none-providing re-enforcement pertaining to portion sizes and eating protein first   3. Nutrition counseling: exercise regimen    Monitoring/Evaluation:    Goals:  1. Continue to work on smaller portion sizes at meals and snacks, noting to always eat protein and vegetables first to help fill up on.   2. Obtain [employer] membership to help increase exercise.     Patient to follow up in 1 month(s) with MARCELINA        Video-Visit Details    Type of service:  Video Visit    Video End Time:1:17 PM    Originating Location (pt. Location): Home    Distant Location (provider location):  [facility] SURGERY [facility] AND BARIATRICS CARE [place]     Platform used for Video Visit: Blanca Saldaña RD

## 2022-01-21 NOTE — LETTER
1/21/2022         RE: Jane Lee  [address] [facility] [address]        Dear Colleague,    Thank you for referring your patient, Jane Lee, to the [facility] SURGERY [facility] AND BARIATRICS CARE [place]. Please see a copy of my visit note below.    Jane Lee is a 26 year old who is being evaluated via a billable video visit.      How would you like to obtain your AVS? MyChart  If the video visit is dropped, the invitation should be resent by: Text to cell phone: [phone]  Will anyone else be joining your video visit? No      Video Start Time: 1:02 pm       Medical  Weight Loss Follow-Up Diet Evaluation  Assessment:  Jane is presenting today for a follow up weight management nutrition consultation. Pt has had an initial appointment with Dr. Marcano   Weight loss medication: None .   Pt's weight is 360 lbs (scale at home doesn't go up high enough, therefore patient does not have an updated weight for today's visit)      No flowsheet data found.  BMI: There is no height or weight on file to calculate BMI.  Ideal body weight: 54.7 kg (120 lb 9.5 oz)  Adjusted ideal body weight: 98.1 kg (216 lb 5.7 oz)    Estimated RMR (Fowler-St Jeor equation):   2370 kcals x 1.3 (light active) = 3081 kcals (for weight maintenance)     Recommended Protein Intake: 60-80 grams of protein/day  Patient Active Problem List:  Patient Active Problem List   Diagnosis     Morbid obesity (H)     Autism spectrum disorder     Moderate episode of recurrent major depressive disorder (H)     Hypothyroidism, unspecified type     YARA (generalized anxiety disorder)     Mild intellectual disability     Attention deficit hyperactivity disorder (ADHD), unspecified ADHD type     Oppositional defiant disorder     History of seizures     Lactose intolerance     Migraine without aura and without status migrainosus, not intractable     Non-seasonal allergic rhinitis due to pollen     Gastroesophageal reflux disease without  esophagitis     Constipation, unspecified constipation type     Lives in group home     Polycystic ovary syndrome     Peripheral vision loss, bilateral     Impaired physical mobility     Hirsutism     Eczema     Borderline personality disorder (H)     Diabetes: No    Progress on goals from last visit: Patient reports that she has been focusing on protein first along with non starchy vegetables and limited fruit/carbs.  Patient reports that for snacks she has been trying to limit the candy and eat more healthy options.  Patient reports that she continues to be mindful regarding limiting her portion sizes of food at meals and even at snacks.      Beverages: Crystal Light 4 cups/day, 1.5 cups of milk/day, Water-trying to increase, Decaf Coffee, Coffee -SF Creamer  Exercise: 10-15 push-ups, 20 sit ups, walking, 10-15 jump jacks,  30 sec running in place, 15-20 squats most days of the week   [employer]-needs to reapply for a membership    Nutrition Diagnosis:    Overweight/Obesity (NC 3.3) related to overeating and poor lifestyle habits as evidenced by patient's subjective statements (excessive energy intake) and BMI of 61.79 kg/m2.     Intervention:  1. Food and/or nutrient delivery: balanced meals, adequate protein   2. Nutrition education: none-providing re-enforcement pertaining to portion sizes and eating protein first   3. Nutrition counseling: exercise regimen    Monitoring/Evaluation:    Goals:  1. Continue to work on smaller portion sizes at meals and snacks, noting to always eat protein and vegetables first to help fill up on.   2. Obtain [employer] membership to help increase exercise.     Patient to follow up in 1 month(s) with RD        Video-Visit Details    Type of service:  Video Visit    Video End Time:1:17 PM    Originating Location (pt. Location): Home    Distant Location (provider location):  [facility] SURGERY [facility] AND BARIATRICS CARE [place]     Platform used for Video Visit: Blanca LAINEZ  MARCELINA Saldaña        Again, thank you for allowing me to participate in the care of your patient.        Sincerely,        Shima Saldaña RD

## 2022-01-26 DIAGNOSIS — Z87.898 HISTORY OF SEIZURES: ICD-10-CM

## 2022-01-27 RX ORDER — OXCARBAZEPINE 600 MG/1
TABLET, FILM COATED ORAL
Qty: 84 TABLET | Refills: 1 | Status: SHIPPED | OUTPATIENT
Start: 2022-01-27 | End: 2022-03-25

## 2022-01-27 NOTE — TELEPHONE ENCOUNTER
Routing refill request to provider for review/approval because:  Drug not on the [facility] refill protocol   Genny Canales RN

## 2022-02-04 ENCOUNTER — THERAPY VISIT (OUTPATIENT)
Dept: SLEEP MEDICINE | Facility: CLINIC | Age: 27
End: 2022-02-04
Payer: MEDICAID

## 2022-02-04 DIAGNOSIS — Z78.9 LIVES IN GROUP HOME: Chronic | ICD-10-CM

## 2022-02-04 DIAGNOSIS — G47.19 EXCESSIVE DAYTIME SLEEPINESS: ICD-10-CM

## 2022-02-04 DIAGNOSIS — F51.04 CHRONIC INSOMNIA: ICD-10-CM

## 2022-02-04 DIAGNOSIS — E66.01 MORBID OBESITY (H): Chronic | ICD-10-CM

## 2022-02-04 DIAGNOSIS — F84.0 AUTISM SPECTRUM DISORDER: Chronic | ICD-10-CM

## 2022-02-05 DIAGNOSIS — Z30.011 OCP (ORAL CONTRACEPTIVE PILLS) INITIATION: ICD-10-CM

## 2022-02-05 NOTE — NURSING NOTE
Patient arrived to sleep lab accompanied by staff from [facility]. Staff member was unable to stay with patient over night, although epic stated staff would be staying overnight with patient.  Patient's  was notified that staff was to stay overnight with patient, manager stated that they had called on 2 separate occasions asking if staff needed to be with patient overnight.  was told that no staff was required and specifically to not have any staff attend the study. It is unclear, who  spoke with. After checking voice messages,  found message left on 2/3 from  sleep lab that staff would be required to stay the night of study.  then made decision to reschedule study for night when staff would be able to remain with patient night of study.

## 2022-02-07 RX ORDER — LEVONORGESTREL AND ETHINYL ESTRADIOL 0.15-0.03
1 KIT ORAL DAILY
Qty: 91 TABLET | Refills: 12 | Status: SHIPPED | OUTPATIENT
Start: 2022-02-07 | End: 2022-05-12 | Stop reason: ALTCHOICE

## 2022-02-11 ENCOUNTER — TELEPHONE (OUTPATIENT)
Dept: SURGERY | Facility: CLINIC | Age: 27
End: 2022-02-11
Payer: MEDICAID

## 2022-02-11 NOTE — TELEPHONE ENCOUNTER
Spoke to patient over the phone, patient wanted to inform this writer that she has been doing extremely well in terms of weight loss and is now noticing that her clothes are even becoming loose and are falling off.  Patient reports that she continues to work on making healthy food choices and choosing sugar free beverages, especially pertaining to coffee.  Praised patient for positive changes made and providing ongoing encouragement and support.  Will officially follow-up with patient in 2 weeks.

## 2022-02-16 ENCOUNTER — TELEPHONE (OUTPATIENT)
Dept: FAMILY MEDICINE | Facility: CLINIC | Age: 27
End: 2022-02-16
Payer: MEDICAID

## 2022-02-16 DIAGNOSIS — G43.009 MIGRAINE WITHOUT AURA AND WITHOUT STATUS MIGRAINOSUS, NOT INTRACTABLE: Primary | ICD-10-CM

## 2022-02-16 RX ORDER — SUMATRIPTAN 50 MG/1
TABLET, FILM COATED ORAL
Qty: 9 TABLET | Refills: 0 | Status: SHIPPED | OUTPATIENT
Start: 2022-02-16 | End: 2023-08-25

## 2022-02-16 NOTE — TELEPHONE ENCOUNTER
Call received from Grace - staff member at patient's Group Home    Grace reports patient has a history of migraines and used to take Imitrex 50mg   Has not had a migraine in awhile   Grace is wondering if this medication is still active on patient's medication list    Reviewed chart and medication is listed as historical   Reported to Grace that author would forward to provider in clinic to review as [place] is not in clinic at this time     Grace requests refill be sent to [facility] in [place] if refill is appropriate    Patient reported to Grace that migraine she is currently having is the same as past migraines     Will forward to provider to review  Grace's call back number: [phone]    Jose Miguel Hurtado RN

## 2022-02-23 DIAGNOSIS — K21.9 GASTROESOPHAGEAL REFLUX DISEASE WITHOUT ESOPHAGITIS: ICD-10-CM

## 2022-02-23 RX ORDER — FAMOTIDINE 20 MG/1
TABLET, FILM COATED ORAL
Qty: 90 TABLET | Refills: 0 | Status: SHIPPED | OUTPATIENT
Start: 2022-02-23 | End: 2022-05-23

## 2022-02-24 ENCOUNTER — TELEPHONE (OUTPATIENT)
Dept: FAMILY MEDICINE | Facility: CLINIC | Age: 27
End: 2022-02-24
Payer: MEDICAID

## 2022-02-25 ENCOUNTER — VIRTUAL VISIT (OUTPATIENT)
Dept: SURGERY | Facility: CLINIC | Age: 27
End: 2022-02-25
Payer: MEDICAID

## 2022-02-25 DIAGNOSIS — E66.01 MORBID OBESITY WITH BMI OF 60.0-69.9, ADULT (H): ICD-10-CM

## 2022-02-25 DIAGNOSIS — Z71.3 NUTRITIONAL COUNSELING: ICD-10-CM

## 2022-02-25 DIAGNOSIS — E78.5 DYSLIPIDEMIA: ICD-10-CM

## 2022-02-25 DIAGNOSIS — K21.9 GASTROESOPHAGEAL REFLUX DISEASE WITHOUT ESOPHAGITIS: Primary | ICD-10-CM

## 2022-02-25 PROCEDURE — 97803 MED NUTRITION INDIV SUBSEQ: CPT | Mod: 95 | Performed by: DIETITIAN, REGISTERED

## 2022-02-25 NOTE — TELEPHONE ENCOUNTER
I have an appointment with her this afternoon, I will further discuss the issue with her then.  Thanks for the heads up.

## 2022-02-25 NOTE — PROGRESS NOTES
Jane Lee is a 26 year old who is being evaluated via a billable video visit.      How would you like to obtain your AVS? MyChart  If the video visit is dropped, the invitation should be resent by: Text to cell phone: [phone]  Will anyone else be joining your video visit? No      Video Start Time: 1:01 pm      Medical  Weight Loss Follow-Up Diet Evaluation  Assessment:  Jane is presenting today for a follow up weight management nutrition consultation. Pt has had an initial appointment with Dr. Marcano.   Weight loss medication: None .   Pt's weight is 360 lbs (last documented weight), patient reports that she is hoping that her mom is going to purchase her a new scale (like the Medical ones), since her scale is inaccurate and inconsistent since it is not even.  Patient reports that she is hoping to have a updated weight for this writer soon.  Patient as well as staff notes that patient's clothes are starting to quite loose.        No flowsheet data found.  BMI: There is no height or weight on file to calculate BMI.  Ideal body weight: 54.7 kg (120 lb 9.5 oz)  Adjusted ideal body weight: 98.1 kg (216 lb 5.7 oz)    Estimated RMR ([place]-St Jeor equation):   2370 kcals x 1.3 (light active) = 3081 kcals (for weight maintenance)     Recommended Protein Intake: 60-80 grams of protein/day  Patient Active Problem List:  Patient Active Problem List   Diagnosis     Morbid obesity (H)     Autism spectrum disorder     Moderate episode of recurrent major depressive disorder (H)     Hypothyroidism, unspecified type     YARA (generalized anxiety disorder)     Mild intellectual disability     Attention deficit hyperactivity disorder (ADHD), unspecified ADHD type     Oppositional defiant disorder     History of seizures     Lactose intolerance     Migraine without aura and without status migrainosus, not intractable     Non-seasonal allergic rhinitis due to pollen     Gastroesophageal reflux disease without esophagitis      Constipation, unspecified constipation type     Lives in group home     Polycystic ovary syndrome     Peripheral vision loss, bilateral     Impaired physical mobility     Hirsutism     Eczema     Borderline personality disorder (H)     Diabetes: No     Progress on goals from last visit: Continues to eat healthy, noting that she is trying to load up on the veggies at meals along with protein at meals.  Patient reports that she continues to work on portion sizes as well.  Patient reports that she is working on healthy snack choices such as fruit, veggies, a Smoothie, occasional fruit snack, yogurt.      Beverages: Water, Decaf Coffee, Crystal Light, 1 glass of Milk/day  Exercise: Walking daily for 60 minutes or 15 Jumping Jacks, Running in place, sit ups, up to 100 squats, push ups if its too cold outside to walk for 25-30 minutes     Nutrition Diagnosis:    Overweight/Obesity (NC 3.3) related to overeating and poor lifestyle habits as evidenced by patient's subjective statements (excessive energy intake) and BMI of 61.79 kg/m2.     Intervention:  1. Food and/or nutrient delivery: balanced meals, adequate protein   2. Nutrition education: birthday ideas, sugar free options   3. Nutrition counseling: praised patient for positive changes made, providing support and encouragement       Monitoring/Evaluation:    Goals:  1. Continue to work on smaller portion sizes at meal and snacks, noting to always eat protein first and use vegetables as a filler.   2. Continue to exercise daily (either walking or indoor exercises) for at least 30 minutes.     Patient to follow up in 1 month(s) with MARCELINA        Video-Visit Details    Type of service:  Video Visit    Video End Time:1:28 PM    Originating Location (pt. Location): Home    Distant Location (provider location):  [facility] SURGERY [facility] AND BARIATRICS CARE [place]     Platform used for Video Visit: Blanca Saldaña RD

## 2022-02-25 NOTE — LETTER
2/25/2022         RE: Jane Lee  [id number] [facility] [address]        Dear Colleague,    Thank you for referring your patient, Jane Lee, to the [facility] SURGERY CLINIC AND BARIATRICS CARE [place]. Please see a copy of my visit note below.    Jane Lee is a 26 year old who is being evaluated via a billable video visit.      How would you like to obtain your AVS? MyChart  If the video visit is dropped, the invitation should be resent by: Text to cell phone: [phone]  Will anyone else be joining your video visit? No      Video Start Time: 1:01 pm      Medical  Weight Loss Follow-Up Diet Evaluation  Assessment:  Jane is presenting today for a follow up weight management nutrition consultation. Pt has had an initial appointment with Dr. Marcano.   Weight loss medication: None .   Pt's weight is 360 lbs (last documented weight), patient reports that she is hoping that her mom is going to purchase her a new scale (like the Medical ones), since her scale is inaccurate and inconsistent since it is not even.  Patient reports that she is hoping to have a updated weight for this writer soon.  Patient as well as staff notes that patient's clothes are starting to quite loose.        No flowsheet data found.  BMI: There is no height or weight on file to calculate BMI.  Ideal body weight: 54.7 kg (120 lb 9.5 oz)  Adjusted ideal body weight: 98.1 kg (216 lb 5.7 oz)    Estimated RMR ([place]-St Jeor equation):   2370 kcals x 1.3 (light active) = 3081 kcals (for weight maintenance)     Recommended Protein Intake: 60-80 grams of protein/day  Patient Active Problem List:  Patient Active Problem List   Diagnosis     Morbid obesity (H)     Autism spectrum disorder     Moderate episode of recurrent major depressive disorder (H)     Hypothyroidism, unspecified type     YARA (generalized anxiety disorder)     Mild intellectual disability     Attention deficit hyperactivity disorder (ADHD),  unspecified ADHD type     Oppositional defiant disorder     History of seizures     Lactose intolerance     Migraine without aura and without status migrainosus, not intractable     Non-seasonal allergic rhinitis due to pollen     Gastroesophageal reflux disease without esophagitis     Constipation, unspecified constipation type     Lives in group home     Polycystic ovary syndrome     Peripheral vision loss, bilateral     Impaired physical mobility     Hirsutism     Eczema     Borderline personality disorder (H)     Diabetes: No     Progress on goals from last visit: Continues to eat healthy, noting that she is trying to load up on the veggies at meals along with protein at meals.  Patient reports that she continues to work on portion sizes as well.  Patient reports that she is working on healthy snack choices such as fruit, veggies, a Smoothie, occasional fruit snack, yogurt.      Beverages: Water, Decaf Coffee, Crystal Light, 1 glass of Milk/day  Exercise: Walking daily for 60 minutes or 15 Jumping Jacks, Running in place, sit ups, up to 100 squats, push ups if its too cold outside to walk for 25-30 minutes     Nutrition Diagnosis:    Overweight/Obesity (NC 3.3) related to overeating and poor lifestyle habits as evidenced by patient's subjective statements (excessive energy intake) and BMI of 61.79 kg/m2.     Intervention:  1. Food and/or nutrient delivery: balanced meals, adequate protein   2. Nutrition education: birthday ideas, sugar free options   3. Nutrition counseling: praised patient for positive changes made, providing support and encouragement       Monitoring/Evaluation:    Goals:  1. Continue to work on smaller portion sizes at meal and snacks, noting to always eat protein first and use vegetables as a filler.   2. Continue to exercise daily (either walking or indoor exercises) for at least 30 minutes.     Patient to follow up in 1 month(s) with MARCELINA        Video-Visit Details    Type of service:  Video  Visit    Video End Time:1:28 PM    Originating Location (pt. Location): Home    Distant Location (provider location):  [facility] SURGERY [facility] AND BARIATRICS [facility]     Platform used for Video Visit: Blanca Saldaña RD        Again, thank you for allowing me to participate in the care of your patient.        Sincerely,        Shima Saldaña RD

## 2022-02-28 ENCOUNTER — TELEPHONE (OUTPATIENT)
Dept: FAMILY MEDICINE | Facility: CLINIC | Age: 27
End: 2022-02-28

## 2022-02-28 NOTE — TELEPHONE ENCOUNTER
Reason for call:  Other   Patient called regarding (reason for call): call back  Additional comments: pt wants a call back, sounded confused on what she wants    Phone number to reach patient:  Cell number on file:    Telephone Information:   Mobile [phone]       Best Time:      Can we leave a detailed message on this number?  YES    Travel screening: Not Applicable

## 2022-03-11 ENCOUNTER — TELEPHONE (OUTPATIENT)
Dept: FAMILY MEDICINE | Facility: CLINIC | Age: 27
End: 2022-03-11

## 2022-03-11 ENCOUNTER — TELEPHONE (OUTPATIENT)
Dept: SURGERY | Facility: CLINIC | Age: 27
End: 2022-03-11
Payer: MEDICAID

## 2022-03-11 NOTE — TELEPHONE ENCOUNTER
Spoke with patient over the phone.  Patient wanted to let me know that she is down another 5 lbs, now down to 300 lbs even.  Patient reports that she continues to focus on sugar free beverages along with lots of water.  Patient reports that she continues to focus on high protein food choices at meals.  Patient reports that she continues to do exercise daily whether it is walking, push-ups, sit-ups, running in place, etc...depending upon the weather.  Praised patient for positive changes made this far, encouraged her to continue to push forward and keep up the good work.

## 2022-03-11 NOTE — TELEPHONE ENCOUNTER
I left a voicemail in regards to patient wanting to speak to this writer regarding weight status, if I do not get a hold of her, I see her for a follow-up March 29th.  Thanks, Shima

## 2022-03-11 NOTE — TELEPHONE ENCOUNTER
Please call patient back. She is seeing weight management clinic so most questions likely will defer to them.  Katelyn Travis PA-C

## 2022-03-14 ENCOUNTER — TELEPHONE (OUTPATIENT)
Dept: FAMILY MEDICINE | Facility: CLINIC | Age: 27
End: 2022-03-14
Payer: MEDICAID

## 2022-03-14 NOTE — TELEPHONE ENCOUNTER
Call placed to Group Home  No answer; will need to re-call at another time    Jose Miguel Hurtado RN

## 2022-03-14 NOTE — TELEPHONE ENCOUNTER
Reason for Call:  Other Caffeine restriction    Detailed comments: Patient is tired of caffeine restriction and wants it to end, she says she needs to get off of it    Phone Number Patient can be reached at: Work number on file:  [phone] (work)    Best Time: Any    Can we leave a detailed message on this number? YES    Call taken on 3/14/2022 at 1:08 PM by Rachel Schuster

## 2022-03-14 NOTE — TELEPHONE ENCOUNTER
I do think limiting caffeine in general is good, but we haven't talked about caffeine in the past - I'm not sure if this was restricted by her psychiatrist or from the nutritionist ?  I would recommend she reach out to the provider who recommended this.  Katelyn Travis PA-C

## 2022-03-15 NOTE — TELEPHONE ENCOUNTER
Call placed to number listed in encounter, detailed voicemail message left per patient request.  Relayed message per MORRIS Travis.  Genny Canales RN

## 2022-03-16 ENCOUNTER — TELEPHONE (OUTPATIENT)
Dept: SURGERY | Facility: CLINIC | Age: 27
End: 2022-03-16
Payer: MEDICAID

## 2022-03-16 NOTE — TELEPHONE ENCOUNTER
Pt again requests call from Shima. Might have missed on a few min ago.     Please use:  [phone]  **OK to leave detailed VM

## 2022-03-22 ENCOUNTER — TELEPHONE (OUTPATIENT)
Dept: SURGERY | Facility: CLINIC | Age: 27
End: 2022-03-22
Payer: MEDICAID

## 2022-03-22 NOTE — TELEPHONE ENCOUNTER
Multiple attempts made to contact patient in regards to patient wanting to discuss progress with this writer.  This writer was unable to connect with patient at this time via the phone, therefore left patient a detailed message including that this writer will be able to touch base with patient in regards to progress at next video visit that is scheduled for Tuesday, March 29th at 1:30 pm.

## 2022-03-23 ENCOUNTER — TELEPHONE (OUTPATIENT)
Dept: SURGERY | Facility: CLINIC | Age: 27
End: 2022-03-23
Payer: MEDICAID

## 2022-03-23 ENCOUNTER — TELEPHONE (OUTPATIENT)
Dept: FAMILY MEDICINE | Facility: CLINIC | Age: 27
End: 2022-03-23

## 2022-03-23 NOTE — TELEPHONE ENCOUNTER
Grace the group home lead would like a call back regarding [facility] calling     [phone]

## 2022-03-23 NOTE — TELEPHONE ENCOUNTER
Spoke to patient's caregiver, Grace over the phone today in regards to patient's obsessive nature to continue to contact this patient a couple times each week to update this writer.  Patient's caregiver would like this writer to reiterate to patient that from this point moving forward, we will touch base on a monthly basis at our appointment instead to help prevent her from being obsessed with updating this writer more frequently to help prevent frequent calling to the call center and disrupting them as well.  Patient is scheduled to have her next RD visit on Tuesday, 3/29 at 1:30 pm, in which we will discuss the plan moving forward.

## 2022-03-25 DIAGNOSIS — Z87.898 HISTORY OF SEIZURES: ICD-10-CM

## 2022-03-25 RX ORDER — OXCARBAZEPINE 600 MG/1
TABLET, FILM COATED ORAL
Qty: 84 TABLET | Refills: 12 | Status: SHIPPED | OUTPATIENT
Start: 2022-03-25 | End: 2022-11-04

## 2022-03-25 NOTE — TELEPHONE ENCOUNTER
Routing refill request to provider for review/approval because:  Drug not on the [facility] refill protocol     Jose Miguel Hurtado RN

## 2022-03-29 ENCOUNTER — VIRTUAL VISIT (OUTPATIENT)
Dept: SURGERY | Facility: CLINIC | Age: 27
End: 2022-03-29
Payer: MEDICAID

## 2022-03-29 DIAGNOSIS — K21.9 GASTROESOPHAGEAL REFLUX DISEASE WITHOUT ESOPHAGITIS: Primary | ICD-10-CM

## 2022-03-29 DIAGNOSIS — Z71.3 NUTRITIONAL COUNSELING: ICD-10-CM

## 2022-03-29 DIAGNOSIS — E66.813 CLASS 3 SEVERE OBESITY DUE TO EXCESS CALORIES WITH SERIOUS COMORBIDITY AND BODY MASS INDEX (BMI) OF 50.0 TO 59.9 IN ADULT (H): ICD-10-CM

## 2022-03-29 DIAGNOSIS — E66.01 CLASS 3 SEVERE OBESITY DUE TO EXCESS CALORIES WITH SERIOUS COMORBIDITY AND BODY MASS INDEX (BMI) OF 50.0 TO 59.9 IN ADULT (H): ICD-10-CM

## 2022-03-29 DIAGNOSIS — E78.5 DYSLIPIDEMIA: ICD-10-CM

## 2022-03-29 PROCEDURE — 97803 MED NUTRITION INDIV SUBSEQ: CPT | Mod: 95 | Performed by: DIETITIAN, REGISTERED

## 2022-03-29 NOTE — PROGRESS NOTES
Jane Lee is a 27 year old who is being evaluated via a billable video visit.      How would you like to obtain your AVS? MyChart  If the video visit is dropped, the invitation should be resent by: Text to cell phone: [phone]  Will anyone else be joining your video visit? No      Video Start Time: 1:33 pm      Medical  Weight Loss Follow-Up Diet Evaluation  Assessment:  Jane is presenting today for a follow up weight management nutrition consultation. Pt has had an initial appointment with Dr. Marcano.   Weight loss medication: None .   Pt's weight is 304 lbs  Initial weight: 360 (last documented weight as of 12/2021)      No flowsheet data found.  BMI: There is no height or weight on file to calculate BMI.  Patient weight not recorded    Estimated RMR ([place]-St Jeor equation):   2110 kcals x 1.3 (light active) = 2743 kcals (for weight maintenance)     Recommended Protein Intake: 60-80 grams of protein/day  Patient Active Problem List:  Patient Active Problem List   Diagnosis     Morbid obesity (H)     Autism spectrum disorder     Moderate episode of recurrent major depressive disorder (H)     Hypothyroidism, unspecified type     YARA (generalized anxiety disorder)     Mild intellectual disability     Attention deficit hyperactivity disorder (ADHD), unspecified ADHD type     Oppositional defiant disorder     History of seizures     Lactose intolerance     Migraine without aura and without status migrainosus, not intractable     Non-seasonal allergic rhinitis due to pollen     Gastroesophageal reflux disease without esophagitis     Constipation, unspecified constipation type     Lives in group home     Polycystic ovary syndrome     Peripheral vision loss, bilateral     Impaired physical mobility     Hirsutism     Eczema     Borderline personality disorder (H)     Diabetes: No     Progress on goals from last visit: Patient reports that she continues to work on eating protein first, smaller portion sizes,  and making healthy snack choices.  Patient reports that she has been eating out more frequently due to it being her birthday more recently as well as her sister's birthday and frequent travel to her mom's house.      Dietary Recall:  Breakfast: cereal with milk  Lunch:leftovers from the night  Dinner:home cooked meals-goulash, corn or salad   Typical snacks: yogurt or cottage cheese or smoothie   Eating out: 3 times/week   Beverages: Water, Crystal Light, Coffee with Creamer  Exercise: Push-ups, Sit Ups; Walking for 60 minutes (most days of the week)    Nutrition Diagnosis:    Overweight/Obesity (NC 3.3) related to overeating and poor lifestyle habits as evidenced by patient's subjective statements (excessive energy intake) and BMI of 52.3 kg/m2.     Intervention:  1. Food and/or nutrient delivery: balanced meals, adequate protein  2. Nutrition education: eating out frequency  3. Nutrition counseling: exercise regimen, praised patient for positive changes made      Monitoring/Evaluation:    Goals:  1. Work on limiting eating out to no more than 2 times/week.    2. Continue to focus on protein first at each meal along with lots of non starchy vegetables.   3. Continue to exercise most days of the week for 30-60 minutes.    Patient to follow up in 1 month(s) with MARCELINA        Video-Visit Details    Type of service:  Video Visit    Video End Time:1:52 PM    Originating Location (pt. Location): Home    Distant Location (provider location):  [facility] SURGERY [facility] AND BARIATRICS CARE [place]     Platform used for Video Visit: Blanca Saldaña RD

## 2022-03-29 NOTE — LETTER
3/29/2022         RE: Jane Lee  [address] [facility] [address]        Dear Colleague,    Thank you for referring your patient, Jane Lee, to the [facility] AND BARIATRICS CARE [place]. Please see a copy of my visit note below.    Jane Lee is a 27 year old who is being evaluated via a billable video visit.      How would you like to obtain your AVS? MyChart  If the video visit is dropped, the invitation should be resent by: Text to cell phone: [phone]  Will anyone else be joining your video visit? No      Video Start Time: 1:33 pm      Medical  Weight Loss Follow-Up Diet Evaluation  Assessment:  Jane is presenting today for a follow up weight management nutrition consultation. Pt has had an initial appointment with Dr. Marcano.   Weight loss medication: None .   Pt's weight is 304 lbs  Initial weight: 360 (last documented weight as of 12/2021)      No flowsheet data found.  BMI: There is no height or weight on file to calculate BMI.  Patient weight not recorded    Estimated RMR (Scott-St Jeor equation):   2110 kcals x 1.3 (light active) = 2743 kcals (for weight maintenance)     Recommended Protein Intake: 60-80 grams of protein/day  Patient Active Problem List:  Patient Active Problem List   Diagnosis     Morbid obesity (H)     Autism spectrum disorder     Moderate episode of recurrent major depressive disorder (H)     Hypothyroidism, unspecified type     YARA (generalized anxiety disorder)     Mild intellectual disability     Attention deficit hyperactivity disorder (ADHD), unspecified ADHD type     Oppositional defiant disorder     History of seizures     Lactose intolerance     Migraine without aura and without status migrainosus, not intractable     Non-seasonal allergic rhinitis due to pollen     Gastroesophageal reflux disease without esophagitis     Constipation, unspecified constipation type     Lives in group home     Polycystic ovary syndrome      Peripheral vision loss, bilateral     Impaired physical mobility     Hirsutism     Eczema     Borderline personality disorder (H)     Diabetes: No     Progress on goals from last visit: Patient reports that she continues to work on eating protein first, smaller portion sizes, and making healthy snack choices.  Patient reports that she has been eating out more frequently due to it being her birthday more recently as well as her sister's birthday and frequent travel to her mom's house.      Dietary Recall:  Breakfast: cereal with milk  Lunch:leftovers from the night  Dinner:home cooked meals-goulash, corn or salad   Typical snacks: yogurt or cottage cheese or smoothie   Eating out: 3 times/week   Beverages: Water, Crystal Light, Coffee with Creamer  Exercise: Push-ups, Sit Ups; Walking for 60 minutes (most days of the week)    Nutrition Diagnosis:    Overweight/Obesity (NC 3.3) related to overeating and poor lifestyle habits as evidenced by patient's subjective statements (excessive energy intake) and BMI of 52.3 kg/m2.     Intervention:  1. Food and/or nutrient delivery: balanced meals, adequate protein  2. Nutrition education: eating out frequency  3. Nutrition counseling: exercise regimen, praised patient for positive changes made      Monitoring/Evaluation:    Goals:  1. Work on limiting eating out to no more than 2 times/week.    2. Continue to focus on protein first at each meal along with lots of non starchy vegetables.   3. Continue to exercise most days of the week for 30-60 minutes.    Patient to follow up in 1 month(s) with RD        Video-Visit Details    Type of service:  Video Visit    Video End Time:1:52 PM    Originating Location (pt. Location): Home    Distant Location (provider location):  [facility] SURGERY [facility] AND BARIATRICS CARE [place]     Platform used for Video Visit: Blanca Saldaña RD        Again, thank you for allowing me to participate in the care of your  patient.        Sincerely,        Shima Saldaña, RD

## 2022-03-31 ENCOUNTER — DOCUMENTATION ONLY (OUTPATIENT)
Dept: LAB | Facility: CLINIC | Age: 27
End: 2022-03-31
Payer: MEDICAID

## 2022-03-31 NOTE — PROGRESS NOTES
Patient has lab appointment on 4/4/22  Lab appointment for COVID- SWAP  Please place a lab order  Thanks lab

## 2022-04-01 NOTE — PROGRESS NOTES
Call placed to patient.  Left voicemail message requesting call back clinc RN regarding lab appointment 4/4/22. Call back number given.  Genny Canales RN

## 2022-04-26 ENCOUNTER — TELEPHONE (OUTPATIENT)
Dept: FAMILY MEDICINE | Facility: CLINIC | Age: 27
End: 2022-04-26
Payer: MEDICAID

## 2022-04-26 NOTE — TELEPHONE ENCOUNTER
Attempts made to contact patient in regards to video visit that was scheduled for today with this writer at 1:30 pm.  Patient did not link onto video visit nor answer the phone with attempts made, therefore left patient a detailed message including call center contact information to call and reschedule this appointment at her earliest convenience.

## 2022-05-06 ENCOUNTER — OFFICE VISIT (OUTPATIENT)
Dept: FAMILY MEDICINE | Facility: CLINIC | Age: 27
End: 2022-05-06
Payer: MEDICAID

## 2022-05-06 VITALS
HEIGHT: 64 IN | DIASTOLIC BLOOD PRESSURE: 71 MMHG | TEMPERATURE: 98.8 F | WEIGHT: 293 LBS | SYSTOLIC BLOOD PRESSURE: 125 MMHG | HEART RATE: 65 BPM | BODY MASS INDEX: 50.02 KG/M2

## 2022-05-06 DIAGNOSIS — Z30.013 ENCOUNTER FOR INITIAL PRESCRIPTION OF INJECTABLE CONTRACEPTIVE: Primary | ICD-10-CM

## 2022-05-06 DIAGNOSIS — E03.9 HYPOTHYROIDISM, UNSPECIFIED TYPE: ICD-10-CM

## 2022-05-06 PROCEDURE — 96372 THER/PROPH/DIAG INJ SC/IM: CPT | Performed by: PHYSICIAN ASSISTANT

## 2022-05-06 PROCEDURE — 99213 OFFICE O/P EST LOW 20 MIN: CPT | Mod: 25 | Performed by: PHYSICIAN ASSISTANT

## 2022-05-06 RX ORDER — MEDROXYPROGESTERONE ACETATE 150 MG/ML
150 INJECTION, SUSPENSION INTRAMUSCULAR
Status: COMPLETED | OUTPATIENT
Start: 2022-05-06 | End: 2023-04-11

## 2022-05-06 RX ADMIN — MEDROXYPROGESTERONE ACETATE 150 MG: 150 INJECTION, SUSPENSION INTRAMUSCULAR at 14:54

## 2022-05-06 NOTE — PROGRESS NOTES
"  Assessment & Plan     ASSESSMENT/PLAN:      ICD-10-CM    1. Encounter for initial prescription of injectable contraceptive  Z30.013 medroxyPROGESTERone (DEPO-PROVERA) injection 150 mg     Patient was switched from depo to OCP to try to promote weight loss. Has been following with weight management clinic, with weight loss. Having irregular bleeding and most concerning to her/staff is having worse mood/mood swings.  We discussed various options: nuvaring, IUD, nexplanon, depo, other formulation of OCP. At this point patient elects to restart depo, she really liked having no periods and tolerated it well. We will monitor for weight gain.    Patient Instructions   Start depo provera today  Stop birth control pill in 7 days  Return for thyroid test  Return for physical in 3 months or so  Monitor for bleeding, weight gain - contact me if concerns       BMI:   Estimated body mass index is 52.68 kg/m  as calculated from the following:    Height as of this encounter: 1.626 m (5' 4\").    Weight as of this encounter: 139.2 kg (306 lb 14.4 oz).   Weight management plan: Patient referred to endocrine and/or weight management specialty    Return in about 3 months (around 8/6/2022).    ALEN Ma [facility] is a 27 year old who presents for the following health issues  accompanied by her group home caregiver.  Chief Complaint   Patient presents with     Contraception     Discuss different options, Would like to go back on the depo shot. She has been having more frequent bleeding       Contraception    History of Present Illness       Reason for visit:  Birth control  Symptom onset:  More than a month  Symptoms include:  Bleeding every week or 2  Symptom intensity:  Moderate  Symptom progression:  Staying the same  Had these symptoms before:  Yes  Has tried/received treatment for these symptoms:  Yes  Previous treatment was successful:  Yes  Prior treatment description:  " "Trying different birth control methods  What makes it worse:  No  What makes it better:  No    She eats 0-1 servings of fruits and vegetables daily.She consumes 1 sweetened beverage(s) daily.She exercises with enough effort to increase her heart rate 20 to 29 minutes per day.  She exercises with enough effort to increase her heart rate 3 or less days per week.   She is taking medications regularly.     At least a couple months of twice mnthly period  Before that every 3 months  Period starting again today: cramping, mood swings          Review of Systems   Other than noted above, general, HEENT, respiratory, cardiac, MS, and gastrointestinal systems are negative.       Objective    /71   Pulse 65   Temp 98.8  F (37.1  C) (Tympanic)   Ht 1.626 m (5' 4\")   Wt 139.2 kg (306 lb 14.4 oz)   BMI 52.68 kg/m    Body mass index is 52.68 kg/m .  Physical Exam   GENERAL: healthy, alert and no distress  RESP: lungs clear to auscultation - no rales, rhonchi or wheezes  CV: regular rate and rhythm, normal S1 S2, no S3 or S4, no murmur, click or rub, no peripheral edema and peripheral pulses strong  MS: no gross musculoskeletal defects noted, no edema  PSYCH: mentation appears normal, affect normal/bright        "

## 2022-05-06 NOTE — PATIENT INSTRUCTIONS
Start depo provera today  Stop birth control pill in 7 days  Return for thyroid test  Return for physical in 3 months or so  Monitor for bleeding, weight gain - contact me if concerns

## 2022-05-06 NOTE — PROGRESS NOTES
Clinic Administered Medication Documentation    Administrations This Visit     medroxyPROGESTERone (DEPO-PROVERA) injection 150 mg     Admin Date  05/06/2022 Action  Given Dose  150 mg Route  Intramuscular Site   Administered By  Juanita Khan CMA    Ordering Provider: Su Travis PA-C    Patient Supplied?: No    Comments: Patient restarted depo injections and DC oral BC                  Depo Provera Documentation    URINE HCG: not indicated    Depo-Provera Standing Order inclusion/exclusion criteria reviewed.   Patient meets: inclusion criteria     BP: 125/71  LAST PAP/EXAM:   Lab Results   Component Value Date    PAP NIL 03/13/2020       Prior to injection, verified patient identity using patient's name and date of birth. Medication was administered. Please see MAR and medication order for additional information.     Was entire vial of medication used? Yes  Vial/Syringe: Single dose vial  Expiration Date:  9/1/2023    Patient instructed to remain in clinic for 15 minutes and report any adverse reaction to staff immediately .  NEXT INJECTION DUE: 7/22/22 - 8/5/22

## 2022-05-12 ENCOUNTER — LAB (OUTPATIENT)
Dept: LAB | Facility: CLINIC | Age: 27
End: 2022-05-12
Payer: MEDICAID

## 2022-05-12 DIAGNOSIS — E03.9 HYPOTHYROIDISM, UNSPECIFIED TYPE: ICD-10-CM

## 2022-05-12 LAB — TSH SERPL DL<=0.005 MIU/L-ACNC: 1.7 MU/L (ref 0.4–4)

## 2022-05-12 PROCEDURE — 84443 ASSAY THYROID STIM HORMONE: CPT

## 2022-05-12 PROCEDURE — 36415 COLL VENOUS BLD VENIPUNCTURE: CPT

## 2022-05-12 RX ORDER — LEVOTHYROXINE SODIUM 75 UG/1
75 TABLET ORAL DAILY
Qty: 90 TABLET | Refills: 3 | Status: SHIPPED | OUTPATIENT
Start: 2022-05-12 | End: 2022-11-18

## 2022-05-21 DIAGNOSIS — K21.9 GASTROESOPHAGEAL REFLUX DISEASE WITHOUT ESOPHAGITIS: ICD-10-CM

## 2022-05-23 ENCOUNTER — TELEPHONE (OUTPATIENT)
Dept: FAMILY MEDICINE | Facility: CLINIC | Age: 27
End: 2022-05-23

## 2022-05-23 RX ORDER — FAMOTIDINE 20 MG/1
TABLET, FILM COATED ORAL
Qty: 28 TABLET | Refills: 2 | Status: SHIPPED | OUTPATIENT
Start: 2022-05-23 | End: 2022-08-17

## 2022-05-31 ENCOUNTER — VIRTUAL VISIT (OUTPATIENT)
Dept: SURGERY | Facility: CLINIC | Age: 27
End: 2022-05-31
Payer: MEDICAID

## 2022-05-31 DIAGNOSIS — Z71.3 NUTRITIONAL COUNSELING: ICD-10-CM

## 2022-05-31 DIAGNOSIS — E66.813 CLASS 3 SEVERE OBESITY DUE TO EXCESS CALORIES WITH SERIOUS COMORBIDITY AND BODY MASS INDEX (BMI) OF 50.0 TO 59.9 IN ADULT (H): ICD-10-CM

## 2022-05-31 DIAGNOSIS — E66.01 CLASS 3 SEVERE OBESITY DUE TO EXCESS CALORIES WITH SERIOUS COMORBIDITY AND BODY MASS INDEX (BMI) OF 50.0 TO 59.9 IN ADULT (H): ICD-10-CM

## 2022-05-31 DIAGNOSIS — E78.5 DYSLIPIDEMIA: ICD-10-CM

## 2022-05-31 DIAGNOSIS — K21.9 GASTROESOPHAGEAL REFLUX DISEASE WITHOUT ESOPHAGITIS: Primary | ICD-10-CM

## 2022-05-31 PROCEDURE — 97803 MED NUTRITION INDIV SUBSEQ: CPT | Mod: 95 | Performed by: DIETITIAN, REGISTERED

## 2022-05-31 NOTE — LETTER
5/31/2022         RE: Jane Lee  [address] [id number]        Dear Colleague,    Thank you for referring your patient, Jane Lee, to the [facility] CL[facility]IC AND BARIATRICS CARE [place]. Please see a copy of my visit note below.    Jane Lee is a 27 year old who is being evaluated via a billable video visit.      How would you like to obtain your AVS? MyChart  If the video visit is dropped, the invitation should be resent by: Send to e-mail at: [email or web address]  Will anyone else be joining your video visit? No      Video Start Time: 11:31 AM      Medical  Weight Loss Follow-Up Diet Evaluation  Assessment:  Jane is presenting today for a follow up weight management nutrition consultation. Pt has had an initial appointment with Dr. Marcano.  Weight loss medication: None.   Pt's weight is 295 lbs   Initial weight: 360 lbs   Weight change: 65 lbs (down)     No flowsheet data found.  BMI: There is no height or weight on file to calculate BMI.  Ideal body weight: 54.7 kg (120 lb 9.5 oz)  Adjusted ideal body weight: 88.5 kg (195 lb 1.8 oz)    Estimated RMR ([place]-St Jeor equation):   2070 kcals x 1.3 (light active) = 2691 kcals (for weight maintenance)     Recommended Protein Intake: 60-80 grams of protein/day  Patient Active Problem List:  Patient Active Problem List   Diagnosis     Morbid obesity (H)     Autism spectrum disorder     Moderate episode of recurrent major depressive disorder (H)     Hypothyroidism, unspecified type     YARA (generalized anxiety disorder)     Mild intellectual disability     Attention deficit hyperactivity disorder (ADHD), unspecified ADHD type     Oppositional defiant disorder     History of seizures     Lactose intolerance     Migraine without aura and without status migrainosus, not intractable     Non-seasonal allergic rhinitis due to pollen     Gastroesophageal reflux disease without esophagitis     Constipation, unspecified  constipation type     Lives in group home     Polycystic ovary syndrome     Peripheral vision loss, bilateral     Impaired physical mobility     Hirsutism     Eczema     Borderline personality disorder (H)     Diabetes: No     Progress on goals from last visit: Continues to work on smaller portion sizes at meals, focusing on her protein first along with trying to incorporate more vegetables into the diet.      Typical snacks: donuts on occasion, banana, ice cream, yogurt, salad, fruit snacks, PB and crackers  Eating out: 2 times/week (trying to make healthy choices)  Beverages: Coffee with SF Syrup/Creamer, Water, Crystal Light, 1 cup of Milk    Exercise: Walking, Sit Ups, Push Ups aiming for daily for at least  minutes    Nutrition Diagnosis:    Overweight/Obesity (NC 3.3) related to overeating and poor lifestyle habits as evidenced by patient's subjective statements (excessive energy intake) and BMI of 50.7 kg/m2.       Intervention:  1. Food and/or nutrient delivery: balanced meals, adequate protein   2. Nutrition education: Quick and Easy Meal Ideas, Calorie Recommendations    3. Nutrition counseling: praised patient for positive changes made    Monitoring/Evaluation:    Goals:  1. Continue to focus on protein first at each meal, aiming for 60-80 grams of protein/day.   2. Use plate method for portion control (1/2 vegetable, 1/4 protein, 1/4 starch).     Patient to follow up in 2 month(s) with MARCELINA      Video-Visit Details    Type of service:  Video Visit    Video End Time:11:50 AM    Originating Location (pt. Location): Home    Distant Location (provider location):  [facility] SURGERY CLINIC AND BARIATRICS CARE [place]     Platform used for Video Visit: Xin Saldaña RD        Again, thank you for allowing me to participate in the care of your patient.        Sincerely,        Shima Saldaña RD

## 2022-05-31 NOTE — PROGRESS NOTES
Jane Lee is a 27 year old who is being evaluated via a billable video visit.      How would you like to obtain your AVS? MyChart  If the video visit is dropped, the invitation should be resent by: Send to e-mail at: [email or web address]  Will anyone else be joining your video visit? No      Video Start Time: 11:31 AM      Medical  Weight Loss Follow-Up Diet Evaluation  Assessment:  Jane is presenting today for a follow up weight management nutrition consultation. Pt has had an initial appointment with Dr. Marcano.  Weight loss medication: None.   Pt's weight is 295 lbs   Initial weight: 360 lbs   Weight change: 65 lbs (down)     No flowsheet data found.  BMI: There is no height or weight on file to calculate BMI.  Ideal body weight: 54.7 kg (120 lb 9.5 oz)  Adjusted ideal body weight: 88.5 kg (195 lb 1.8 oz)    Estimated RMR ([place]-St Jeor equation):   2070 kcals x 1.3 (light active) = 2691 kcals (for weight maintenance)     Recommended Protein Intake: 60-80 grams of protein/day  Patient Active Problem List:  Patient Active Problem List   Diagnosis     Morbid obesity (H)     Autism spectrum disorder     Moderate episode of recurrent major depressive disorder (H)     Hypothyroidism, unspecified type     YARA (generalized anxiety disorder)     Mild intellectual disability     Attention deficit hyperactivity disorder (ADHD), unspecified ADHD type     Oppositional defiant disorder     History of seizures     Lactose intolerance     Migraine without aura and without status migrainosus, not intractable     Non-seasonal allergic rhinitis due to pollen     Gastroesophageal reflux disease without esophagitis     Constipation, unspecified constipation type     Lives in group home     Polycystic ovary syndrome     Peripheral vision loss, bilateral     Impaired physical mobility     Hirsutism     Eczema     Borderline personality disorder (H)     Diabetes: No     Progress on goals from last visit: Continues to work on  smaller portion sizes at meals, focusing on her protein first along with trying to incorporate more vegetables into the diet.      Typical snacks: donuts on occasion, banana, ice cream, yogurt, salad, fruit snacks, PB and crackers  Eating out: 2 times/week (trying to make healthy choices)  Beverages: Coffee with SF Syrup/Creamer, Water, Crystal Light, 1 cup of Milk    Exercise: Walking, Sit Ups, Push Ups aiming for daily for at least  minutes    Nutrition Diagnosis:    Overweight/Obesity (NC 3.3) related to overeating and poor lifestyle habits as evidenced by patient's subjective statements (excessive energy intake) and BMI of 50.7 kg/m2.       Intervention:  1. Food and/or nutrient delivery: balanced meals, adequate protein   2. Nutrition education: Quick and Easy Meal Ideas, Calorie Recommendations    3. Nutrition counseling: praised patient for positive changes made    Monitoring/Evaluation:    Goals:  1. Continue to focus on protein first at each meal, aiming for 60-80 grams of protein/day.   2. Use plate method for portion control (1/2 vegetable, 1/4 protein, 1/4 starch).     Patient to follow up in 2 month(s) with MARCELINA      Video-Visit Details    Type of service:  Video Visit    Video End Time:11:50 AM    Originating Location (pt. Location): Home    Distant Location (provider location):  [facility] SURGERY CLINIC AND BARIATRICS CARE [place]     Platform used for Video Visit: Xin Saldaña RD

## 2022-06-07 ENCOUNTER — VIRTUAL VISIT (OUTPATIENT)
Dept: FAMILY MEDICINE | Facility: CLINIC | Age: 27
End: 2022-06-07
Payer: MEDICAID

## 2022-06-07 VITALS — WEIGHT: 293 LBS | BODY MASS INDEX: 50.02 KG/M2 | HEIGHT: 64 IN

## 2022-06-07 DIAGNOSIS — R30.0 DYSURIA: Primary | ICD-10-CM

## 2022-06-07 DIAGNOSIS — B96.89 BACTERIAL VAGINITIS: ICD-10-CM

## 2022-06-07 DIAGNOSIS — N76.0 BACTERIAL VAGINITIS: ICD-10-CM

## 2022-06-07 DIAGNOSIS — N89.8 VAGINAL DISCHARGE: ICD-10-CM

## 2022-06-07 PROCEDURE — 99213 OFFICE O/P EST LOW 20 MIN: CPT | Mod: 95 | Performed by: NURSE PRACTITIONER

## 2022-06-07 ASSESSMENT — ENCOUNTER SYMPTOMS
GASTROINTESTINAL NEGATIVE: 1
FLANK PAIN: 0
DIFFICULTY URINATING: 1
FEVER: 0
DYSURIA: 1

## 2022-06-07 NOTE — PROGRESS NOTES
Jane is a 27 year old who is being evaluated via a billable telephone visit.      What phone number would you like to be contacted at? [phone]  How would you like to obtain your AVS? MyChart    Assessment & Plan   Addendum  Bacterial Vaginitis  Wet prep was positive for clue cells  Will treat for Metronidazole BID x 7 days  Left a nonspecific VM on her messages. Will try to contact her via [employer] or another phone call.     Dysuria  We had difficulty during the telephone visit; initially she was unable to hear me because of the noise in her environment (loud music playing in the background). I felt it was important to obtain labs of UA and wet prep. Patient is going to figure out how to get to a [facility] lab today. She is in a group home and needs to figure out logistics of getting a ride to the clinic. I will rule out UTI/bacterial, gonorrhea, chlamydia then I would recommend she follow up with her PCP for further evaluation or referrals as needed.   - UA Macro with Reflex to Micro and Culture - lab collect; Future    Vaginal discharge  Gonorrhea/chlamydia PCR  - Wet prep - Clinic Collect      Return in about 1 week (around 6/14/2022) for Follow up.    STEPHEN Gonzales CNP  M [facility] is a 27 year old who presents for the following health issues     HPI     Genitourinary - Female  Onset/Duration: x 2 month  Description:   Painful urination (Dysuria): YES           Frequency: YES  Blood in urine (Hematuria): no  Delay in urine (Hesitency): YES  Intensity: moderate, severe  Progression of Symptoms:  worsening and constant  Accompanying Signs & Symptoms:  Fever/chills: no  Flank pain: no  Nausea and vomiting: no  Vaginal symptoms: discharge and itching  Abdominal/Pelvic Pain: no  History:   History of frequent UTI s: YES  History of kidney stones: no  Sexually Active: no  Possibility of pregnancy: No  Precipitating or alleviating factors: showering, soap for her  genitals  Therapies tried and outcome: Increase fluid intake      Urinary urgency and incontinence, dysuria. Vaginal discharge, vaginal itching. No flank pain, nausea, vomiting, fevers.     Review of Systems   Constitutional: Negative for fever.   Gastrointestinal: Negative.    Genitourinary: Positive for difficulty urinating, dysuria, urgency and vaginal discharge. Negative for flank pain.        Incontinence episodes, and vaginal itching.             Objective           Vitals:  No vitals were obtained today due to virtual visit.    Physical Exam   healthy, alert and no distress  PSYCH: Alert and oriented times 3; coherent speech, normal   rate and volume, able to articulate logical thoughts, able   to abstract reason, no tangential thoughts, no hallucinations   or delusions  Her affect is normal  RESP: No cough, no audible wheezing, able to talk in full sentences  Remainder of exam unable to be completed due to telephone visits      Time in: 1:07pm  Time out: 1:14pm  Phone call duration: 7 minutes

## 2022-06-08 ENCOUNTER — LAB (OUTPATIENT)
Dept: LAB | Facility: CLINIC | Age: 27
End: 2022-06-08
Payer: MEDICAID

## 2022-06-08 DIAGNOSIS — R30.0 DYSURIA: ICD-10-CM

## 2022-06-08 DIAGNOSIS — N89.8 VAGINAL DISCHARGE: ICD-10-CM

## 2022-06-08 LAB
ALBUMIN UR-MCNC: NEGATIVE MG/DL
APPEARANCE UR: CLEAR
BILIRUB UR QL STRIP: NEGATIVE
CLUE CELLS: PRESENT
COLOR UR AUTO: YELLOW
GLUCOSE UR STRIP-MCNC: NEGATIVE MG/DL
HGB UR QL STRIP: NEGATIVE
KETONES UR STRIP-MCNC: NEGATIVE MG/DL
LEUKOCYTE ESTERASE UR QL STRIP: ABNORMAL
NITRATE UR QL: NEGATIVE
PH UR STRIP: 7 [PH] (ref 5–7)
RBC #/AREA URNS AUTO: ABNORMAL /HPF
SP GR UR STRIP: 1.01 (ref 1–1.03)
SQUAMOUS #/AREA URNS AUTO: ABNORMAL /LPF
TRICHOMONAS, WET PREP: ABNORMAL
UROBILINOGEN UR STRIP-ACNC: 0.2 E.U./DL
WBC #/AREA URNS AUTO: ABNORMAL /HPF
WBC'S/HIGH POWER FIELD, WET PREP: ABNORMAL
YEAST, WET PREP: ABNORMAL

## 2022-06-08 PROCEDURE — 87210 SMEAR WET MOUNT SALINE/INK: CPT | Performed by: NURSE PRACTITIONER

## 2022-06-08 PROCEDURE — 81001 URINALYSIS AUTO W/SCOPE: CPT

## 2022-06-08 PROCEDURE — 87591 N.GONORRHOEAE DNA AMP PROB: CPT

## 2022-06-08 PROCEDURE — 87491 CHLMYD TRACH DNA AMP PROBE: CPT

## 2022-06-08 RX ORDER — METRONIDAZOLE 500 MG/1
500 TABLET ORAL 2 TIMES DAILY
Qty: 14 TABLET | Refills: 0 | Status: SHIPPED | OUTPATIENT
Start: 2022-06-08 | End: 2022-06-15

## 2022-06-09 LAB
C TRACH DNA SPEC QL NAA+PROBE: NEGATIVE
N GONORRHOEA DNA SPEC QL NAA+PROBE: NEGATIVE

## 2022-06-29 ENCOUNTER — VIRTUAL VISIT (OUTPATIENT)
Dept: FAMILY MEDICINE | Facility: CLINIC | Age: 27
End: 2022-06-29
Payer: MEDICAID

## 2022-06-29 DIAGNOSIS — R30.0 DYSURIA: ICD-10-CM

## 2022-06-29 DIAGNOSIS — F33.1 MODERATE EPISODE OF RECURRENT MAJOR DEPRESSIVE DISORDER (H): ICD-10-CM

## 2022-06-29 DIAGNOSIS — R21 RASH AND NONSPECIFIC SKIN ERUPTION: ICD-10-CM

## 2022-06-29 DIAGNOSIS — K62.5 BRBPR (BRIGHT RED BLOOD PER RECTUM): ICD-10-CM

## 2022-06-29 DIAGNOSIS — E66.01 MORBID OBESITY (H): ICD-10-CM

## 2022-06-29 DIAGNOSIS — N89.8 VAGINAL IRRITATION: Primary | ICD-10-CM

## 2022-06-29 PROCEDURE — 99213 OFFICE O/P EST LOW 20 MIN: CPT | Mod: 95 | Performed by: PHYSICIAN ASSISTANT

## 2022-06-29 RX ORDER — MICONAZOLE NITRATE 20 MG/G
CREAM TOPICAL 2 TIMES DAILY PRN
Qty: 141.7 G | Refills: 1 | Status: SHIPPED | OUTPATIENT
Start: 2022-06-29 | End: 2023-08-25

## 2022-06-29 RX ORDER — TRIAMCINOLONE ACETONIDE 1 MG/G
CREAM TOPICAL 2 TIMES DAILY
Qty: 45 G | Refills: 0 | Status: SHIPPED | OUTPATIENT
Start: 2022-06-29 | End: 2023-08-25

## 2022-06-29 NOTE — PROGRESS NOTES
Jane is a 27 year old who is being evaluated via a billable video visit.      How would you like to obtain your AVS? MyChart  If the video visit is dropped, the invitation should be resent by: Text to cell phone: [phone]  Will anyone else be joining your video visit? No          Assessment & Plan     ASSESSMENT/PLAN:      ICD-10-CM    1. Vaginal irritation  N89.8 miconazole (MICATIN) 2 % external cream   2. Dysuria  R30.0 UA with Microscopic reflex to Culture - lab collect     Wet prep - Clinic Collect   3. Rash and nonspecific skin eruption  R21 triamcinolone (KENALOG) 0.1 % external cream   4. Moderate episode of recurrent major depressive disorder (H)  F33.1    5. Morbid obesity (H)  E66.01    6. BRBPR (bright red blood per rectum)  K62.5      Set up for lab only appointment.  Discussed hygiene. Patient has difficulty wiping herself fully, suspect this is contributing to issues. Discussed wipes, Bidet, asking for assistance. Recommended do not use feminine wash. Caregiver requests miconazole which they are able to use PRN with an order.  Did discuss possible urology/gyn referral if needed.    Discussed possible hemorrhoid? Will monitor for now.    Rash on back of neck: dermatitis? Will trial triamcinolone PRN.    Follow up in clinic if not improving.    Obesity: is losing weight. Discussed activity level. Follows with weight management clinic.  Depression: follows with psychiatry. No medication changes.    Return in about 2 weeks (around 7/13/2022) for if not improving or if worsening.    ALEN Ma [facility] is a 27 year old accompanied by her caregiver., presenting for the following health issues:  UTI      HPI     * urinary/vaginal symptoms  Did not improve with metronidazole a few weeks ago    Accidents, dysuria, frequency, urine odor  She has itching, discharge.   She took metronidazole  She chronically has issues with accidents, uses depends.  More frequent lately.  No constipation, looser stools yesterday x2. No fevers. No abdominal pain today, had some stomach pain yesterday. She had some blood in the toilet - BRB, showed a caregiver, had small amount while wiping  Has started drinking coffee    Her caregiver tells me she often has these same symptoms    Has a rash - itchy bumps on back of neck  No new soaps, lotions. Wears camera every day - has a new lanyard for camera.    Would like monistat PRN for itch, has chronic issues  Has had issues with hygiene in the past  Showers mostly every day, uses a feminine wash        Review of Systems   Other than noted above, general, HEENT, respiratory, cardiac, MS, and gastrointestinal systems are negative.       Objective           Vitals:  No vitals were obtained today due to virtual visit.    Physical Exam   GENERAL: Healthy, alert and no distress  EYES: Eyes grossly normal to inspection.  No discharge or erythema, or obvious scleral/conjunctival abnormalities.  RESP: No audible wheeze, cough, or visible cyanosis.  No visible retractions or increased work of breathing.    SKIN: Visible skin clear. No significant rash, abnormal pigmentation or lesions.  NEURO: Cranial nerves grossly intact.  Mentation and speech appropriate for age.  PSYCH: Mentation appears normal, affect normal/bright, judgement and insight intact, normal speech and appearance well-groomed.        Video-Visit Details    Video Start Time: 8:03 AM    Type of service:  Video Visit    Video End Time:8:30 AM    Originating Location (pt. Location): Home    Distant Location (provider location):  [facility]     Platform used for Video Visit: [employer]  Chuck

## 2022-06-29 NOTE — PROGRESS NOTES
"Jane is a 27 year old who is being evaluated via a billable video visit.      How would you like to obtain your AVS? {AVS Preference:271146}  If the video visit is dropped, the invitation should be resent by: {video visit invitation (Optional) :372793}  Will anyone else be joining your video visit? {:356803}  {If patient encounters technical issues they should call [phone] :563484}        {PROVIDER CHARTING PREFERENCE:998195}    Subjective   Jane is a 27 year old{ACCOMPANIED BY STATEMENT (Optional):889661}, presenting for the following health issues:  No chief complaint on file.      HPI     Genitourinary - Female  Onset/Duration: ***  Description:   Painful urination (Dysuria): {.:532406::\"no\"}           Frequency: {.:447313::\"no\"}  Blood in urine (Hematuria): {.:719557::\"no\"}  Delay in urine (Hesitency): {.:300571::\"no\"}  Intensity: {.:999938}  Progression of Symptoms:  {.:679259}  Accompanying Signs & Symptoms:  Fever/chills: {.:629735::\"no\"}  Flank pain: {.:156498::\"no\"}  Nausea and vomiting: {.:627917::\"no\"}  Vaginal symptoms: {VAGINAL SYMPTOMS:541342::\"none\"}  Abdominal/Pelvic Pain: {.:579376::\"no\"}  History:   History of frequent UTI s: {.:737849::\"no\"}  History of kidney stones: {.:809619::\"no\"}  Sexually Active: {.:111082::\"no\"}  Possibility of pregnancy: { :064867::\"No\"}  Precipitating or alleviating factors: {NONE DEFAULTED:463224::\"None\"}  Therapies tried and outcome: {UTI sx treat:407635::\" *** \"}    {additonal problems for provider to add (Optional):163141}    Review of Systems   {ROS COMP (Optional):602169}      Objective           Vitals:  No vitals were obtained today due to virtual visit.    Physical Exam   {video visit exam brief selected:379764::\"GENERAL: Healthy, alert and no distress\",\"EYES: Eyes grossly normal to inspection.  No discharge or erythema, or obvious scleral/conjunctival abnormalities.\",\"RESP: No audible wheeze, cough, or visible cyanosis.  No visible retractions or " "increased work of breathing.  \",\"SKIN: Visible skin clear. No significant rash, abnormal pigmentation or lesions.\",\"NEURO: Cranial nerves grossly intact.  Mentation and speech appropriate for age.\",\"PSYCH: Mentation appears normal, affect normal/bright, judgement and insight intact, normal speech and appearance well-groomed.\"}    {Diagnostic Test Results (Optional):197962}    {AMBULATORY ATTESTATION (Optional):411143}        Video-Visit Details    Video Start Time: {video visit start/end time for provider to select:712083}    Type of service:  Video Visit    Video End Time:{video visit start/end time for provider to select:230992}    Originating Location (pt. Location): {video visit patient location:962595::\"Home\"}    Distant Location (provider location):  [facility]     Platform used for Video Visit: {Virtual Visit Platforms:359184::\"[employer]"}    .  ..  "

## 2022-06-30 ENCOUNTER — LAB (OUTPATIENT)
Dept: LAB | Facility: CLINIC | Age: 27
End: 2022-06-30
Payer: MEDICAID

## 2022-06-30 DIAGNOSIS — R30.0 DYSURIA: ICD-10-CM

## 2022-06-30 PROBLEM — R56.9 SEIZURES (H): Status: ACTIVE | Noted: 2022-06-30

## 2022-06-30 LAB
ALBUMIN UR-MCNC: NEGATIVE MG/DL
APPEARANCE UR: CLEAR
BILIRUB UR QL STRIP: NEGATIVE
CLUE CELLS: ABNORMAL
COLOR UR AUTO: YELLOW
GLUCOSE UR STRIP-MCNC: NEGATIVE MG/DL
HGB UR QL STRIP: NEGATIVE
KETONES UR STRIP-MCNC: NEGATIVE MG/DL
LEUKOCYTE ESTERASE UR QL STRIP: ABNORMAL
NITRATE UR QL: NEGATIVE
PH UR STRIP: 7 [PH] (ref 5–7)
RBC #/AREA URNS AUTO: NORMAL /HPF
SP GR UR STRIP: 1.01 (ref 1–1.03)
TRICHOMONAS, WET PREP: ABNORMAL
UROBILINOGEN UR STRIP-ACNC: 0.2 E.U./DL
WBC #/AREA URNS AUTO: NORMAL /HPF
WBC'S/HIGH POWER FIELD, WET PREP: ABNORMAL
YEAST, WET PREP: ABNORMAL

## 2022-06-30 PROCEDURE — 81001 URINALYSIS AUTO W/SCOPE: CPT

## 2022-06-30 PROCEDURE — 87210 SMEAR WET MOUNT SALINE/INK: CPT

## 2022-07-12 ENCOUNTER — VIRTUAL VISIT (OUTPATIENT)
Dept: SURGERY | Facility: CLINIC | Age: 27
End: 2022-07-12
Payer: MEDICAID

## 2022-07-12 DIAGNOSIS — E66.01 CLASS 3 SEVERE OBESITY DUE TO EXCESS CALORIES WITH SERIOUS COMORBIDITY AND BODY MASS INDEX (BMI) OF 50.0 TO 59.9 IN ADULT (H): ICD-10-CM

## 2022-07-12 DIAGNOSIS — Z71.3 NUTRITIONAL COUNSELING: ICD-10-CM

## 2022-07-12 DIAGNOSIS — E78.5 DYSLIPIDEMIA: ICD-10-CM

## 2022-07-12 DIAGNOSIS — K21.9 GASTROESOPHAGEAL REFLUX DISEASE WITHOUT ESOPHAGITIS: Primary | ICD-10-CM

## 2022-07-12 DIAGNOSIS — E66.813 CLASS 3 SEVERE OBESITY DUE TO EXCESS CALORIES WITH SERIOUS COMORBIDITY AND BODY MASS INDEX (BMI) OF 50.0 TO 59.9 IN ADULT (H): ICD-10-CM

## 2022-07-12 PROCEDURE — 97803 MED NUTRITION INDIV SUBSEQ: CPT | Mod: 95 | Performed by: DIETITIAN, REGISTERED

## 2022-07-12 NOTE — LETTER
7/12/2022         RE: Jane Lee  [id number] [id number]        Dear Colleague,    Thank you for referring your patient, Jane Lee, to the [facility] AND BARIATRICS CARE [place]. Please see a copy of my visit note below.    Jane Lee is a 27 year old who is being evaluated via a billable video visit.    How would you like to obtain your AVS? MyChart  If the video visit is dropped, the invitation should be resent by: Send to e-mail at: [email or web address]  Will anyone else be joining your video visit? No      Video Start Time: 11:29 AM      Medical  Weight Loss Follow-Up Diet Evaluation  Assessment:  Jane is presenting today for a follow up weight management nutrition consultation. Pt has had an initial appointment with Dr. Marcano.  Weight loss medication: None .   Pt's weight is 303 lbs   Initial weight: 360 lbs   Weight change: 57 lbs (down)     No flowsheet data found.  BMI: There is no height or weight on file to calculate BMI.  Ideal body weight: 54.7 kg (120 lb 9.5 oz)  Adjusted ideal body weight: 86.9 kg (191 lb 8.9 oz)    Estimated RMR ([place]-St Jeor equation):   2106 kcals x 1.3 (light active) = 2738 kcals (for weight maintenance)     Recommended Protein Intake: 60-80 grams of protein/day  Patient Active Problem List:  Patient Active Problem List   Diagnosis     Morbid obesity (H)     Autism spectrum disorder     Moderate episode of recurrent major depressive disorder (H)     Hypothyroidism, unspecified type     YARA (generalized anxiety disorder)     Mild intellectual disability     Attention deficit hyperactivity disorder (ADHD), unspecified ADHD type     Oppositional defiant disorder     History of seizures     Lactose intolerance     Migraine without aura and without status migrainosus, not intractable     Non-seasonal allergic rhinitis due to pollen     Gastroesophageal reflux disease without esophagitis     Constipation, unspecified  constipation type     Lives in group home     Polycystic ovary syndrome     Peripheral vision loss, bilateral     Impaired physical mobility     Hirsutism     Eczema     Borderline personality disorder (H)     Seizures (H)     Diabetes: No     Progress on goals from last visit: Did note that she has been eating more fast food and high sugar containing foods, especially over the holidays.  Patient reports that she been eating larger portion sizes as well.  Patient reports that more recently she is trying to stay within 2000 calories/day to help promote weight loss since falling off track, noting that the group home she resides in is working with her on achieving this and setting a goal of 30 days of healthy eating with a reward of 1 night/month of Chinese food if she adheres to it.  Patient reports that she is trying to eat her protein first along with lots of non starchy vegetables.      Eating out: 2 times/week or less   Beverages: Water-has gone down recently, trying to incorporate Crystal Light, Coffee with SF Flavoring   Exercise: 2-3 times/week for Walking, Sit Ups, and Push Ups for 60 minutes +     Nutrition Diagnosis:    Overweight/Obesity (NC 3.3) related to overeating and poor lifestyle habits as evidenced by patient's subjective statements and BMI 52.1 kg/m2.  Intervention:  1. Food and/or nutrient delivery: balanced meals, adequate protein   2. Nutrition education: Quick and Easy Meals, Snack Ideas   3. Nutrition counseling: exercise regimen     Monitoring/Evaluation:    Goals:  1. Focus on protein first along with non starchy vegetables at all meals.   2. Continue to work on reducing high sugar snack foods.   3. Re-establish an exercise regimen of 5 days/week for at least 60 minutes.     Patient to follow up in 1 month(s) with RD      Video-Visit Details    Type of service:  Video Visit    Video End Time:11:55 AM    Originating Location (pt. Location): Home    Distant Location (provider location):    [facility] SURGERY CLINIC AND BARIATRICS CARE [place]     Platform used for Video Visit: Xin Saldaña RD        Again, thank you for allowing me to participate in the care of your patient.        Sincerely,        Shima Saldaña RD

## 2022-07-12 NOTE — PROGRESS NOTES
Jane Lee is a 27 year old who is being evaluated via a billable video visit.    How would you like to obtain your AVS? MyChart  If the video visit is dropped, the invitation should be resent by: Send to e-mail at: [email or web address]  Will anyone else be joining your video visit? No      Video Start Time: 11:29 AM      Medical  Weight Loss Follow-Up Diet Evaluation  Assessment:  Jane is presenting today for a follow up weight management nutrition consultation. Pt has had an initial appointment with Dr. Marcano.  Weight loss medication: None .   Pt's weight is 303 lbs   Initial weight: 360 lbs   Weight change: 57 lbs (down)     No flowsheet data found.  BMI: There is no height or weight on file to calculate BMI.  Ideal body weight: 54.7 kg (120 lb 9.5 oz)  Adjusted ideal body weight: 86.9 kg (191 lb 8.9 oz)    Estimated RMR ([place]-St Jeor equation):   2106 kcals x 1.3 (light active) = 2738 kcals (for weight maintenance)     Recommended Protein Intake: 60-80 grams of protein/day  Patient Active Problem List:  Patient Active Problem List   Diagnosis     Morbid obesity (H)     Autism spectrum disorder     Moderate episode of recurrent major depressive disorder (H)     Hypothyroidism, unspecified type     YARA (generalized anxiety disorder)     Mild intellectual disability     Attention deficit hyperactivity disorder (ADHD), unspecified ADHD type     Oppositional defiant disorder     History of seizures     Lactose intolerance     Migraine without aura and without status migrainosus, not intractable     Non-seasonal allergic rhinitis due to pollen     Gastroesophageal reflux disease without esophagitis     Constipation, unspecified constipation type     Lives in group home     Polycystic ovary syndrome     Peripheral vision loss, bilateral     Impaired physical mobility     Hirsutism     Eczema     Borderline personality disorder (H)     Seizures (H)     Diabetes: No     Progress on goals from last visit: Did note  that she has been eating more fast food and high sugar containing foods, especially over the holidays.  Patient reports that she been eating larger portion sizes as well.  Patient reports that more recently she is trying to stay within 2000 calories/day to help promote weight loss since falling off track, noting that the group home she resides in is working with her on achieving this and setting a goal of 30 days of healthy eating with a reward of 1 night/month of Chinese food if she adheres to it.  Patient reports that she is trying to eat her protein first along with lots of non starchy vegetables.      Eating out: 2 times/week or less   Beverages: Water-has gone down recently, trying to incorporate Crystal Light, Coffee with SF Flavoring   Exercise: 2-3 times/week for Walking, Sit Ups, and Push Ups for 60 minutes +     Nutrition Diagnosis:    Overweight/Obesity (NC 3.3) related to overeating and poor lifestyle habits as evidenced by patient's subjective statements and BMI 52.1 kg/m2.  Intervention:  1. Food and/or nutrient delivery: balanced meals, adequate protein   2. Nutrition education: Quick and Easy Meals, Snack Ideas   3. Nutrition counseling: exercise regimen     Monitoring/Evaluation:    Goals:  1. Focus on protein first along with non starchy vegetables at all meals.   2. Continue to work on reducing high sugar snack foods.   3. Re-establish an exercise regimen of 5 days/week for at least 60 minutes.     Patient to follow up in 1 month(s) with RD      Video-Visit Details    Type of service:  Video Visit    Video End Time:11:55 AM    Originating Location (pt. Location): Home    Distant Location (provider location):  [facility] SURGERY [facility] AND BARIATRICS CARE [place]     Platform used for Video Visit: Xin Saldaña RD

## 2022-07-16 ENCOUNTER — HEALTH MAINTENANCE LETTER (OUTPATIENT)
Age: 27
End: 2022-07-16

## 2022-08-12 ENCOUNTER — TELEPHONE (OUTPATIENT)
Dept: FAMILY MEDICINE | Facility: CLINIC | Age: 27
End: 2022-08-12

## 2022-08-12 RX ORDER — HYDROGEN PEROXIDE 2.65 ML/100ML
LIQUID TOPICAL
COMMUNITY
Start: 2022-06-29 | End: 2023-08-25

## 2022-08-12 NOTE — TELEPHONE ENCOUNTER
Patient's call transferred to author    Patient states she is on a caffeine restriction of 2 cups of coffee in the morning and no caffeine after noon   Patient states a provider at [facility] in [place] initiated the caffeine restriction     Patient initially states she is unsure why the restriction was placed - later states she has an obsession with caffeine     Patient states she does not want to be on the restriction anymore   States she wants to drink caffeine when she wants to     Will forward to [place] to review    Jose Miguel Hurtado RN

## 2022-08-12 NOTE — TELEPHONE ENCOUNTER
Patient called back asking if her PCP has reviewed her request to discontinue the caffeine restriction.  Initial call was 40 min ago, provider is seeing patients and we will reach out to the patient as soon as PCP responds.  Patient verbalized understanding and agrees.  Genny Canales RN

## 2022-08-12 NOTE — TELEPHONE ENCOUNTER
I called and spoke to patient.  She wants to be responsible to decide her own caffeine. She wants the independence. She would like 2-3 cups coffee daily and then decaf as well. Her limit right now is 2 caffeine/2 decaf now.  This has been her restriction since she was living in [place], doesn't remember who recommended it. We discussed the risks of caffeine intake.    Needs to schedule sleep study: [phone]  Saw sleep clinic 7/2021, has rescheduled sleep study.    I spoke to caregiver- she states a certain worker schedules all the appointments and she will talk to her. She also states that the caffeine restriction is in place from her guardian and they will need to speak with the guardian to remove it.    They will call back to schedule depo and preventive \  Katelyn Travis PA-C

## 2022-08-16 ENCOUNTER — VIRTUAL VISIT (OUTPATIENT)
Dept: SURGERY | Facility: CLINIC | Age: 27
End: 2022-08-16
Payer: MEDICAID

## 2022-08-16 DIAGNOSIS — E66.813 CLASS 3 SEVERE OBESITY DUE TO EXCESS CALORIES WITH SERIOUS COMORBIDITY AND BODY MASS INDEX (BMI) OF 50.0 TO 59.9 IN ADULT (H): ICD-10-CM

## 2022-08-16 DIAGNOSIS — K21.9 GASTROESOPHAGEAL REFLUX DISEASE WITHOUT ESOPHAGITIS: Primary | ICD-10-CM

## 2022-08-16 DIAGNOSIS — E78.5 DYSLIPIDEMIA: ICD-10-CM

## 2022-08-16 DIAGNOSIS — Z71.3 NUTRITIONAL COUNSELING: ICD-10-CM

## 2022-08-16 DIAGNOSIS — E66.01 CLASS 3 SEVERE OBESITY DUE TO EXCESS CALORIES WITH SERIOUS COMORBIDITY AND BODY MASS INDEX (BMI) OF 50.0 TO 59.9 IN ADULT (H): ICD-10-CM

## 2022-08-16 PROCEDURE — 97803 MED NUTRITION INDIV SUBSEQ: CPT | Mod: 95 | Performed by: DIETITIAN, REGISTERED

## 2022-08-16 NOTE — LETTER
8/16/2022         RE: Jane Lee  [id number] [id number]        Dear Colleague,    Thank you for referring your patient, Jane Lee, to the [facility] SURGERY CLINIC AND BARIATRICS CARE [place]. Please see a copy of my visit note below.    Jane Lee is a 27 year old who is being evaluated via a billable video visit.      How would you like to obtain your AVS? MyChart  If the video visit is dropped, the invitation should be resent by: Send to e-mail at: [email or web address]  Will anyone else be joining your video visit? No      Video Start Time: 11:28 AM      Medical  Weight Loss Follow-Up Diet Evaluation  Assessment:  Jane is presenting today for a follow up weight management nutrition consultation. Pt has had an initial appointment with Dr. Marcano.   Weight loss medication: None .   Pt's weight is 300 lbs   Initial weight: 360 lbs   Weight change: 60 lbs (down)     No flowsheet data found.  BMI: There is no height or weight on file to calculate BMI.  Ideal body weight: 54.7 kg (120 lb 9.5 oz)  Adjusted ideal body weight: 86.9 kg (191 lb 8.9 oz)    Estimated RMR ([place]-St Jeor equation):   2093 kcals x 1.3 (light active) = 2721 kcals (for weight maintenance)     Recommended Protein Intake: 60-80 grams of protein/day  Patient Active Problem List:  Patient Active Problem List   Diagnosis     Morbid obesity (H)     Autism spectrum disorder     Moderate episode of recurrent major depressive disorder (H)     Hypothyroidism, unspecified type     YARA (generalized anxiety disorder)     Mild intellectual disability     Attention deficit hyperactivity disorder (ADHD), unspecified ADHD type     Oppositional defiant disorder     History of seizures     Lactose intolerance     Migraine without aura and without status migrainosus, not intractable     Non-seasonal allergic rhinitis due to pollen     Gastroesophageal reflux disease without esophagitis     Constipation, unspecified  constipation type     Lives in group home     Polycystic ovary syndrome     Peripheral vision loss, bilateral     Impaired physical mobility     Hirsutism     Eczema     Borderline personality disorder (H)     Seizures (H)     Diabetes: No     Progress on goals from last visit: Patient reports that she is having more food cravings, noting that she is going out to eat quite a bit more recently.  Patient reports that she is trying to stick with serving/portion sizes vs eating more than she should.  Patient reports that she would like to have more options and buy her own food vs always having to stick to the same things.  Patients that she has also been dealing with more stressors, which seems to be impacting her intake of food with increased cravings.      Exercise: did exercise yesterday, however did take a couple days off; walking typically, sit ups, and push ups for 60 minutes of walking, 15 sit ups and 15 push ups   Nutrition Diagnosis:    Overweight/Obesity (NC 3.3) related to overeating and poor lifestyle habits as evidenced by patient's subjective statements and BMI of 51.6 kg/2.     Intervention:  1. Food and/or nutrient delivery: balanced meals, adequate protein   2. Nutrition education: calorie recommendations, food journal  3. Nutrition counseling: exercise     Monitoring/Evaluation:    Goals:  1. Start tracking intake via food journal with the assistance of staff, aiming for 1800 calories/day.   2. Continue to exercise most days of the week, aiming for 60 minutes.     Patient to follow up in 1 month with RD        Video-Visit Details    Type of service:  Video Visit    Video End Time:11:48 AM    Originating Location (pt. Location): Home    Distant Location (provider location):  [facility] SURGERY [facility] AND BARIATRICS CARE [place]     Platform used for Video Visit: Xin Saldaña RD        Again, thank you for allowing me to participate in the care of your patient.         Sincerely,        Shima Saldaña, RD

## 2022-08-16 NOTE — PROGRESS NOTES
Jane Lee is a 27 year old who is being evaluated via a billable video visit.      How would you like to obtain your AVS? MyChart  If the video visit is dropped, the invitation should be resent by: Send to e-mail at: [email or web address]  Will anyone else be joining your video visit? No      Video Start Time: 11:28 AM      Medical  Weight Loss Follow-Up Diet Evaluation  Assessment:  Jane is presenting today for a follow up weight management nutrition consultation. Pt has had an initial appointment with Dr. Marcano.   Weight loss medication: None .   Pt's weight is 300 lbs   Initial weight: 360 lbs   Weight change: 60 lbs (down)     No flowsheet data found.  BMI: There is no height or weight on file to calculate BMI.  Ideal body weight: 54.7 kg (120 lb 9.5 oz)  Adjusted ideal body weight: 86.9 kg (191 lb 8.9 oz)    Estimated RMR ([place]-St Jeor equation):   2093 kcals x 1.3 (light active) = 2721 kcals (for weight maintenance)     Recommended Protein Intake: 60-80 grams of protein/day  Patient Active Problem List:  Patient Active Problem List   Diagnosis     Morbid obesity (H)     Autism spectrum disorder     Moderate episode of recurrent major depressive disorder (H)     Hypothyroidism, unspecified type     YARA (generalized anxiety disorder)     Mild intellectual disability     Attention deficit hyperactivity disorder (ADHD), unspecified ADHD type     Oppositional defiant disorder     History of seizures     Lactose intolerance     Migraine without aura and without status migrainosus, not intractable     Non-seasonal allergic rhinitis due to pollen     Gastroesophageal reflux disease without esophagitis     Constipation, unspecified constipation type     Lives in group home     Polycystic ovary syndrome     Peripheral vision loss, bilateral     Impaired physical mobility     Hirsutism     Eczema     Borderline personality disorder (H)     Seizures (H)     Diabetes: No     Progress on goals from last visit:  Patient reports that she is having more food cravings, noting that she is going out to eat quite a bit more recently.  Patient reports that she is trying to stick with serving/portion sizes vs eating more than she should.  Patient reports that she would like to have more options and buy her own food vs always having to stick to the same things.  Patients that she has also been dealing with more stressors, which seems to be impacting her intake of food with increased cravings.      Exercise: did exercise yesterday, however did take a couple days off; walking typically, sit ups, and push ups for 60 minutes of walking, 15 sit ups and 15 push ups   Nutrition Diagnosis:    Overweight/Obesity (NC 3.3) related to overeating and poor lifestyle habits as evidenced by patient's subjective statements and BMI of 51.6 kg/2.     Intervention:  1. Food and/or nutrient delivery: balanced meals, adequate protein   2. Nutrition education: calorie recommendations, food journal  3. Nutrition counseling: exercise     Monitoring/Evaluation:    Goals:  1. Start tracking intake via food journal with the assistance of staff, aiming for 1800 calories/day.   2. Continue to exercise most days of the week, aiming for 60 minutes.     Patient to follow up in 1 month with MARCELINA        Video-Visit Details    Type of service:  Video Visit    Video End Time:11:48 AM    Originating Location (pt. Location): Home    Distant Location (provider location):  [facility] SURGERY CLINIC AND BARIATRICS CARE [place]     Platform used for Video Visit: Xin Saldaña RD

## 2022-08-17 ENCOUNTER — TRANSFERRED RECORDS (OUTPATIENT)
Dept: FAMILY MEDICINE | Facility: CLINIC | Age: 27
End: 2022-08-17

## 2022-08-17 DIAGNOSIS — K21.9 GASTROESOPHAGEAL REFLUX DISEASE WITHOUT ESOPHAGITIS: ICD-10-CM

## 2022-08-17 RX ORDER — FAMOTIDINE 20 MG/1
TABLET, FILM COATED ORAL
Qty: 28 TABLET | Refills: 12 | Status: SHIPPED | OUTPATIENT
Start: 2022-08-17 | End: 2023-08-17

## 2022-08-17 NOTE — TELEPHONE ENCOUNTER
Routing refill request to provider for review/approval because:  PCP to determine refill     Jose Miguel Hurtado RN

## 2022-08-19 DIAGNOSIS — Z79.899 HIGH RISK MEDICATION USE: Primary | ICD-10-CM

## 2022-08-19 DIAGNOSIS — Z13.21 ENCOUNTER FOR VITAMIN DEFICIENCY SCREENING: ICD-10-CM

## 2022-08-21 ENCOUNTER — HOSPITAL ENCOUNTER (EMERGENCY)
Facility: CLINIC | Age: 27
Discharge: HOME | End: 2022-08-21
Attending: STUDENT IN AN ORGANIZED HEALTH CARE EDUCATION/TRAINING PROGRAM | Admitting: STUDENT IN AN ORGANIZED HEALTH CARE EDUCATION/TRAINING PROGRAM
Payer: MEDICAID

## 2022-08-21 VITALS
SYSTOLIC BLOOD PRESSURE: 147 MMHG | DIASTOLIC BLOOD PRESSURE: 98 MMHG | HEIGHT: 61 IN | TEMPERATURE: 99.4 F | RESPIRATION RATE: 18 BRPM | OXYGEN SATURATION: 98 % | WEIGHT: 293 LBS | BODY MASS INDEX: 55.32 KG/M2 | HEART RATE: 68 BPM

## 2022-08-21 DIAGNOSIS — R46.89 BEHAVIOR CONCERN: ICD-10-CM

## 2022-08-21 LAB
ALBUMIN UR-MCNC: NEGATIVE MG/DL
APPEARANCE UR: CLEAR
BACTERIA #/AREA URNS HPF: ABNORMAL /HPF
BILIRUB UR QL STRIP: NEGATIVE
COLOR UR AUTO: YELLOW
GLUCOSE UR STRIP-MCNC: NEGATIVE MG/DL
HCG UR QL: NEGATIVE
HGB UR QL STRIP: NEGATIVE
KETONES UR STRIP-MCNC: NEGATIVE MG/DL
LEUKOCYTE ESTERASE UR QL STRIP: ABNORMAL
NITRATE UR QL: NEGATIVE
PH UR STRIP: 7 [PH] (ref 5–7)
RBC URINE: <1 /HPF
SP GR UR STRIP: 1.01 (ref 1–1.03)
SQUAMOUS EPITHELIAL: 1 /HPF
TRANSITIONAL EPI: 1 /HPF
UROBILINOGEN UR STRIP-MCNC: NORMAL MG/DL
WBC URINE: 7 /HPF

## 2022-08-21 PROCEDURE — 81025 URINE PREGNANCY TEST: CPT | Performed by: STUDENT IN AN ORGANIZED HEALTH CARE EDUCATION/TRAINING PROGRAM

## 2022-08-21 PROCEDURE — 99284 EMERGENCY DEPT VISIT MOD MDM: CPT | Performed by: STUDENT IN AN ORGANIZED HEALTH CARE EDUCATION/TRAINING PROGRAM

## 2022-08-21 PROCEDURE — 90791 PSYCH DIAGNOSTIC EVALUATION: CPT

## 2022-08-21 PROCEDURE — 99284 EMERGENCY DEPT VISIT MOD MDM: CPT | Mod: 25

## 2022-08-21 PROCEDURE — 81001 URINALYSIS AUTO W/SCOPE: CPT | Performed by: STUDENT IN AN ORGANIZED HEALTH CARE EDUCATION/TRAINING PROGRAM

## 2022-08-21 ASSESSMENT — ACTIVITIES OF DAILY LIVING (ADL)
ADLS_ACUITY_SCORE: 35
ADLS_ACUITY_SCORE: 35
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
## 2022-08-22 ENCOUNTER — DOCUMENTATION ONLY (OUTPATIENT)
Dept: OTHER | Facility: CLINIC | Age: 27
End: 2022-08-22

## 2022-08-22 NOTE — ED PROVIDER NOTES
History     Chief Complaint   Patient presents with     Suicidal     HPI  Jane Lee is a 27 year old female with documented past medical history which includes autism spectrum disorder, MDD, YARA, intellectual disability and borderline personality disorder who presents to the department from [facility] for evaluation after reportedly telling staff that she was suicidal.  In the department she admits to feeling frustrated with other members of the group home, in particular when everyone is talking at the same time and things feel chaotic.  She says that she has thought about stabbing herself with a fork, although does not have access to dangerous items.  She admits to feeling better in the department, does not want to harm herself or others at this time.  She is also without fever, chills, headache, alcohol or drug use, or recent injury.      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       Impaired physical mobility 03/19/2021     Priority: Medium     Hirsutism 03/19/2021     Priority: Medium     Eczema 03/19/2021     Priority: Medium     Lives in group home 09/30/2020     Priority: Medium     Constipation, unspecified constipation type 07/09/2020     Priority: Medium     Gastroesophageal reflux disease without esophagitis 07/01/2020     Priority: Medium     Autism spectrum disorder 09/22/2019     Priority: Medium     Moderate episode of recurrent major depressive disorder (H) 09/22/2019     Priority: Medium     Hypothyroidism, unspecified type 09/22/2019     Priority:  "Medium     YARA (generalized anxiety disorder) 09/22/2019     Priority: Medium     Mild intellectual disability 09/22/2019     Priority: Medium     Attention deficit hyperactivity disorder (ADHD), unspecified ADHD type 09/22/2019     Priority: Medium     Oppositional defiant disorder 09/22/2019     Priority: Medium     History of seizures 09/22/2019     Priority: Medium     Last seizure in her teens       Lactose intolerance 09/22/2019     Priority: Medium     Migraine without aura and without status migrainosus, not intractable 09/22/2019     Priority: Medium     Non-seasonal allergic rhinitis due to pollen 09/22/2019     Priority: Medium     Morbid obesity (H) 09/18/2019     Priority: Medium        Past Medical History:    Past Medical History:   Diagnosis Date     Anxiety and depression      Autism      Autism spectrum disorder      Dyslipidemia      Eczema      GERD (gastroesophageal reflux disease)      History of seizures      Hypothyroidism      Impaired physical mobility      Migraine      Morbid obesity with BMI of 60.0-69.9, adult (H)      Oppositional defiant disorder      Peripheral vision loss, bilateral      Polycystic ovary syndrome      Seasonal allergies        Past Surgical History:    Past Surgical History:   Procedure Laterality Date     BRAIN SURGERY  2000    for seizures. 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\"     BRAIN SURGERY  2000    for epilepsy age 4     TOENAIL EXCISION Right     ingrowing toenail removal      WISDOM TOOTH EXTRACTION         Family History:    Family History   Problem Relation Age of Onset     Diabetes Mother      Coronary Artery Disease Mother      Obesity Mother      Breast Cancer Paternal Aunt 50     Bipolar Disorder Mother        Social History:  Marital Status:  Single [1]  Social History     Tobacco Use     Smoking status: Never Smoker     Smokeless tobacco: Never Used   Substance Use Topics     Alcohol use: Never     Drug use: Never    " "    Medications:    albuterol (PROAIR HFA/PROVENTIL HFA/VENTOLIN HFA) 108 (90 Base) MCG/ACT inhaler  ALPRAZolam (XANAX) 0.5 MG tablet  famotidine (PEPCID) 20 MG tablet  GAVILAX 17 GM/SCOOP powder  LACTASE ENZYME 3000 units tablet  levothyroxine (SYNTHROID/LEVOTHROID) 75 MCG tablet  lithium (ESKALITH CR/LITHOBID) 450 MG CR tablet  loratadine (CLARITIN) 10 MG tablet  miconazole (MICATIN) 2 % external cream  MICONAZOLE 7 2 % cream  OLANZapine (ZYPREXA) 5 MG tablet  OXcarbazepine (TRILEPTAL) 600 MG tablet  SUMAtriptan (IMITREX) 50 MG tablet  traZODone (DESYREL) 150 MG tablet  triamcinolone (KENALOG) 0.1 % external cream  venlafaxine (EFFEXOR-XR) 75 MG 24 hr capsule          Review of Systems  Constitutional:  Negative for fever or recent illness.  Respiratory:  Negative for cough or respiratory infectious symptoms.   Gastrointestinal:  Negative for abdominal pain, nausea or vomiting.   Musculoskeletal:  Denies recent injury.  Neurological:  Negative for headache or dizziness.    All others reviewed and are negative.      Physical Exam   BP: (!) 147/98  Pulse: 68  Temp: 99.4  F (37.4  C)  Resp: 18  Height: 154.9 cm (5' 1\")  Weight: 135 kg (297 lb 9.6 oz)  SpO2: 98 %      Physical Exam  Constitutional:  Well developed, well nourished.  Appears nontoxic and in no acute distress.  Resting comfortably on the gurney.  HENT:  Normocephalic and atraumatic.  Symmetric in appearance.  Eyes:  Conjunctivae are normal.  Cardiovascular:  No cyanosis.  RRR.  No audible murmurs noted.    Respiratory:  Effort normal without sign of respiratory distress.  No audible wheezing or stridor.   Gastrointestinal:  Soft nondistended abdomen.  Nontender and without guarding.  No rigidity or rebound tenderness.  Musculoskeletal:  Moves extremities spontaneously.  Neurological:  Patient is alert and conversing appropriately.  Skin:  Skin is warm and dry.  Psych:  Well-kept appearance.  Patient does not show signs of confusion or intoxication.  Able " to carry a conversation with organized speech and thought process.  Seems to have insight into their perceived condition.  Normal mood and affect, not overly anxious, not agitated or threatening.  Denies suicidal ideation or intent to harm self/others.  Denies visual or auditory hallucinations.  Without evidence of delusions or psychosis.      ED Course     Mental Health Risk Assessment      PSS-3    Date and Time Over the past 2 weeks have you felt down, depressed, or hopeless? Over the past 2 weeks have you had thoughts of killing yourself? Have you ever attempted to kill yourself? When did this last happen? User   08/21/22 1753 yes yes yes more than 6 months ago BLK      C-SSRS ([place])    Date and Time Q1 Wished to be Dead (Past Month) Q2 Suicidal Thoughts (Past Month) Q3 Suicidal Thought Method Q4 Suicidal Intent without Specific Plan Q5 Suicide Intent with Specific Plan Q6 Suicide Behavior (Lifetime) Within the Past 3 Months? RETIRED: Level of Risk per Screen Screening Not Complete User   08/21/22 1753 yes yes yes no yes yes -- -- -- BLK              Suicide assessment completed by mental health (D.E.C., LCSW, etc.)                 Results for orders placed or performed during the hospital encounter of 08/21/22 (from the past 24 hour(s))   UA with Microscopic reflex to Culture    Specimen: Urine, Midstream   Result Value Ref Range    Color Urine Yellow Colorless, Straw, Light Yellow, Yellow    Appearance Urine Clear Clear    Glucose Urine Negative Negative mg/dL    Bilirubin Urine Negative Negative    Ketones Urine Negative Negative mg/dL    Specific Gravity Urine 1.010 1.003 - 1.035    Blood Urine Negative Negative    pH Urine 7.0 5.0 - 7.0    Protein Albumin Urine Negative Negative mg/dL    Urobilinogen Urine Normal Normal, 2.0 mg/dL    Nitrite Urine Negative Negative    Leukocyte Esterase Urine Small (A) Negative    Bacteria Urine Few (A) None Seen /HPF    RBC Urine <1 <=2 /HPF    WBC Urine 7 (H) <=5 /HPF     Squamous Epithelials Urine 1 <=1 /HPF    Transitional Epithelials Urine 1 <=1 /HPF    Narrative    Urine Culture not indicated   HCG qualitative urine   Result Value Ref Range    hCG Urine Qualitative Negative Negative       Medications - No data to display    Assessments & Plan (with Medical Decision Making)   Jane Lee is a 27 year old female who presents to the department for evaluation after reporting to staff that she had thoughts of committing suicide.  In the department she describes feeling frustrated with other group home members, in particular when things feel out of control or chaotic such as with multiple people talking at the same time.  She was recently evaluated by her psychiatrist and medication adjustments were ordered, however group home staff have yet to  prescription or make recommended changes.  Patient has been independently interviewed by behavioral health  who agrees that she is not actively suicidal, does not have access to weapons or medications, and is in a monitored setting deemed to be safe.  Recommend she return to group home with continued monitoring and previously recommended medication adjustments.        Disclaimer:  This note consists of symbols derived from keyboarding, dictation, and/or voice recognition software.  As a result, there may be errors in the script that have gone undetected.  Please consider this when interpreting information found in the chart.        I have reviewed the nursing notes.    I have reviewed the findings, diagnosis, plan and need for follow up with the patient.      New Prescriptions    No medications on file       Final diagnoses:   Behavior concern       8/21/2022   [facility] EMERGENCY DEPT     Jaxon Andrea DO  08/21/22 1926

## 2022-08-22 NOTE — DISCHARGE INSTRUCTIONS
"Aftercare Plan  If I am feeling unsafe or I am in a crisis, I will:   Contact my established care providers   Call the National Suicide Prevention Lifeline: 988  Go to the nearest emergency room   Call 911     Warning signs that I or other people might notice when a crisis is developing for me: Feeling more anxious and out of control.    Things I am able to do on my own to cope or help me feel better: Play on my Ipad, take a walk      Things that I am able to do with others to cope or help me better: Talk to my house mates, call my mom     Things I can use or do for distraction: Singing and talking to other people.     Changes I can make to support my mental health and wellness: Utilize my coping skills when I get upset.      People in my life that I can ask for help: Mom,  and staff     [facility] has a mental health crisis team you can call 24/7: [facility] Mobile Crisis  [phone]    Additional resources and information: Continue working with your providers and discuss with your guardian options to begin therapy.         Crisis Lines  Crisis Text Line  Text 463177  You will be connected with a trained live crisis counselor to provide support.    Por espanol, texto  SUE a 969158 o texto a 442-AYUDAME en WhatsApp    The Ricky Project (LGBTQ Youth Crisis Line)  2.310.826.6113  text START to 646-432      Community Resources  Fast Tracker  Linking people to mental health and substance use disorder resources  fasttrackermn.org     [place] Mental Health Warm Line  Peer to peer support  Monday thru Saturday, 12 pm to 10 pm  513.089.9897 or 4.138.767.3746  Text \"Support\" to 20983    National [place] on Mental Illness (YANIV)  923.986.6112 or 1.888.YANIV.HELPS      Mental Health Apps  My3  https://my3app.org/    VirtualHopeBox  https://GymRealm.org/apps/virtual-hope-box/      Additional Information  Today you were seen by a licensed mental health professional through Triage and " Transition services, Behavioral Healthcare Providers ([facility])  for a crisis assessment in the Emergency Department at [facility].  It is recommended that you follow up with your established providers (psychiatrist, mental health therapist, and/or primary care doctor - as relevant) as soon as possible. Coordinators from [facility] will be calling you in the next 24-48 hours to ensure that you have the resources you need.  You can also contact [facility] coordinators directly at [phone]. You may have been scheduled for or offered an appointment with a mental health provider. [facility] maintains an extensive network of licensed behavioral health providers to connect patients with the services they need.  We do not charge providers a fee to participate in our referral network.  We match patients with providers based on a patient's specific needs, insurance coverage, and location.  Our first effort will be to refer you to a provider within your care system, and will utilize providers outside your care system as needed.

## 2022-08-22 NOTE — ED NOTES
Update to house mgr, unable to secure a ride from . [facility] en route to transport patient back to .

## 2022-08-23 ENCOUNTER — LAB (OUTPATIENT)
Dept: LAB | Facility: CLINIC | Age: 27
End: 2022-08-23
Payer: MEDICAID

## 2022-08-23 DIAGNOSIS — Z79.899 HIGH RISK MEDICATION USE: ICD-10-CM

## 2022-08-23 DIAGNOSIS — Z13.21 ENCOUNTER FOR VITAMIN DEFICIENCY SCREENING: ICD-10-CM

## 2022-08-23 LAB
ANION GAP SERPL CALCULATED.3IONS-SCNC: 9 MMOL/L (ref 3–14)
BASOPHILS # BLD AUTO: 0 10E3/UL (ref 0–0.2)
BASOPHILS NFR BLD AUTO: 0 %
BUN SERPL-MCNC: 12 MG/DL (ref 7–30)
CALCIUM SERPL-MCNC: 9.2 MG/DL (ref 8.5–10.1)
CHLORIDE BLD-SCNC: 109 MMOL/L (ref 94–109)
CHOLEST SERPL-MCNC: 184 MG/DL
CO2 SERPL-SCNC: 22 MMOL/L (ref 20–32)
CREAT SERPL-MCNC: 0.68 MG/DL (ref 0.52–1.04)
EOSINOPHIL # BLD AUTO: 0.3 10E3/UL (ref 0–0.7)
EOSINOPHIL NFR BLD AUTO: 4 %
ERYTHROCYTE [DISTWIDTH] IN BLOOD BY AUTOMATED COUNT: 12.5 % (ref 10–15)
FASTING STATUS PATIENT QL REPORTED: ABNORMAL
FASTING STATUS PATIENT QL REPORTED: NORMAL
GFR SERPL CREATININE-BSD FRML MDRD: >90 ML/MIN/1.73M2
GLUCOSE BLD-MCNC: 99 MG/DL (ref 70–99)
GLUCOSE BLD-MCNC: 99 MG/DL (ref 70–99)
HBA1C MFR BLD: 4.5 % (ref 0–5.6)
HCT VFR BLD AUTO: 39.6 % (ref 35–47)
HDLC SERPL-MCNC: 40 MG/DL
HGB BLD-MCNC: 12.4 G/DL (ref 11.7–15.7)
LDLC SERPL CALC-MCNC: 114 MG/DL
LITHIUM SERPL-SCNC: 1 MMOL/L
LYMPHOCYTES # BLD AUTO: 2.2 10E3/UL (ref 0.8–5.3)
LYMPHOCYTES NFR BLD AUTO: 29 %
MCH RBC QN AUTO: 28.5 PG (ref 26.5–33)
MCHC RBC AUTO-ENTMCNC: 31.3 G/DL (ref 31.5–36.5)
MCV RBC AUTO: 91 FL (ref 78–100)
MONOCYTES # BLD AUTO: 0.6 10E3/UL (ref 0–1.3)
MONOCYTES NFR BLD AUTO: 8 %
NEUTROPHILS # BLD AUTO: 4.4 10E3/UL (ref 1.6–8.3)
NEUTROPHILS NFR BLD AUTO: 58 %
NONHDLC SERPL-MCNC: 144 MG/DL
PLATELET # BLD AUTO: 253 10E3/UL (ref 150–450)
POTASSIUM BLD-SCNC: 4 MMOL/L (ref 3.4–5.3)
PROLACTIN SERPL 3RD IS-MCNC: 31 NG/ML (ref 5–23)
RBC # BLD AUTO: 4.35 10E6/UL (ref 3.8–5.2)
SODIUM SERPL-SCNC: 140 MMOL/L (ref 133–144)
TRIGL SERPL-MCNC: 148 MG/DL
TSH SERPL DL<=0.005 MIU/L-ACNC: 2.56 MU/L (ref 0.4–4)
WBC # BLD AUTO: 7.6 10E3/UL (ref 4–11)

## 2022-08-23 PROCEDURE — 80178 ASSAY OF LITHIUM: CPT

## 2022-08-23 PROCEDURE — 82607 VITAMIN B-12: CPT

## 2022-08-23 PROCEDURE — 80061 LIPID PANEL: CPT

## 2022-08-23 PROCEDURE — 84146 ASSAY OF PROLACTIN: CPT

## 2022-08-23 PROCEDURE — 84443 ASSAY THYROID STIM HORMONE: CPT

## 2022-08-23 PROCEDURE — 83036 HEMOGLOBIN GLYCOSYLATED A1C: CPT

## 2022-08-23 PROCEDURE — 85025 COMPLETE CBC W/AUTO DIFF WBC: CPT

## 2022-08-23 PROCEDURE — 36415 COLL VENOUS BLD VENIPUNCTURE: CPT

## 2022-08-23 PROCEDURE — 80048 BASIC METABOLIC PNL TOTAL CA: CPT

## 2022-08-23 PROCEDURE — 82306 VITAMIN D 25 HYDROXY: CPT

## 2022-08-24 LAB
DEPRECATED CALCIDIOL+CALCIFEROL SERPL-MC: 34 UG/L (ref 20–75)
VIT B12 SERPL-MCNC: 632 PG/ML (ref 232–1245)

## 2022-09-18 ENCOUNTER — HEALTH MAINTENANCE LETTER (OUTPATIENT)
Age: 27
End: 2022-09-18

## 2022-09-28 ENCOUNTER — TRANSFERRED RECORDS (OUTPATIENT)
Dept: FAMILY MEDICINE | Facility: CLINIC | Age: 27
End: 2022-09-28

## 2022-10-05 ENCOUNTER — HOSPITAL ENCOUNTER (EMERGENCY)
Facility: HOSPITAL | Age: 27
Discharge: HOME | End: 2022-10-05
Attending: EMERGENCY MEDICINE | Admitting: EMERGENCY MEDICINE
Payer: MEDICAID

## 2022-10-05 VITALS
DIASTOLIC BLOOD PRESSURE: 61 MMHG | HEART RATE: 70 BPM | OXYGEN SATURATION: 99 % | SYSTOLIC BLOOD PRESSURE: 124 MMHG | RESPIRATION RATE: 18 BRPM | TEMPERATURE: 98 F

## 2022-10-05 DIAGNOSIS — R45.851 THREATENING SUICIDE: ICD-10-CM

## 2022-10-05 DIAGNOSIS — R45.850 HOMICIDAL THOUGHTS: ICD-10-CM

## 2022-10-05 LAB
ALBUMIN UR-MCNC: NEGATIVE MG/DL
AMPHETAMINES UR QL SCN: NORMAL
APPEARANCE UR: CLEAR
BARBITURATES UR QL SCN: NORMAL
BENZODIAZ UR QL SCN: NORMAL
BILIRUB UR QL STRIP: NEGATIVE
BZE UR QL SCN: NORMAL
CANNABINOIDS UR QL SCN: NORMAL
COLOR UR AUTO: COLORLESS
GLUCOSE UR STRIP-MCNC: NEGATIVE MG/DL
HCG UR QL: NEGATIVE
HGB UR QL STRIP: NEGATIVE
KETONES UR STRIP-MCNC: NEGATIVE MG/DL
LEUKOCYTE ESTERASE UR QL STRIP: ABNORMAL
NITRATE UR QL: NEGATIVE
OPIATES UR QL SCN: NORMAL
PCP QUAL URINE (ROCHE): NORMAL
PH UR STRIP: 7.5 [PH] (ref 5–7)
RBC URINE: 0 /HPF
SP GR UR STRIP: 1.01 (ref 1–1.03)
SQUAMOUS EPITHELIAL: 1 /HPF
TRANSITIONAL EPI: <1 /HPF
UROBILINOGEN UR STRIP-MCNC: <2 MG/DL
WBC URINE: 4 /HPF

## 2022-10-05 PROCEDURE — 99285 EMERGENCY DEPT VISIT HI MDM: CPT | Mod: 25

## 2022-10-05 PROCEDURE — 81025 URINE PREGNANCY TEST: CPT | Performed by: EMERGENCY MEDICINE

## 2022-10-05 PROCEDURE — 250N000013 HC RX MED GY IP 250 OP 250 PS 637: Performed by: EMERGENCY MEDICINE

## 2022-10-05 PROCEDURE — 90791 PSYCH DIAGNOSTIC EVALUATION: CPT

## 2022-10-05 PROCEDURE — 80307 DRUG TEST PRSMV CHEM ANLYZR: CPT | Performed by: EMERGENCY MEDICINE

## 2022-10-05 PROCEDURE — 81001 URINALYSIS AUTO W/SCOPE: CPT | Performed by: EMERGENCY MEDICINE

## 2022-10-05 RX ORDER — ALPRAZOLAM 0.5 MG
0.5 TABLET ORAL ONCE
Status: COMPLETED | OUTPATIENT
Start: 2022-10-05 | End: 2022-10-05

## 2022-10-05 RX ADMIN — ALPRAZOLAM 0.5 MG: 0.5 TABLET ORAL at 21:33

## 2022-10-05 ASSESSMENT — ENCOUNTER SYMPTOMS
NAUSEA: 0
SHORTNESS OF BREATH: 0
DYSURIA: 1
DIARRHEA: 0
COUGH: 0
FEVER: 0
ABDOMINAL PAIN: 0
VOMITING: 0

## 2022-10-05 ASSESSMENT — ACTIVITIES OF DAILY LIVING (ADL)
ADLS_ACUITY_SCORE: 35
ADLS_ACUITY_SCORE: 35

## 2022-10-05 NOTE — ED PROVIDER NOTES
EMERGENCY DEPARTMENT ENCOUNTER      NAME: Jane Lee  AGE: 27 year old female  YOB: 1995  MRN: [id number]  EVALUATION DATE & TIME: 10/5/2022  5:40 PM    PCP: Su Travis    ED PROVIDER: Rachel Wong M.D.        Chief Complaint   Patient presents with     Suicidal         FINAL IMPRESSION:    1. Threatening suicide    2. Homicidal thoughts            MEDICAL DECISION MAKIN year old female with history of autism who presents the emergency reports of suicidal and homicidal thoughts.  Her caregiver took away her electronics and therefore patient stated that she wanted to kill her self and then want to kill the caregiver.  Here in the ER patient is calm and cooperative.  Was seen by DEC who recommended giving her a dose of her usual as needed Xanax if the patient was no longer suicidal can be discharged home.  Even before giving her Xanax patient states that she was no longer suicidal.  She is feeling still little sad but did not want to harm herself or her caregiver.  At this time I feel the patient is appropriate for discharge home.  Caregiver felt more comfortable patient getting a dose of Xanax before headed back home and this will be provided.        ED COURSE:  6:11 PM  I met with the patient to gather history and perform my exam. ED course and treatment discussed.    Patient was seen by Rudy.  She recommend giving the patient a dose of her as needed Xanax from home and if the patient is no longer suicidal and she can be discharged.  I went to speak to the patient and she is not suicidal even without the Xanax.  Caregiver felt more comfortable with patient getting a dose of presenters before sending her back home.  Patient was in agreement.  She states that she is still having some depression but denies wanting to hurt herself.  She denies wanting to hurt her caregiver.  She felt comfortable with discharge back home.  At this time I feel the patient is  otherwise medically cleared, will be given a dose of her Xanax, and discharged back to her home.      COVID-19 PPE worn during patient evaluation:  Mask: n95 and homemade masks   Eye Protection: goggles   Gown: none   Hair cover: yes  Face shield: none   Patient wearing a mask: No    At the conclusion of the encounter I discussed the results of all of the tests and the disposition. Their questions were answered. The patient (and any family present) acknowledged understanding and were agreeable with the care plan.        Mental Health Risk Assessment      PSS-3    Date and Time Over the past 2 weeks have you felt down, depressed, or hopeless? Over the past 2 weeks have you had thoughts of killing yourself? Have you ever attempted to kill yourself? When did this last happen? User   10/05/22 1731 yes yes yes more than 6 months ago WAYNE      C-SSRS ([place])    Date and Time Q1 Wished to be Dead (Past Month) Q2 Suicidal Thoughts (Past Month) Q3 Suicidal Thought Method Q4 Suicidal Intent without Specific Plan Q5 Suicide Intent with Specific Plan Q6 Suicide Behavior (Lifetime) Within the Past 3 Months? RETIRED: Level of Risk per Screen Screening Not Complete User   10/05/22 1731 no yes yes no yes yes -- -- -- WAYNE              Suicide assessment completed by mental health (D.E.C., LCSW, etc.)       CONSULTANTS:  DEC        MEDICATIONS GIVEN IN THE EMERGENCY:  Medications - No data to display        NEW PRESCRIPTIONS STARTED AT TODAY'S ER VISIT     Medication List      There are no discharge medications for this visit.             CONDITION:  stable        DISPOSITION:  discharge home         =================================================================  =================================================================    HPI    Patient information was obtained from: patient    Use of Intrepreter: N/A     Jane Lee is a 27 year old female with history of oppositional defiance, autism, seizures, depression who  "presents to the ER with complaints of suicidal and homicidal thoughts.    Patient was upset with her caregiver as they threatened to take away some of her things.  She became aggressive and threw an iPad and broke a window.  She then threatened to kill her caregiver.  Caregiver then sent her to the ER due to escalating behaviors.    Patient also reports that she thinks she may have a UTI.    Otherwise denies any fevers, cough, chest pain, shortness of breath, abdominal pain, vomiting, diarrhea.    Patient states that her mother has bipolar and patient is wondering if maybe she has bipolar as well.      REVIEW OF SYSTEMS  Review of Systems   Constitutional: Negative for fever.   Respiratory: Negative for cough and shortness of breath.    Cardiovascular: Negative for chest pain.   Gastrointestinal: Negative for abdominal pain, diarrhea, nausea and vomiting.   Genitourinary: Positive for dysuria.   Allergic/Immunologic: Negative for immunocompromised state.   Psychiatric/Behavioral: Positive for suicidal ideas.        +homicidal threats to care giver   All other systems reviewed and are negative.        PAST MEDICAL HISTORY:  Past Medical History:   Diagnosis Date     Anxiety and depression      Autism      Autism spectrum disorder      Dyslipidemia      Eczema      GERD (gastroesophageal reflux disease)      History of seizures     Last seizure in her teens     Hypothyroidism      Impaired physical mobility      Migraine      Morbid obesity with BMI of 60.0-69.9, adult (H)      Oppositional defiant disorder      Peripheral vision loss, bilateral      Polycystic ovary syndrome      Seasonal allergies          PAST SURGICAL HISTORY:  Past Surgical History:   Procedure Laterality Date     BRAIN SURGERY  2000    for seizures. 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\"     BRAIN SURGERY  2000    for epilepsy age 4     TOENAIL EXCISION Right     ingrowing toenail removal      WISDOM TOOTH EXTRACTION   "         CURRENT MEDICATIONS:    Prior to Admission medications    Medication Sig Start Date End Date Taking? Authorizing Provider   albuterol (PROAIR HFA/PROVENTIL HFA/VENTOLIN HFA) 108 (90 Base) MCG/ACT inhaler Inhale 2 puffs into the lungs 3/18/21   Reported, Patient   ALPRAZolam (XANAX) 0.5 MG tablet  5/10/21   Reported, Patient   famotidine (PEPCID) 20 MG tablet TAKE 1 TABLET BY MOUTH EVERY NIGHT AT BEDTIME 8/17/22   Su Travis PA-C   GAVILAX 17 GM/SCOOP powder MIX 17GM WITH 8OZ OF WATER OR OTHER BEVERAGE AND DRINK BY MOUTH ONCE DAILY 7/9/20   Mary Pan MD   LACTASE ENZYME 3000 units tablet TAKE 3 TABLETS BY MOUTH 3 TIMES DAILY AS NEEDED (9000 UNITS) WITH FOOD CONTAINING LACTOSE 1/21/20   Humera Fishman MD   levothyroxine (SYNTHROID/LEVOTHROID) 75 MCG tablet Take 1 tablet (75 mcg) by mouth daily 5/12/22   Su Travis PA-C   lithium (ESKALITH CR/LITHOBID) 450 MG CR tablet TAKE 1 TABLET BY MOUTH THREE TIMES DAILY 8/26/19   Reported, Patient   loratadine (CLARITIN) 10 MG tablet TAKE 1 TABLET BY MOUTH DAILY 12/3/21   Su Travis PA-C   miconazole (MICATIN) 2 % external cream Apply topically 2 times daily as needed (vaginal itching) 6/29/22   Su Travis PA-C   MICONAZOLE 7 2 % cream  6/29/22   Reported, Patient   OLANZapine (ZYPREXA) 5 MG tablet TAKE 1/2 TABLET BY MOUTH ONCE DAILY AT 8AM AND TAKE 1 TABLET DAILY AT 4PM 7/25/19   Reported, Patient   OXcarbazepine (TRILEPTAL) 600 MG tablet TAKE 1 TABLET BY MOUTH 3 TIMES DAILY 3/25/22   Su Travis PA-C   SUMAtriptan (IMITREX) 50 MG tablet TAKE 1 TABLET BY MOUTH AT ONSET OF HEADACHE, MAY REPEAT IN 2 HOURS. UP TO TWICE DAILY AS NEEDED 2/16/22   Humera Fishman MD   traZODone (DESYREL) 150 MG tablet TAKE 1 TABLET BY MOUTH DAILY AT BEDTIME 7/25/19   Reported, Patient   triamcinolone (KENALOG) 0.1 % external cream Apply topically 2 times daily As needed for 1-2 weeks for rash on neck 6/29/22   Su Travis PA-C    venlafaxine (EFFEXOR-XR) 75 MG 24 hr capsule Take 75 mg by mouth daily 9/4/19   Reported, Patient         ALLERGIES:  Allergies   Allergen Reactions     Metformin Diarrhea     Lamotrigine Rash         FAMILY HISTORY:  Family History   Problem Relation Age of Onset     Diabetes Mother      Coronary Artery Disease Mother      Obesity Mother      Breast Cancer Paternal Aunt 50     Bipolar Disorder Mother          SOCIAL HISTORY:  Social History     Socioeconomic History     Marital status: Single     Spouse name: None     Number of children: None     Years of education: None     Highest education level: None   Occupational History     Occupation: Works at [employer] in [place] 1 day every other week   Tobacco Use     Smoking status: Never Smoker     Smokeless tobacco: Never Used   Substance and Sexual Activity     Alcohol use: Never     Drug use: Never     Sexual activity: Never         VITALS:  Patient Vitals for the past 24 hrs:   BP Temp Temp src Pulse Resp SpO2   10/05/22 1729 (!) 137/93 98  F (36.7  C) Temporal 68 18 100 %       Wt Readings from Last 3 Encounters:   08/21/22 134.7 kg (297 lb)   06/07/22 135.2 kg (298 lb)   05/06/22 139.2 kg (306 lb 14.4 oz)       CrCl cannot be calculated (Patient's most recent lab result is older than the maximum 30 days allowed.).    PHYSICAL EXAM    Constitutional:  Well developed, Well nourished, NAD, GCS 15  HENT:  Normocephalic, Atraumatic, Bilateral external ears normal, Nose normal. Neck- Supple, No stridor.   Eyes:  PERRL, EOMI, Conjunctiva normal, No discharge.  Respiratory:  Normal breath sounds, No respiratory distress, No wheezing, Speaks full sentences easily. No cough.   Cardiovascular:  Normal heart rate, Regular rhythm, No rubs, No gallops. Chest wall nontender.  GI:  + obesity.  Bowel sounds normal, Soft, No tenderness, No masses, No flank tenderness. No rebound or guarding.  : deferred  Musculoskeletal:  No cyanosis, No clubbing. Good range of  motion in all major joints. No major deformities noted.   Integument:  Warm, Dry, No erythema, No rash.  No petechiae.   Neurologic:  Alert & oriented x 3, No focal deficits noted. Normal gait.  Psychiatric:  Affect normal, Cooperative         LAB:  All pertinent labs reviewed and interpreted.  No results found for this or any previous visit (from the past 24 hour(s)).    No results found for: ABORH        RADIOLOGY:  Reviewed all pertinent imaging. Please see official radiology report.    No orders to display         EKG:    none    PROCEDURES:  none          I, Jaskaran Lee, am serving as a scribe to document services personally performed by Dr. Rachel Wong based on my observation and the provider's statements to me. I, Dr. Rachel Wong MD attest that Jaskaran Lee is acting in a scribe capacity, has observed my performance of the services and has documented them in accordance with my direction.        Rachel Wong M.D. [facility]  Emergency Medicine and Medical Toxicology  Formerly [facility] EMERGENCY DEPARTMENT  [facility] [address]  [phone]  Dept: [phone]           Rachel Wong MD  10/05/22 0243

## 2022-10-05 NOTE — ED NOTES
"Supper ordered. she is in the room with a 1-1.She is alert. she said taht she feels that she has a UTI and yeast infections and she does not feel like the abx are getting rid of her UTI. Yesterday she was having aggressive behaviors and she broke a window. She said \"I wanted to kill myself, I was mad at my guardian and I wanted to cut myself then kill my guardian. Then I wanted to kill myself. I know I am not feeling right. I don't know what is wrong with me but I know I am not feeling right.\" She is calm and cooperative laying in the bed.  She agreed with her guardian when she suggested her to come here.   "

## 2022-10-05 NOTE — Clinical Note
"Aftercare Plan  If I am feeling unsafe or I am in a crisis, I will:   Contact my established care providers   Call the National Suicide Prevention Lifeline: 988  Go to the nearest emergency room   Call 911      Warning signs that I or other people mi [facility] notice when a crisis is developing for me: \"Not feeling myself.  Having more behaviors.  I don't like it.\"     Things I am able to do on my own to cope or help me feel better: \"I need to learn more coping.\"     Things that I am able to do with ot hers to cope or help me better: \"Take my prn\"     Things I can use or do for distraction: Singing and talking to other people.      Changes I can make to support my mental health and wellness: \"Get therapy\"     People in my life that I can ask for he lp: Mom,  and staff      Your [facility] has a mental health crisis team you can call 24/7: [facility] Mobile Crisis  [phone]       Additional resources and information: Continue working with your providers and discuss with your  guardian options to begin therapy.           Crisis Lines  Crisis Text Line  Text 554774  You will be connected with a trained live crisis counselor to provide support.     Por espanol, texto  SUE a 921352 o texto a 442-AYUDAME en WhatsApp     Th e Ricky Project (LGBTQ Youth Crisis Line)  2.241.189.8797  text START to 176-451        Community Resources  Fast Tracker  Linking people to mental health and substance use disorder resources  fasttrackermn.org      [place] Mental [facility] Warm  Line  Peer to peer support  Monday thru Saturday, 12 pm to 10 pm  517.427.3543 or 5.145.337.9470  Text \"Support\" to 02436     National [place] on Mental Illness (YANIV)  516.754.7647 or 1.888.YANIV.HELPS        Mental Health Apps  My3  https:// myMathZeepp.org/     VirtualHopeBox  https://MEARS Technologies.org/apps/virtual-hope-box/        Additional Information  Today you were seen by a licensed mental health professional through Triage and " Transition services, Behavioral Healthcare Providers ([facility])   for a crisis assessment in the Emergency Department at [facility].  It is recommended that you follow up with your established providers (psychiatrist, mental health therapist, and/or primary care doctor - as relevant) as soon as possible. Co ordinators from [facility] will be calling you in the next 24-48 hours to ensure that you have the resources you need.  You can also contact [facility] coordinators directly at [phone]. You may have been scheduled for or offered an appointment with a [facility] provider. [facility] maintains an extensive network of licensed behavioral health providers to connect patients with the services they need.  We do not charge providers a fee to participate in our referral network.  We match patients with providers ba [facility] on a patient's specific needs, insurance coverage, and location.  Our first effort will be to refer you to a provider within your care system, and will utilize providers outside your care system as needed.

## 2022-10-05 NOTE — ED TRIAGE NOTES
The pt arrives via EMS from a group home with suicidal thoughts and also became aggressive breaking a window and threw and Ipad. She reported that she was mad at her caregiver and threatened to kill her as well. Pt calm in triage.      Triage Assessment     Row Name 10/05/22 5688       Triage Assessment (Adult)    Airway WDL WDL       Cognitive/Neuro/Behavioral WDL    Cognitive/Neuro/Behavioral WDL WDL

## 2022-10-06 NOTE — DISCHARGE INSTRUCTIONS
"Aftercare Plan  If I am feeling unsafe or I am in a crisis, I will:   Contact my established care providers   Call the National Suicide Prevention Lifeline: 988  Go to the nearest emergency room   Call 911      Warning signs that I or other people might notice when a crisis is developing for me: \"Not feeling myself.  Having more behaviors.  I don't like it.\"     Things I am able to do on my own to cope or help me feel better: \"I need to learn more coping.\"     Things that I am able to do with others to cope or help me better: \"Take my prn\"     Things I can use or do for distraction: Singing and talking to other people.      Changes I can make to support my mental health and wellness: \"Get therapy\"     People in my life that I can ask for help: Mom,  and staff      Your [facility] has a mental health crisis team you can call 24/7: [facility] Mobile Crisis  [phone]       Additional resources and information: Continue working with your providers and discuss with your guardian options to begin therapy.           Crisis Lines  Crisis Text Line  Text 173537  You will be connected with a trained live crisis counselor to provide support.     Por espanol, texto  SUE a 362634 o texto a 442-AYUDAME en WhatsApp     The Ricky Project (LGBTQ Youth Crisis Line)  1.808.629.3160  text START to 637-345        Community Resources  Fast Tracker  Linking people to mental health and substance use disorder resources  fasttrackermn.org      [place] Mental Health Warm Line  Peer to peer support  Monday thru Saturday, 12 pm to 10 pm  748.062.8492 or 5.763.993.1037  Text \"Support\" to 98094     National [place] on Mental Illness (YANIV)  239.973.2469 or 1.888.YANIV.HELPS        Mental Health Apps  My3  https://my3app.org/     VirtualHopeBox  https://Border [employer]lo.org/apps/virtual-hope-box/        Additional Information  Today you were seen by a licensed mental health professional through Triage and " Transition services, Behavioral Healthcare Providers ([facility])  for a crisis assessment in the Emergency Department at [facility].  It is recommended that you follow up with your established providers (psychiatrist, mental health therapist, and/or primary care doctor - as relevant) as soon as possible. Coordinators from [facility] will be calling you in the next 24-48 hours to ensure that you have the resources you need.  You can also contact [facility] coordinators directly at [phone]. You may have been scheduled for or offered an appointment with a mental health provider. [facility] maintains an extensive network of licensed behavioral health providers to connect patients with the services they need.  We do not charge providers a fee to participate in our referral network.  We match patients with providers based on a patient's specific needs, insurance coverage, and location.  Our first effort will be to refer you to a provider within your care system, and will utilize providers outside your care system as needed.
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
## 2022-10-24 RX ORDER — DIVALPROEX SODIUM 500 MG/1
TABLET, EXTENDED RELEASE ORAL
Qty: 112 TABLET | Refills: 12 | OUTPATIENT
Start: 2022-10-24

## 2022-10-24 RX ORDER — PROPRANOLOL HCL 60 MG
CAPSULE, EXTENDED RELEASE 24HR ORAL
Qty: 28 CAPSULE | Refills: 12 | OUTPATIENT
Start: 2022-10-24

## 2022-10-24 NOTE — TELEPHONE ENCOUNTER
I don't see either of these on her med list or that she has been prescribed these before. I'm not sure if these are new from her psychiatrist or neurologist? Please call and confirm.  I also recommend she schedule yearly preventive visit.  Katelyn Travis PA-C

## 2022-10-24 NOTE — TELEPHONE ENCOUNTER
"Requested Prescriptions   Pending Prescriptions Disp Refills     divalproex sodium extended-release (DEPAKOTE ER) 500 MG 24 hr tablet [Pharmacy Med Name: Divalproex Sodium  MG Tablet extended release 24 hr] 112 tablet 12     Sig: TAKE 4 TABLETS BY MOUTH AT BEDTIME (2000MG) *HAZARDOUS DRUG*       Anti-Seizure Meds Protocol  Failed - 10/24/2022 11:44 AM        Failed - Review Authorizing provider's last note.      Refer to last progress notes: confirm request is for original authorizing provider (cannot be through other providers).          Failed - Depakote level within therapeutic range in past 26 months     No results found for: VALPROIC, CATINA, GICHVAL  Depakote level must be checked 2-4 weeks after dosage change.          Failed - Medication is active on med list        Passed - Recent (12 mo) or future (30 days) visit within the authorizing provider's specialty     Patient has had an office visit with the authorizing provider or a provider within the authorizing providers department within the previous 12 mos or has a future within next 30 days. See \"Patient Info\" tab in inbasket, or \"Choose Columns\" in Meds & Orders section of the refill encounter.              Passed - Normal CBC on file in past 26 months     Recent Labs   Lab Test 08/23/22  1418   WBC 7.6   RBC 4.35   HGB 12.4   HCT 39.6                    Passed - Normal ALT or AST on file in past 26 months     Recent Labs   Lab Test 03/19/21  1029   ALT 35     Recent Labs   Lab Test 03/19/21  1029   AST 18             Passed - Normal platelet count on file in past 26 months     Recent Labs   Lab Test 08/23/22  1418                  Passed - No active pregnancy on record        Passed - No positive pregnancy test in last 12 months           propranolol ER (INDERAL LA) 60 MG 24 hr capsule [Pharmacy Med Name: Propranolol HCl ER 60 MG Capsule extended release 24 hr] 28 capsule 12     Sig: TAKE 1 CAPSULE BY MOUTH DAILY *START 10/11/2022*       " "Beta-Blockers Protocol Failed - 10/24/2022 11:44 AM        Failed - Medication is active on med list        Passed - Blood pressure under 140/90 in past 12 months     BP Readings from Last 3 Encounters:   10/05/22 124/61   08/21/22 (!) 147/98   05/06/22 125/71                 Passed - Patient is age 6 or older        Passed - Recent (12 mo) or future (30 days) visit within the authorizing provider's specialty     Patient has had an office visit with the authorizing provider or a provider within the authorizing providers department within the previous 12 mos or has a future within next 30 days. See \"Patient Info\" tab in inbasket, or \"Choose Columns\" in Meds & Orders section of the refill encounter.                   "

## 2022-10-25 NOTE — TELEPHONE ENCOUNTER
Patient returned call.  Yvonne on the phone as well. Yvonne states the refills for depakote and propranolol should've been sent to psych so she will take care of that. Advised patient schedule yearly  preventative visit as well. They verbalized understanding.   Yamilet CORRAL RN

## 2022-10-25 NOTE — TELEPHONE ENCOUNTER
Call placed to Patient   No answer  Left message with call back number for Patient to return call  Sergio Quintanilla RN

## 2022-10-28 ENCOUNTER — TELEPHONE (OUTPATIENT)
Dept: FAMILY MEDICINE | Facility: CLINIC | Age: 27
End: 2022-10-28

## 2022-10-28 DIAGNOSIS — R19.7 DIARRHEA OF PRESUMED INFECTIOUS ORIGIN: Primary | ICD-10-CM

## 2022-10-28 NOTE — TELEPHONE ENCOUNTER
Left message for Yvonne to call back, she schedules for the patient.    Called patient, Yvonne is with her now but is not able to speak to care team, asked that the patient ask Yvonne to call back.    AMY Husain

## 2022-10-28 NOTE — TELEPHONE ENCOUNTER
I reviewed discharge notes from 10/10/22  Restarted Effexor XR 150mg daily, levothyroxine 75mcg daily.  Lithobid consolidated from TID to 1350mg qHS  Depakote 2000mg started  Discontinued Trileptal  Inderalla 60mg daily started    Those can be medication side effects but it sounds like she may have a viral infection.  Could put her in at 12:40 appointment time for virtual visit or on Monday virtual opening. Either way would recommend she picks up stool cultures today.  Katelyn Travis PA-C

## 2022-10-28 NOTE — TELEPHONE ENCOUNTER
Call placed to Patient  No answer  Left message with call back number for Patient to return call.  Sergio Quintanilla RN

## 2022-10-28 NOTE — TELEPHONE ENCOUNTER
Su Travis:    Patient called the clinic back, she is with her staff care giver Marta. Patient told the nurse that for the past week she has been having more headaches and diarrhea since coming home from the hospital. Patient thinks this is related to medication changes made in the hospital, but can not state which medications were changed, says some were discontinued as well. Reports she has watery diarrhea 5 times per day and intermittent nausea for the past week. She reports the headaches are off and on and can be migraines, denies that she has a fever or abdominal pain, but does says she feels mild abdominal pressure. Says she is using tylenol and ibuprofen. Marta adds the patient has been dealing with chronic yeast infection and says the patient holds her urine. Requests that care team call Yvonne back for any updates and scheduling at [phone]. Do you want to work patient in today or Monday? Patient says she is keeping hydrated and urinating. Could this be related to medication changes made in hospital?      AMY Husain

## 2022-10-28 NOTE — TELEPHONE ENCOUNTER
Reason for Call:  Appointment Request    Patient requesting this type of appt:  headaches and diarrhea    Requested provider: Su Travis    Reason patient unable to be scheduled: Not within requested timeframe    When does patient want to be seen/preferred time: Same day    Comments: headaches and diarrhea    [facility] or would you prefer to receive a phone call?:   Patient would prefer a phone call   Okay to leave a detailed message?: Yes at Other phone number:  [phone]    Call taken on 10/28/2022 at 8:05 AM by Didi Montague

## 2022-11-01 RX ORDER — PROPRANOLOL HCL 60 MG
CAPSULE, EXTENDED RELEASE 24HR ORAL
Qty: 28 CAPSULE | Refills: 12 | OUTPATIENT
Start: 2022-11-01

## 2022-11-01 NOTE — TELEPHONE ENCOUNTER
Call placed to Yvonne.  Left voicemail message requesting call back to the care team regarding patient,recommendations per PCP.  Call back number given.  Genny Canales RN

## 2022-11-01 NOTE — TELEPHONE ENCOUNTER
Last week RN talked with  who said psychiatry fills these and they were planning to contact psychiatry for refill.  Katelyn Travis PA-C

## 2022-11-01 NOTE — TELEPHONE ENCOUNTER
Routing refill request to provider for review/approval because:  Drug not active on patient's medication list  Thank you.  Munir Pena RN

## 2022-11-02 ENCOUNTER — NURSE TRIAGE (OUTPATIENT)
Dept: NURSING | Facility: CLINIC | Age: 27
End: 2022-11-02

## 2022-11-02 NOTE — TELEPHONE ENCOUNTER
Jane calls and says that she has had a headache since the beginning of October. Pt. Also has the diarrhea. Pt. Says that she still has her yeast infection and UTI. Pt. Says that those infections are not going away. Pt. Says that she just moved to this [facility] -in [place]-yesterday. Pt. Says that she needs her flu shot and her Depo Shot. Pt. Says that no one will take her to see a Dr. RN then spoke to a group home employee and this employee says that she will call the manager, so pt. Can go to see a Dr. Tonight. COVID 19 Nurse Triage Plan/Patient Instructions    Please be aware that novel coronavirus (COVID-19) may be circulating in the community. If you develop symptoms such as fever, cough, or SOB or if you have concerns about the presence of another infection including coronavirus (COVID-19), please contact your health care provider or visit [email or web address].     Disposition/Instructions    In-Person Visit with provider recommended. Reference Visit Selection Guide.    Thank you for taking steps to prevent the spread of this virus.  o Limit your contact with others.  o Wear a simple mask to cover your cough.  o Wash your hands well and often.    Resources    [facility]: About COVID-19: [email or web address]    CDC: What to Do If You're Sick: www.cdc.gov/coronavirus/2019-ncov/about/steps-when-sick.html    CDC: Ending Home Isolation: www.cdc.gov/coronavirus/2019-ncov/hcp/disposition-in-home-patients.html     CDC: Caring for Someone: www.cdc.gov/coronavirus/2019-ncov/if-you-are-sick/care-for-someone.html     [facility]: Interim Guidance for Hospital Discharge to Home: www.health.[facility].[place].us/diseases/coronavirus/hcp/hospdischarge.pdf    [facility] clinical trials (COVID-19 research studies): [email or web address].[email or web address]-trials     Below are the COVID-19 hotlines at the [place] Department of Health ([facility]). Interpreters are available.   o For health  "questions: Call 289-316-0729 or 1-835.653.7443 (7 a.m. to 7 p.m.)  o For questions about schools and childcare: Call 972-802-6707 or 1-537.355.8785 (7 a.m. to 7 p.m.)                     Reason for Disposition    [1] SEVERE headache (e.g., excruciating) AND [2] not improved after 2 hours of pain medicine    Additional Information    Negative: Difficult to awaken or acting confused (e.g., disoriented, slurred speech)    Negative: [1] Weakness of the face, arm or leg on one side of the body AND [2] new-onset    Negative: [1] Numbness of the face, arm or leg on one side of the body AND [2] new-onset    Negative: [1] Loss of speech or garbled speech AND [2] new-onset    Negative: Passed out (i.e., lost consciousness, collapsed and was not responding)    Negative: Sounds like a life-threatening emergency to the triager    Negative: Followed a head injury    Negative: Pregnant    Negative: Postpartum (from 0 to 6 weeks after delivery)    Negative: Traumatic Brain Injury (TBI) is suspected    Negative: Unable to walk, or can only walk with assistance (e.g., requires support)    Negative: Stiff neck (can't touch chin to chest)    Negative: Severe pain in one eye    Negative: [1] Other family members (or roommates) with headaches AND [2] possibility of carbon monoxide exposure    Negative: [1] SEVERE headache (e.g., excruciating) AND [2] \"worst headache\" of life    Negative: [1] SEVERE headache AND [2] sudden-onset (i.e., reaching maximum intensity within seconds to 1 hour)    Negative: [1] SEVERE headache AND [2] fever    Negative: Loss of vision or double vision (Exception: same as prior migraines)    Negative: [1] Fever > 100.0 F (37.8 C) AND [2] diabetes mellitus or weak immune system (e.g., HIV positive, cancer chemo, splenectomy, organ transplant, chronic steroids)    Negative: Patient sounds very sick or weak to the triager    Protocols used: HEADACHE-A-AH      "

## 2022-11-04 ENCOUNTER — OFFICE VISIT (OUTPATIENT)
Dept: FAMILY MEDICINE | Facility: CLINIC | Age: 27
End: 2022-11-04
Payer: MEDICAID

## 2022-11-04 VITALS
OXYGEN SATURATION: 98 % | TEMPERATURE: 98.3 F | RESPIRATION RATE: 16 BRPM | WEIGHT: 284 LBS | HEART RATE: 64 BPM | HEIGHT: 61 IN | BODY MASS INDEX: 53.62 KG/M2

## 2022-11-04 DIAGNOSIS — R51.9 ACUTE INTRACTABLE HEADACHE, UNSPECIFIED HEADACHE TYPE: Primary | ICD-10-CM

## 2022-11-04 DIAGNOSIS — Z23 NEEDS FLU SHOT: ICD-10-CM

## 2022-11-04 DIAGNOSIS — R35.0 URINARY FREQUENCY: ICD-10-CM

## 2022-11-04 DIAGNOSIS — Z30.42 SURVEILLANCE OF CONTRACEPTIVE INJECTION: ICD-10-CM

## 2022-11-04 DIAGNOSIS — N89.8 VAGINAL DISCHARGE: ICD-10-CM

## 2022-11-04 DIAGNOSIS — R56.9 SEIZURES (H): ICD-10-CM

## 2022-11-04 DIAGNOSIS — R19.7 DIARRHEA, UNSPECIFIED TYPE: ICD-10-CM

## 2022-11-04 DIAGNOSIS — R30.0 DYSURIA: ICD-10-CM

## 2022-11-04 LAB
ALBUMIN SERPL BCG-MCNC: 4.5 G/DL (ref 3.5–5.2)
ALBUMIN UR-MCNC: NEGATIVE MG/DL
ALP SERPL-CCNC: 47 U/L (ref 35–104)
ALT SERPL W P-5'-P-CCNC: 28 U/L (ref 10–35)
ANION GAP SERPL CALCULATED.3IONS-SCNC: 10 MMOL/L (ref 7–15)
APPEARANCE UR: CLEAR
AST SERPL W P-5'-P-CCNC: 17 U/L (ref 10–35)
BACTERIA #/AREA URNS HPF: ABNORMAL /HPF
BILIRUB SERPL-MCNC: 0.4 MG/DL
BILIRUB UR QL STRIP: NEGATIVE
BUN SERPL-MCNC: 9.7 MG/DL (ref 6–20)
CALCIUM SERPL-MCNC: 9.6 MG/DL (ref 8.6–10)
CHLORIDE SERPL-SCNC: 111 MMOL/L (ref 98–107)
CLUE CELLS: ABNORMAL
COLOR UR AUTO: YELLOW
CREAT SERPL-MCNC: 0.63 MG/DL (ref 0.51–0.95)
DEPRECATED HCO3 PLAS-SCNC: 22 MMOL/L (ref 22–29)
ERYTHROCYTE [DISTWIDTH] IN BLOOD BY AUTOMATED COUNT: 12.5 % (ref 10–15)
GFR SERPL CREATININE-BSD FRML MDRD: >90 ML/MIN/1.73M2
GLUCOSE SERPL-MCNC: 102 MG/DL (ref 70–99)
GLUCOSE UR STRIP-MCNC: NEGATIVE MG/DL
HCG UR QL: NEGATIVE
HCT VFR BLD AUTO: 41.4 % (ref 35–47)
HGB BLD-MCNC: 13.3 G/DL (ref 11.7–15.7)
HGB UR QL STRIP: NEGATIVE
KETONES UR STRIP-MCNC: NEGATIVE MG/DL
LEUKOCYTE ESTERASE UR QL STRIP: ABNORMAL
MCH RBC QN AUTO: 29.4 PG (ref 26.5–33)
MCHC RBC AUTO-ENTMCNC: 32.1 G/DL (ref 31.5–36.5)
MCV RBC AUTO: 91 FL (ref 78–100)
NITRATE UR QL: NEGATIVE
PH UR STRIP: 7 [PH] (ref 5–7)
PLATELET # BLD AUTO: 189 10E3/UL (ref 150–450)
POTASSIUM SERPL-SCNC: 4.1 MMOL/L (ref 3.4–5.3)
PROT SERPL-MCNC: 6.8 G/DL (ref 6.4–8.3)
RBC # BLD AUTO: 4.53 10E6/UL (ref 3.8–5.2)
RBC #/AREA URNS AUTO: ABNORMAL /HPF
SODIUM SERPL-SCNC: 143 MMOL/L (ref 136–145)
SP GR UR STRIP: 1.01 (ref 1–1.03)
SQUAMOUS #/AREA URNS AUTO: ABNORMAL /LPF
TRICHOMONAS, WET PREP: ABNORMAL
UROBILINOGEN UR STRIP-ACNC: 0.2 E.U./DL
VALPROATE SERPL-MCNC: 77.1 UG/ML
WBC # BLD AUTO: 8.6 10E3/UL (ref 4–11)
WBC #/AREA URNS AUTO: ABNORMAL /HPF
WBC'S/HIGH POWER FIELD, WET PREP: ABNORMAL
YEAST, WET PREP: ABNORMAL

## 2022-11-04 PROCEDURE — 87210 SMEAR WET MOUNT SALINE/INK: CPT | Performed by: FAMILY MEDICINE

## 2022-11-04 PROCEDURE — 90686 IIV4 VACC NO PRSV 0.5 ML IM: CPT | Performed by: FAMILY MEDICINE

## 2022-11-04 PROCEDURE — 80053 COMPREHEN METABOLIC PANEL: CPT | Performed by: FAMILY MEDICINE

## 2022-11-04 PROCEDURE — 90471 IMMUNIZATION ADMIN: CPT | Performed by: FAMILY MEDICINE

## 2022-11-04 PROCEDURE — 81025 URINE PREGNANCY TEST: CPT | Performed by: FAMILY MEDICINE

## 2022-11-04 PROCEDURE — 80164 ASSAY DIPROPYLACETIC ACD TOT: CPT | Performed by: FAMILY MEDICINE

## 2022-11-04 PROCEDURE — 36415 COLL VENOUS BLD VENIPUNCTURE: CPT | Performed by: FAMILY MEDICINE

## 2022-11-04 PROCEDURE — 99214 OFFICE O/P EST MOD 30 MIN: CPT | Mod: 25 | Performed by: FAMILY MEDICINE

## 2022-11-04 PROCEDURE — 81001 URINALYSIS AUTO W/SCOPE: CPT | Performed by: FAMILY MEDICINE

## 2022-11-04 PROCEDURE — 96372 THER/PROPH/DIAG INJ SC/IM: CPT | Performed by: PHYSICIAN ASSISTANT

## 2022-11-04 PROCEDURE — 85027 COMPLETE CBC AUTOMATED: CPT | Performed by: FAMILY MEDICINE

## 2022-11-04 RX ORDER — LOPERAMIDE HYDROCHLORIDE 2 MG/1
2-4 TABLET ORAL 4 TIMES DAILY PRN
Qty: 30 TABLET | Refills: 1 | Status: SHIPPED | OUTPATIENT
Start: 2022-11-04

## 2022-11-04 RX ORDER — DIVALPROEX SODIUM 500 MG/1
2000 TABLET, EXTENDED RELEASE ORAL
COMMUNITY
Start: 2022-10-10 | End: 2023-10-10

## 2022-11-04 RX ORDER — ALPRAZOLAM 1 MG
1 TABLET ORAL
COMMUNITY
Start: 2022-10-10 | End: 2024-04-08

## 2022-11-04 RX ORDER — PROPRANOLOL HCL 60 MG
60 CAPSULE, EXTENDED RELEASE 24HR ORAL
COMMUNITY
Start: 2022-10-11 | End: 2023-10-11

## 2022-11-04 RX ORDER — MEDROXYPROGESTERONE ACETATE 150 MG/ML
150 INJECTION, SUSPENSION INTRAMUSCULAR
COMMUNITY
Start: 2022-10-10 | End: 2022-11-04

## 2022-11-04 RX ORDER — FAMOTIDINE 20 MG/1
20 TABLET, FILM COATED ORAL
COMMUNITY
Start: 2022-10-10 | End: 2022-11-04

## 2022-11-04 RX ADMIN — MEDROXYPROGESTERONE ACETATE 150 MG: 150 INJECTION, SUSPENSION INTRAMUSCULAR at 16:11

## 2022-11-04 ASSESSMENT — ANXIETY QUESTIONNAIRES
4. TROUBLE RELAXING: NEARLY EVERY DAY
3. WORRYING TOO MUCH ABOUT DIFFERENT THINGS: NEARLY EVERY DAY
1. FEELING NERVOUS, ANXIOUS, OR ON EDGE: NEARLY EVERY DAY
6. BECOMING EASILY ANNOYED OR IRRITABLE: NEARLY EVERY DAY
2. NOT BEING ABLE TO STOP OR CONTROL WORRYING: NEARLY EVERY DAY
7. FEELING AFRAID AS IF SOMETHING AWFUL MIGHT HAPPEN: NEARLY EVERY DAY
GAD7 TOTAL SCORE: 21
GAD7 TOTAL SCORE: 21
5. BEING SO RESTLESS THAT IT IS HARD TO SIT STILL: NEARLY EVERY DAY
8. IF YOU CHECKED OFF ANY PROBLEMS, HOW DIFFICULT HAVE THESE MADE IT FOR YOU TO DO YOUR WORK, TAKE CARE OF THINGS AT HOME, OR GET ALONG WITH OTHER PEOPLE?: EXTREMELY DIFFICULT
GAD7 TOTAL SCORE: 21
IF YOU CHECKED OFF ANY PROBLEMS ON THIS QUESTIONNAIRE, HOW DIFFICULT HAVE THESE PROBLEMS MADE IT FOR YOU TO DO YOUR WORK, TAKE CARE OF THINGS AT HOME, OR GET ALONG WITH OTHER PEOPLE: EXTREMELY DIFFICULT
7. FEELING AFRAID AS IF SOMETHING AWFUL MIGHT HAPPEN: NEARLY EVERY DAY

## 2022-11-04 ASSESSMENT — PAIN SCALES - GENERAL: PAINLEVEL: NO PAIN (0)

## 2022-11-04 ASSESSMENT — PATIENT HEALTH QUESTIONNAIRE - PHQ9
10. IF YOU CHECKED OFF ANY PROBLEMS, HOW DIFFICULT HAVE THESE PROBLEMS MADE IT FOR YOU TO DO YOUR WORK, TAKE CARE OF THINGS AT HOME, OR GET ALONG WITH OTHER PEOPLE: EXTREMELY DIFFICULT
SUM OF ALL RESPONSES TO PHQ QUESTIONS 1-9: 14
SUM OF ALL RESPONSES TO PHQ QUESTIONS 1-9: 14

## 2022-11-04 ASSESSMENT — ENCOUNTER SYMPTOMS
HEADACHES: 1
DIARRHEA: 1

## 2022-11-04 NOTE — PATIENT INSTRUCTIONS
ASSESSMENT:     ICD-10-CM    1. Acute intractable headache, unspecified headache type  R51.9       2. Surveillance of contraceptive injection  Z30.42 HCG qualitative urine     HCG qualitative urine      3. Vaginal discharge  N89.8 Wet prep - Clinic Collect     Wet prep - Clinic Collect      4. Dysuria  R30.0 UA macro with reflex to Microscopic and Culture - Clinc Collect     Urine Microscopic      5. Seizures (H)  R56.9 CBC with platelets     Comprehensive metabolic panel (BMP + Alb, Alk Phos, ALT, AST, Total. Bili, TP)     Valproic acid     CBC with platelets     Comprehensive metabolic panel (BMP + Alb, Alk Phos, ALT, AST, Total. Bili, TP)     Valproic acid      6. Diarrhea, unspecified type  R19.7 Cryptosporidium and Giardia antigens     Routine parasitology exam     Enteric Bacteria and Virus Panel by RUBIN Stool     C. difficile Toxin B PCR with reflex to C. difficile Antigen and Toxins A/B EIA     Cryptosporidium and Giardia antigens     Routine parasitology exam     Enteric Bacteria and Virus Panel by RUBIN Stool     C. difficile Toxin B PCR with reflex to C. difficile Antigen and Toxins A/B EIA      7. Urinary frequency  R35.0       8. Needs flu shot  Z23 INFLUENZA VACCINE IM > 6 MONTHS VALENT IIV4 (AFLURIA/FLUZONE)         WE are checking tests for causes of headache, specifically the depakote level.   If too hihgh, we can reduce the dose.    If levels OK, you may need to follow-up with psychiatry and neurology to do different medication since your symptoms started with starting this medication.   For diarrhea, bring in stool tests.  Looking for causes.  IN the meantime, loperamide (Imodium) can help.  Try loperamide (Imodium-brand name) 2 pills at first loose stool, then 1 after each loose stool up to maximum of 8 pills a day for symptom relief.  This over the counter medication is available without a prescription.    No sign of urinary tract infection.   Vaginal test pending at his time.   Depo shot today.    Flu shot today.

## 2022-11-04 NOTE — PROGRESS NOTES
Clinic Administered Medication Documentation    Administrations This Visit     medroxyPROGESTERone (DEPO-PROVERA) injection 150 mg     Admin Date  11/04/2022 Action  Given Dose  150 mg Route  Intramuscular Site   Administered By  Tri Nuñez    Ordering Provider: Su Travis PA-C    Patient Supplied?: No                  Depo Provera Documentation    URINE HCG: negative    Depo-Provera Standing Order inclusion/exclusion criteria reviewed.   Patient meets: inclusion criteria     BP: Data Unavailable  LAST PAP/EXAM:   Lab Results   Component Value Date    PAP NIL 03/13/2020       Prior to injection, verified patient identity using patient's name and date of birth. Medication was administered. Please see MAR and medication order for additional information.     Was entire vial of medication used? Yes  Vial/Syringe: Single dose vial  Expiration Date:  04/2024    Patient instructed to '.  NEXT INJECTION DUE: 1/20/23 - 2/3/23

## 2022-11-04 NOTE — PROGRESS NOTES
ASSESSMENT:     ICD-10-CM    1. Acute intractable headache, unspecified headache type  R51.9       2. Surveillance of contraceptive injection  Z30.42 HCG qualitative urine     HCG qualitative urine      3. Vaginal discharge  N89.8 Wet prep - Clinic Collect     Wet prep - Clinic Collect      4. Dysuria  R30.0 UA macro with reflex to Microscopic and Culture - Clinc Collect     Urine Microscopic      5. Seizures (H)  R56.9 CBC with platelets     Comprehensive metabolic panel (BMP + Alb, Alk Phos, ALT, AST, Total. Bili, TP)     Valproic acid     CBC with platelets     Comprehensive metabolic panel (BMP + Alb, Alk Phos, ALT, AST, Total. Bili, TP)     Valproic acid      6. Diarrhea, unspecified type  R19.7 Cryptosporidium and Giardia antigens     Routine parasitology exam     Enteric Bacteria and Virus Panel by RUBIN Stool     C. difficile Toxin B PCR with reflex to C. difficile Antigen and Toxins A/B EIA     Cryptosporidium and Giardia antigens     Routine parasitology exam     Enteric Bacteria and Virus Panel by RUBIN Stool     C. difficile Toxin B PCR with reflex to C. difficile Antigen and Toxins A/B EIA      7. Urinary frequency  R35.0       8. Needs flu shot  Z23 INFLUENZA VACCINE IM > 6 MONTHS VALENT IIV4 (AFLURIA/FLUZONE)         WE are checking tests for causes of headache, specifically the depakote level.   If too hihgh, we can reduce the dose.    If levels OK, you may need to follow-up with psychiatry and neurology to do different medication since your symptoms started with starting this medication.   For diarrhea, bring in stool tests.  Looking for causes.  IN the meantime, loperamide (Imodium) can help.  Try loperamide (Imodium-brand name) 2 pills at first loose stool, then 1 after each loose stool up to maximum of 8 pills a day for symptom relief.  This over the counter medication is available without a prescription.    No sign of urinary tract infection.   Vaginal test pending at his time.   Depo shot today.  "  Flu shot today.     Subjective   Jane is a 27 year old accompanied by her caregiver, presenting for the following health issues:  Chief Complaint   Patient presents with     Diarrhea     Headache     Urinary Problem     Frequency, incontinence    Accompanied today by caregiver.  Marta.  staff    headaches and diarrhea worse since last hospital stay (10/6). Lithium dose changed to once daily.  Olanzapine discontinued.    Depakote was new.    Trileptal discontinued.   Started on propranolol.  Just moved to a different group home.     Head pounding since med change in October.   Daily.  She did not have before.  New problem.  She had migraines previously.  Had one once in a while previously.   Location: bifrontal.  Last all day.  Seems not to sleep well.   Bad headaches   Tried acetaminophen, ibuprofen and sumatriptan.  They do not help.     Headaches:   Since the patient's last clinic visit, headaches are: worsened  The patient is getting headaches:  Everyday  She is not able to do normal daily activities when she has a migraine.  The patient is taking the following rescue/relief medications:  Ibuprofen (Advil, Motrin), Tylenol and sumatriptan (Imitrex)   Patient states \"I get no relief\" from the rescue/relief medications.   The patient is taking the following medications to prevent migraines:  No medications to prevent migraines  In the past 4 weeks, the patient has gone to an Urgent Care or Emergency Room 0 times times due to headaches.    Today's PHQ-9         PHQ-9 Total Score: 14    PHQ-9 Q9 Thoughts of better off dead/self-harm past 2 weeks :   More than half the days  Thoughts of suicide or self harm: (P) Yes  Self-harm Plan:   (P) Yes  Self-harm Action:     (P) Yes  Safety concerns for self or others: (P) No    How difficult have these problems made it for you to do your work, take care of things at home, or get along with other people: Extremely difficult  Today's YARA-7 Score: 21       Diarrhea  Since " hospital stay.   Has bowel movement daily.  Having three stools most days.  Now liquid.  Usual pattern 1-2 per day soft.   No accidents yet but some urgency.   Some abdominal pains better after bowel movement.   NOt taking Miraalax which has been prn.   Onset/Duration: 2 weeks  Description:       Consistency of stool: watery, runny and loose       Blood in stool: No       Number of loose stools past 24 hours: 2  Progression of Symptoms: worsening  Accompanying signs and symptoms:       Fever: No       Nausea/Vomiting: No       Abdominal pain: YES       Weight loss: YES       Episodes of constipation: No  History   Ill contacts: No  Recent use of antibiotics: No  Recent travels: No  Recent medication-new or changes(Rx or OTC): No  Precipitating or alleviating factors: None  Therapies tried and outcome: none    Genitourinary - Female  Can't hold urine.  Urgency.  Sometimes hesitancy.    She has had UTI.    She  Has had vaginal yeast infections in the past.   Onset/Duration: 1 month +.  Some urinary frequency.  She has been having accidents which is new.   Description:   Painful urination (Dysuria): YES           Frequency: YES  Blood in urine (Hematuria): No  Delay in urine (Hesitency): YES  Intensity: moderate  Progression of Symptoms:  worsening  Accompanying Signs & Symptoms:  Fever/chills: No  Flank pain: No  Nausea and vomiting: No  Vaginal symptoms: discharge-white, chunky, odor and itching  Abdominal/Pelvic Pain: No  History:   History of frequent UTI s: YES  History of kidney stones: No  Sexually Active: No  Possibility of pregnancy: No  Precipitating or alleviating factors: None  Therapies tried and outcome: Increase fluid intake    Patient Active Problem List   Diagnosis     Morbid obesity (H)     Autism spectrum disorder     Moderate episode of recurrent major depressive disorder (H)     Hypothyroidism, unspecified type     YARA (generalized anxiety disorder)     Mild intellectual disability     Attention  "deficit hyperactivity disorder (ADHD), unspecified ADHD type     Oppositional defiant disorder     History of seizures     Lactose intolerance     Migraine without aura and without status migrainosus, not intractable     Non-seasonal allergic rhinitis due to pollen     Gastroesophageal reflux disease without esophagitis     Constipation, unspecified constipation type     Lives in group home     Polycystic ovary syndrome     Peripheral vision loss, bilateral     Impaired physical mobility     Hirsutism     Eczema     Borderline personality disorder (H)     Seizures (H)        Review of Systems   Gastrointestinal: Positive for diarrhea.   Neurological: Positive for headaches.      OBJECTIVE:Pulse 64, temperature 98.3  F (36.8  C), temperature source Tympanic, resp. rate 16, height 1.549 m (5' 1\"), weight 128.8 kg (284 lb), SpO2 98 %, not currently breastfeeding. BMI=Body mass index is 53.66 kg/m .  GENERAL APPEARANCE ADULT: Alert, no acute distress, she rocks in her chair while we are talking.  PERRL, EOM normal, conjunctiva and lids normal  ENT:Ears and TMs normal, oral mucosa and posterior oropharynx normal   (female): external genitalia normal, no visible discharge.  I attempted speculum exam but she was uncomfortable and I was unable to barely look inside the introitus.  I did not see any discharge but did a wet prep swab.  MS: extremities normal, no peripheral edema   UA unremarkable.   Wet prep pending.   ============================  She eats 0-1 servings of fruits and vegetables daily.She consumes 3 sweetened beverage(s) daily.She exercises with enough effort to increase her heart rate 60 or more minutes per day.  She exercises with enough effort to increase her heart rate 6 days per week.   She is taking medications regularly.      "

## 2022-11-04 NOTE — LETTER
November 14, 2022      Jane Lee  [address] [place] [address]        Dear ,    We are writing to inform you of your test results.    Valproic level is in the therapeutic range and not too high.   The blood chemistries (Basic metabolic panel) are all normal including electrolytes (salt balances in the blood), blood glucose, liver and kidney tests.   Your blood counts including the white blood cells, red blood cells and platelets are all normal.     Pregnancy test was negative.   Urine test was No other significant past medical history or family history..   Vaginal test was also normal without signs of yeast or infection.   Awaiting stool tests.         Resulted Orders   Wet prep - Clinic Collect   Result Value Ref Range    Trichomonas Absent Absent    Yeast Absent Absent    Clue Cells Absent Absent    WBCs/high power field 2+ (A) None   UA macro with reflex to Microscopic and Culture - Clinc Collect   Result Value Ref Range    Color Urine Yellow Colorless, Straw, Light Yellow, Yellow    Appearance Urine Clear Clear    Glucose Urine Negative Negative, 1000 , >=2000 mg/dL    Bilirubin Urine Negative Negative    Ketones Urine Negative Negative, 160  mg/dL    Specific Gravity Urine 1.015 1.003 - 1.035    Blood Urine Negative Negative    pH Urine 7.0 5.0 - 7.0    Protein Albumin Urine Negative Negative, 300 , >=2000 mg/dL    Urobilinogen Urine 0.2 0.2, 1.0 E.U./dL    Nitrite Urine Negative Negative    Leukocyte Esterase Urine Trace (A) Negative   HCG qualitative urine   Result Value Ref Range    hCG Urine Qualitative Negative Negative      Comment:      This test is for screening purposes.  Results should be interpreted along with the clinical picture.  Confirmation testing is available if warranted by ordering EFB159, HCG Quantitative Pregnancy.   Urine Microscopic   Result Value Ref Range    Bacteria Urine Few (A) None Seen /HPF    RBC Urine 0-2 0-2 /HPF /HPF    WBC Urine 0-5 0-5 /HPF /HPF     Squamous Epithelials Urine Few (A) None Seen /LPF    Narrative    Urine Culture not indicated   CBC with platelets   Result Value Ref Range    WBC Count 8.6 4.0 - 11.0 10e3/uL    RBC Count 4.53 3.80 - 5.20 10e6/uL    Hemoglobin 13.3 11.7 - 15.7 g/dL    Hematocrit 41.4 35.0 - 47.0 %    MCV 91 78 - 100 fL    MCH 29.4 26.5 - 33.0 pg    MCHC 32.1 31.5 - 36.5 g/dL    RDW 12.5 10.0 - 15.0 %    Platelet Count 189 150 - 450 10e3/uL   Comprehensive metabolic panel (BMP + Alb, Alk Phos, ALT, AST, Total. Bili, TP)   Result Value Ref Range    Sodium 143 136 - 145 mmol/L    Potassium 4.1 3.4 - 5.3 mmol/L    Chloride 111 (H) 98 - 107 mmol/L    Carbon Dioxide (CO2) 22 22 - 29 mmol/L    Anion Gap 10 7 - 15 mmol/L    Urea Nitrogen 9.7 6.0 - 20.0 mg/dL    Creatinine 0.63 0.51 - 0.95 mg/dL    Calcium 9.6 8.6 - 10.0 mg/dL    Glucose 102 (H) 70 - 99 mg/dL    Alkaline Phosphatase 47 35 - 104 U/L    AST 17 10 - 35 U/L      Comment:      Specimen is hemolyzed which can falsely elevate AST. Analysis of a non-hemolyzed specimen may result in a lower value.    ALT 28 10 - 35 U/L    Protein Total 6.8 6.4 - 8.3 g/dL    Albumin 4.5 3.5 - 5.2 g/dL    Bilirubin Total 0.4 <=1.2 mg/dL    GFR Estimate >90 >60 mL/min/1.73m2      Comment:      Effective December 21, 2021 eGFRcr in adults is calculated using the 2021 CKD-EPI creatinine equation which includes age and gender (Nayana et al., NEJ, DOI: 10.1056/BLMBgx2990412)   Valproic acid   Result Value Ref Range    Valproic acid 77.1   ug/mL      Comment:      Therapeutic Range:  ug/mL   Epilepsy and zuly patients:  ug/mL   Some patients require and can tolerate values up to 150 ug/mL     Critical: Greater than 150 ug/mL       If you have any questions or concerns, please call the clinic at the number listed above.       Sincerely,      Jaxon Jackson MD

## 2022-11-04 NOTE — PROGRESS NOTES
"  {PROVIDER CHARTING PREFERENCE:691558}    Subjective   Jane is a 27 year old, presenting for the following health issues:  Diarrhea and Headache      HPI     Diarrhea  Onset/Duration: ***  Description:       Consistency of stool: {description:328744}       Blood in stool: {.:792217::\"No\"}       Number of loose stools past 24 hours: ***  Progression of Symptoms: {.:484469}  Accompanying signs and symptoms:       Fever: {.:594246::\"No\"}       Nausea/Vomiting: {.:878082::\"No\"}       Abdominal pain: {.:405467::\"No\"}       Weight loss: {.:293841::\"No\"}       Episodes of constipation: {.:517962::\"No\"}  History   Ill contacts: {.:824083::\"No\"}  Recent use of antibiotics: {.:545981::\"No\"}  Recent travels: {.:875137::\"No\"}  Recent medication-new or changes(Rx or OTC): {.:651299::\"No\"}  Precipitating or alleviating factors: {NONEORCHOOSE:240942::\"None\"}  Therapies tried and outcome: {DIARRHEA THERAPY 2:512364}    Concern - Headache  Onset: ***  Description: ***  Intensity: {.:356458}  Progression of Symptoms:  {.:021805}  Accompanying Signs & Symptoms: ***  Previous history of similar problem: ***  Precipitating factors:        Worsened by: ***  Alleviating factors:        Improved by: ***  Therapies tried and outcome: {NONE DEFAULTED:776410::\" none \"}    {additonal problems for provider to add (Optional):954397}    Review of Systems   {ROS COMP (Optional):705945}      Objective    There were no vitals taken for this visit.  There is no height or weight on file to calculate BMI.  Physical Exam   {Exam List (Optional):951761}    {Diagnostic Test Results (Optional):527398}    {AMBULATORY ATTESTATION (Optional):611164}            " Adult

## 2022-11-07 ENCOUNTER — TRANSFERRED RECORDS (OUTPATIENT)
Dept: HEALTH INFORMATION MANAGEMENT | Facility: CLINIC | Age: 27
End: 2022-11-07

## 2022-11-08 NOTE — RESULT ENCOUNTER NOTE
Hi Jane and staff,   Valproic level is in the therapeutic range and not too high.   The blood chemistries (Basic metabolic panel) are all normal including electrolytes (salt balances in the blood), blood glucose, liver and kidney tests.   Your blood counts including the white blood cells, red blood cells and platelets are all normal.     Pregnancy test was negative.   Urine test was No other significant past medical history or family history..   Vaginal test was also normal without signs of yeast or infection.   Awaiting stool tests.    IVANNA SCHERER MD

## 2022-11-14 ENCOUNTER — TELEPHONE (OUTPATIENT)
Dept: SURGERY | Facility: CLINIC | Age: 27
End: 2022-11-14

## 2022-11-14 NOTE — TELEPHONE ENCOUNTER
This writer was notified that patient would had questions in regards to weight loss and nutrition.  Attempts made to contact patient, no answer.  Therefore left a message to call and schedule an appointment with this writer to discuss further, provided phone number to do so.

## 2022-11-15 ENCOUNTER — TELEPHONE (OUTPATIENT)
Dept: FAMILY MEDICINE | Facility: CLINIC | Age: 27
End: 2022-11-15

## 2022-11-15 NOTE — TELEPHONE ENCOUNTER
Patient calls reporting a possible UTI.  Reports some urinary incontinence, burning and itching.    Advised Urgent Care as no available appointment today.    Did schedule an appointment for Friday in [facility].    Rachel REYES

## 2022-11-15 NOTE — TELEPHONE ENCOUNTER
Pt called to say she cannot wait until Friday when she sees Su Manriquez on Friday. Pt was advised to go to Urgent Care if she feels she has another UTI, pt says staff at [facility] cannot bring her tonight, she will see if they can bring her tomorrow.

## 2022-11-17 DIAGNOSIS — Z79.899 HIGH RISK MEDICATION USE: Primary | ICD-10-CM

## 2022-11-18 ENCOUNTER — OFFICE VISIT (OUTPATIENT)
Dept: FAMILY MEDICINE | Facility: CLINIC | Age: 27
End: 2022-11-18
Payer: MEDICAID

## 2022-11-18 VITALS
WEIGHT: 282.4 LBS | RESPIRATION RATE: 17 BRPM | DIASTOLIC BLOOD PRESSURE: 78 MMHG | HEIGHT: 61 IN | BODY MASS INDEX: 53.32 KG/M2 | TEMPERATURE: 99.2 F | OXYGEN SATURATION: 99 % | HEART RATE: 85 BPM | SYSTOLIC BLOOD PRESSURE: 128 MMHG

## 2022-11-18 DIAGNOSIS — E03.9 HYPOTHYROIDISM, UNSPECIFIED TYPE: ICD-10-CM

## 2022-11-18 DIAGNOSIS — N32.81 OVERACTIVE BLADDER: Primary | ICD-10-CM

## 2022-11-18 DIAGNOSIS — N89.8 VAGINAL DISCHARGE: ICD-10-CM

## 2022-11-18 DIAGNOSIS — Z79.899 HIGH RISK MEDICATION USE: ICD-10-CM

## 2022-11-18 DIAGNOSIS — N76.0 BV (BACTERIAL VAGINOSIS): ICD-10-CM

## 2022-11-18 DIAGNOSIS — B96.89 BV (BACTERIAL VAGINOSIS): ICD-10-CM

## 2022-11-18 DIAGNOSIS — N39.41 URGE INCONTINENCE OF URINE: ICD-10-CM

## 2022-11-18 DIAGNOSIS — N89.8 VAGINAL IRRITATION: ICD-10-CM

## 2022-11-18 LAB
ALBUMIN SERPL BCG-MCNC: 4.5 G/DL (ref 3.5–5.2)
ALBUMIN UR-MCNC: NEGATIVE MG/DL
ALP SERPL-CCNC: 48 U/L (ref 35–104)
ALT SERPL W P-5'-P-CCNC: 27 U/L (ref 10–35)
AMMONIA PLAS-SCNC: 77 UMOL/L (ref 11–51)
AMYLASE SERPL-CCNC: 40 U/L (ref 28–100)
APPEARANCE UR: CLEAR
AST SERPL W P-5'-P-CCNC: 18 U/L (ref 10–35)
BASOPHILS # BLD AUTO: 0 10E3/UL (ref 0–0.2)
BASOPHILS NFR BLD AUTO: 1 %
BILIRUB DIRECT SERPL-MCNC: <0.2 MG/DL (ref 0–0.3)
BILIRUB SERPL-MCNC: 0.5 MG/DL
BILIRUB UR QL STRIP: NEGATIVE
CLUE CELLS: PRESENT
COLOR UR AUTO: YELLOW
EOSINOPHIL # BLD AUTO: 0.4 10E3/UL (ref 0–0.7)
EOSINOPHIL NFR BLD AUTO: 6 %
ERYTHROCYTE [DISTWIDTH] IN BLOOD BY AUTOMATED COUNT: 12.6 % (ref 10–15)
GLUCOSE UR STRIP-MCNC: NEGATIVE MG/DL
HCT VFR BLD AUTO: 39.5 % (ref 35–47)
HGB BLD-MCNC: 12.5 G/DL (ref 11.7–15.7)
HGB UR QL STRIP: NEGATIVE
KETONES UR STRIP-MCNC: NEGATIVE MG/DL
LEUKOCYTE ESTERASE UR QL STRIP: NEGATIVE
LYMPHOCYTES # BLD AUTO: 1.9 10E3/UL (ref 0.8–5.3)
LYMPHOCYTES NFR BLD AUTO: 30 %
MCH RBC QN AUTO: 28.9 PG (ref 26.5–33)
MCHC RBC AUTO-ENTMCNC: 31.6 G/DL (ref 31.5–36.5)
MCV RBC AUTO: 91 FL (ref 78–100)
MONOCYTES # BLD AUTO: 0.6 10E3/UL (ref 0–1.3)
MONOCYTES NFR BLD AUTO: 10 %
NEUTROPHILS # BLD AUTO: 3.3 10E3/UL (ref 1.6–8.3)
NEUTROPHILS NFR BLD AUTO: 53 %
NITRATE UR QL: NEGATIVE
PH UR STRIP: 7.5 [PH] (ref 5–7)
PLATELET # BLD AUTO: 199 10E3/UL (ref 150–450)
PROT SERPL-MCNC: 6.7 G/DL (ref 6.4–8.3)
RBC # BLD AUTO: 4.32 10E6/UL (ref 3.8–5.2)
SP GR UR STRIP: 1.01 (ref 1–1.03)
TRICHOMONAS, WET PREP: ABNORMAL
UROBILINOGEN UR STRIP-ACNC: 0.2 E.U./DL
VALPROATE SERPL-MCNC: 59.2 UG/ML
WBC # BLD AUTO: 6.2 10E3/UL (ref 4–11)
WBC'S/HIGH POWER FIELD, WET PREP: ABNORMAL
YEAST, WET PREP: ABNORMAL

## 2022-11-18 PROCEDURE — 85025 COMPLETE CBC W/AUTO DIFF WBC: CPT | Performed by: PHYSICIAN ASSISTANT

## 2022-11-18 PROCEDURE — 36415 COLL VENOUS BLD VENIPUNCTURE: CPT | Performed by: PHYSICIAN ASSISTANT

## 2022-11-18 PROCEDURE — 80076 HEPATIC FUNCTION PANEL: CPT | Performed by: PHYSICIAN ASSISTANT

## 2022-11-18 PROCEDURE — 82140 ASSAY OF AMMONIA: CPT | Performed by: PHYSICIAN ASSISTANT

## 2022-11-18 PROCEDURE — 87210 SMEAR WET MOUNT SALINE/INK: CPT | Performed by: PHYSICIAN ASSISTANT

## 2022-11-18 PROCEDURE — 80164 ASSAY DIPROPYLACETIC ACD TOT: CPT | Performed by: PHYSICIAN ASSISTANT

## 2022-11-18 PROCEDURE — 82150 ASSAY OF AMYLASE: CPT | Performed by: PHYSICIAN ASSISTANT

## 2022-11-18 PROCEDURE — 81003 URINALYSIS AUTO W/O SCOPE: CPT | Performed by: PHYSICIAN ASSISTANT

## 2022-11-18 PROCEDURE — 99214 OFFICE O/P EST MOD 30 MIN: CPT | Performed by: PHYSICIAN ASSISTANT

## 2022-11-18 RX ORDER — LEVOTHYROXINE SODIUM 75 UG/1
75 TABLET ORAL DAILY
Qty: 90 TABLET | Refills: 1 | Status: SHIPPED | OUTPATIENT
Start: 2022-11-18 | End: 2023-04-24

## 2022-11-18 RX ORDER — TOLTERODINE 2 MG/1
2 CAPSULE, EXTENDED RELEASE ORAL DAILY
Qty: 90 CAPSULE | Refills: 1 | Status: SHIPPED | OUTPATIENT
Start: 2022-11-18 | End: 2023-02-01

## 2022-11-18 RX ORDER — MINERAL OIL/HYDROPHIL PETROLAT
OINTMENT (GRAM) TOPICAL PRN
Qty: 420 G | Refills: 1 | Status: SHIPPED | OUTPATIENT
Start: 2022-11-18 | End: 2023-08-25

## 2022-11-18 RX ORDER — METRONIDAZOLE 500 MG/1
500 TABLET ORAL 2 TIMES DAILY
Qty: 14 TABLET | Refills: 0 | Status: SHIPPED | OUTPATIENT
Start: 2022-11-18 | End: 2022-11-25

## 2022-11-18 NOTE — PATIENT INSTRUCTIONS
Start Detrol LA 2 mg for urinary incontinence. Let me know if side effects such as dry eyes/mouth  Schedule appointment with urology/urogynecology      Appointment 2/23/23  Neurology:  Deuce Ramirez MD  Neurology  NPI: [id number]  [address] [place], [place] [address]&&  [place] [address]     Phone: [phone]

## 2022-11-18 NOTE — PROGRESS NOTES
"  Assessment & Plan     ASSESSMENT/PLAN:      ICD-10-CM    1. Overactive bladder  N32.81 tolterodine ER (DETROL LA) 2 MG 24 hr capsule     Adult Uro/Gyn  Referral      2. High risk medication use  Z79.899 Ammonia     Hepatic function panel     CBC with Platelets & Differential     Amylase     Valproic acid      3. Vaginal discharge  N89.8 Wet prep - Clinic Collect      4. Urge incontinence of urine  N39.41 UA macro with reflex to Microscopic and Culture - Clinc Collect     tolterodine ER (DETROL LA) 2 MG 24 hr capsule     Adult Uro/Gyn  Referral      5. Vaginal irritation  N89.8 Adult Uro/Gyn  Referral     mineral oil-hydrophilic petrolatum (AQUAPHOR) external ointment      6. Hypothyroidism, unspecified type  E03.9 levothyroxine (SYNTHROID/LEVOTHROID) 75 MCG tablet      7. BV (bacterial vaginosis)  N76.0 metroNIDAZOLE (FLAGYL) 500 MG tablet    B96.89         Suspect vaginal irritation is from incontinence (uses pads/depends, has frequent accidents). Recommended urogynecology referral, will trial detrol as well as this greatly affects her life/funcitoning. Discussed risks/benefits.    Patient Instructions   Start Detrol LA 2 mg for urinary incontinence. Let me know if side effects such as dry eyes/mouth  Schedule appointment with urology/urogynecology      Appointment 2/23/23  Neurology:   Deuce Ramirez MD   Neurology   NPI: [id number]   [address] [place], [place] [address]&&   [place] [address]       Phone: [phone]     BMI:   Estimated body mass index is 53.39 kg/m  as calculated from the following:    Height as of this encounter: 1.549 m (5' 0.98\").    Weight as of this encounter: 128.1 kg (282 lb 6.4 oz).   Weight management plan: Patient referred to endocrine and/or weight management specialty    Return in about 1 month (around 12/18/2022) for with specialist.    35 minutes spent on the date of the encounter doing chart review, history and exam, documentation and further activities per the " "note   ALEN Ma [facility] LIONEL Lara is a 27 year old accompanied by her caregiver, presenting for the following health issues:  Incontinence      HPI     Genitourinary - Female  Onset/Duration: Off and on for the last couple months  Description:   Painful urination (Dysuria): YES           Frequency: YES  Blood in urine (Hematuria): No  Delay in urine (Hesitency): No  Intensity: moderate  Progression of Symptoms:  worsening  Accompanying Signs & Symptoms:  Fever/chills: No  Flank pain: No  Nausea and vomiting: No  Vaginal symptoms: discharge, odor and itching  Abdominal/Pelvic Pain: No  History:   History of frequent UTI s: YES  History of kidney stones: No  Sexually Active: No  Possibility of pregnancy: No  Precipitating or alleviating factors: None  Therapies tried and outcome: Increase fluid intake and OTC advil or tylenol     Has chronic urge incontinence, frequency, overactive bladder symptoms. No stress incontinence. No previous pregnancy. On depo, no periods.    Still has diarrhea, it is random / no constipation. Has normal stools in between episodes.  Still has headaches, not every day. Would like depakote adjusted - both symptoms started after starting this.  Has labs through psychiatry    Review of Systems   Other than noted above, general, HEENT, respiratory, cardiac, MS, and gastrointestinal systems are negative.       Objective    /78   Pulse 85   Temp 99.2  F (37.3  C) (Tympanic)   Resp 17   Ht 1.549 m (5' 0.98\")   Wt 128.1 kg (282 lb 6.4 oz)   LMP  (LMP Unknown)   SpO2 99%   BMI 53.39 kg/m    Body mass index is 53.39 kg/m .  Physical Exam   GENERAL: healthy, alert and no distress  ABDOMEN: soft, nontender, no hepatosplenomegaly, no masses and bowel sounds normal   (female): normal female external genitalia - unable to perform PAP/speculum/internal exam due to patient's discomfort  MS: no gross musculoskeletal defects noted, no " edema    Results for orders placed or performed in visit on 11/18/22   UA macro with reflex to Microscopic and Culture - Clinc Collect     Status: Abnormal    Specimen: Urine, Midstream   Result Value Ref Range    Color Urine Yellow Colorless, Straw, Light Yellow, Yellow    Appearance Urine Clear Clear    Glucose Urine Negative Negative mg/dL    Bilirubin Urine Negative Negative    Ketones Urine Negative Negative mg/dL    Specific Gravity Urine 1.015 1.003 - 1.035    Blood Urine Negative Negative    pH Urine 7.5 (H) 5.0 - 7.0    Protein Albumin Urine Negative Negative mg/dL    Urobilinogen Urine 0.2 0.2, 1.0 E.U./dL    Nitrite Urine Negative Negative    Leukocyte Esterase Urine Negative Negative    Narrative    Microscopic not indicated   CBC with platelets and differential     Status: None   Result Value Ref Range    WBC Count 6.2 4.0 - 11.0 10e3/uL    RBC Count 4.32 3.80 - 5.20 10e6/uL    Hemoglobin 12.5 11.7 - 15.7 g/dL    Hematocrit 39.5 35.0 - 47.0 %    MCV 91 78 - 100 fL    MCH 28.9 26.5 - 33.0 pg    MCHC 31.6 31.5 - 36.5 g/dL    RDW 12.6 10.0 - 15.0 %    Platelet Count 199 150 - 450 10e3/uL    % Neutrophils 53 %    % Lymphocytes 30 %    % Monocytes 10 %    % Eosinophils 6 %    % Basophils 1 %    Absolute Neutrophils 3.3 1.6 - 8.3 10e3/uL    Absolute Lymphocytes 1.9 0.8 - 5.3 10e3/uL    Absolute Monocytes 0.6 0.0 - 1.3 10e3/uL    Absolute Eosinophils 0.4 0.0 - 0.7 10e3/uL    Absolute Basophils 0.0 0.0 - 0.2 10e3/uL   Wet prep - Clinic Collect     Status: Abnormal    Specimen: Vagina; Swab   Result Value Ref Range    Trichomonas Absent Absent    Yeast Absent Absent    Clue Cells Present (A) Absent    WBCs/high power field 1+ (A) None   CBC with Platelets & Differential     Status: None    Narrative    The following orders were created for panel order CBC with Platelets & Differential.  Procedure                               Abnormality         Status                     ---------                                -----------         ------                     CBC with platelets and d...[[id number]]                      Final result                 Please view results for these tests on the individual orders.

## 2022-12-01 ENCOUNTER — TRANSFERRED RECORDS (OUTPATIENT)
Dept: HEALTH INFORMATION MANAGEMENT | Facility: CLINIC | Age: 27
End: 2022-12-01

## 2022-12-06 ENCOUNTER — TELEPHONE (OUTPATIENT)
Dept: FAMILY MEDICINE | Facility: CLINIC | Age: 27
End: 2022-12-06

## 2022-12-06 NOTE — TELEPHONE ENCOUNTER
"Patient called stating she was to follow up with Urology regarding her bladder infection and her  has not scheduled the appointment.  Patient reports no one is helping her and \"no one cares\".  Advised patient to reach out to her  for assistance and patient states she won't return the patients calls.  When asked how I can help patient, patient states she needs an appointment with urology.  Transferred call to Urology scheduling.  Genny Canales RN       "

## 2022-12-07 DIAGNOSIS — J30.1 SEASONAL ALLERGIC RHINITIS DUE TO POLLEN: ICD-10-CM

## 2022-12-08 RX ORDER — LORATADINE 10 MG/1
TABLET ORAL
Qty: 28 TABLET | Refills: 12 | Status: SHIPPED | OUTPATIENT
Start: 2022-12-08 | End: 2023-11-28

## 2022-12-12 ENCOUNTER — VIRTUAL VISIT (OUTPATIENT)
Dept: SURGERY | Facility: CLINIC | Age: 27
End: 2022-12-12
Payer: MEDICAID

## 2022-12-12 DIAGNOSIS — E66.813 CLASS 3 SEVERE OBESITY DUE TO EXCESS CALORIES WITH SERIOUS COMORBIDITY AND BODY MASS INDEX (BMI) OF 50.0 TO 59.9 IN ADULT (H): ICD-10-CM

## 2022-12-12 DIAGNOSIS — K21.9 GASTROESOPHAGEAL REFLUX DISEASE WITHOUT ESOPHAGITIS: Primary | ICD-10-CM

## 2022-12-12 DIAGNOSIS — Z71.3 NUTRITIONAL COUNSELING: ICD-10-CM

## 2022-12-12 DIAGNOSIS — E66.01 CLASS 3 SEVERE OBESITY DUE TO EXCESS CALORIES WITH SERIOUS COMORBIDITY AND BODY MASS INDEX (BMI) OF 50.0 TO 59.9 IN ADULT (H): ICD-10-CM

## 2022-12-12 DIAGNOSIS — E78.5 DYSLIPIDEMIA: ICD-10-CM

## 2022-12-12 PROCEDURE — 98968 PH1 ASSMT&MGMT NQHP 21-30: CPT | Mod: 93 | Performed by: DIETITIAN, REGISTERED

## 2022-12-12 NOTE — PROGRESS NOTES
Jane Lee is a 27 year old who is being evaluated via a billable telephone visit.      What phone number would you like to be contacted at? [phone]  How would you like to obtain your AVS? [facility]    Medical  Weight Loss Follow-Up Diet Evaluation  Assessment:  Jane is presenting today for a follow up weight management nutrition consultation. Pt has had an initial appointment with Dr. Marcano.   Weight loss medication: None.   Pt's weight is 282.4 lbs  Initial weight: 360 lbs  Weight change: down 77.6 lbs    No flowsheet data found.  BMI: There is no height or weight on file to calculate BMI.  Ideal body weight: 47.8 kg (105 lb 4.8 oz)  Adjusted ideal body weight: 79.9 kg (176 lb 2.2 oz)    Estimated RMR ([place]-St Jeor equation):   1965 kcals x 1.3 (light active) = 2555 kcals (for weight maintenance)     Recommended Protein Intake: 60-80 grams of protein/day  Patient Active Problem List:  Patient Active Problem List   Diagnosis     Morbid obesity (H)     Autism spectrum disorder     Moderate episode of recurrent major depressive disorder (H)     Hypothyroidism, unspecified type     YARA (generalized anxiety disorder)     Mild intellectual disability     Attention deficit hyperactivity disorder (ADHD), unspecified ADHD type     Oppositional defiant disorder     History of seizures     Lactose intolerance     Migraine without aura and without status migrainosus, not intractable     Non-seasonal allergic rhinitis due to pollen     Gastroesophageal reflux disease without esophagitis     Constipation, unspecified constipation type     Lives in group home     Polycystic ovary syndrome     Peripheral vision loss, bilateral     Impaired physical mobility     Hirsutism     Eczema     Borderline personality disorder (H)     Seizures (H)     Diabetes: No     Progress on goals from last visit: Patient has switched to a different group home, since last visit, therefore looking for guidelines from this writer pertaining  to how she should be following the diet.  Patient reports that she feels like she is unable to have things that were allowed at the other home and wants to have the staff aware of what is OK and what she should be limiting to the serving sizes.  Patient's caregiver present taking notes during visit.      Beverages: water, Crystal Light, Decaf Coffee with SF Creamer, Milk 1 glass/day, Egg Nog on occasion, Sparkling Water, occasional Juice  Exercise: walking     Nutrition Diagnosis:    Overweight/Obesity (NC 3.3) related to overeating and poor lifestyle habits as evidenced by patient's subjective statements and BMI of 53.4 kg/m2.     Intervention:  1. Food and/or nutrient delivery: balanced meals, adequate protein  2. Nutrition education: non surgical weight loss packet, servings sizes, calorie containing beverages, protein intake  3. Nutrition counseling: provided support and encouragement     Monitoring/Evaluation:    Goals:  1. Focus on protein first at each meal, aiming for 20-30 grams of protein/meal, 3 meals/day.   2. If still hungry after a meal, take seconds of veggies.   3. Limit portion sizes of sweets to 1 serving.   4. Choose beverages that are 10 calories or less/serving.  Limit milk to 8 oz/day.       Phone call duration: 30 minutes      Originating Location (pt. Location): Home        Distant Location (provider location):  Off-site    Mode of Communication:  Video Conference via [employer]    Physician has received verbal consent for a Video Visit from the patient? Yes      Shima Saldaña RD

## 2022-12-12 NOTE — LETTER
12/12/2022         RE: Jane Lee  [address] [facility] [address]        Dear Colleague,    Thank you for referring your patient, Jane Lee, to the [facility] [place]. Please see a copy of my visit note below.    Jane Lee is a 27 year old who is being evaluated via a billable telephone visit.      What phone number would you like to be contacted at? [phone]  How would you like to obtain your AVS? [facility]    Medical  Weight Loss Follow-Up Diet Evaluation  Assessment:  Jane is presenting today for a follow up weight management nutrition consultation. Pt has had an initial appointment with Dr. Marcano.   Weight loss medication: None.   Pt's weight is 282.4 lbs  Initial weight: 360 lbs  Weight change: down 77.6 lbs    No flowsheet data found.  BMI: There is no height or weight on file to calculate BMI.  Ideal body weight: 47.8 kg (105 lb 4.8 oz)  Adjusted ideal body weight: 79.9 kg (176 lb 2.2 oz)    Estimated RMR ([place]-St Jeor equation):   1965 kcals x 1.3 (light active) = 2555 kcals (for weight maintenance)     Recommended Protein Intake: 60-80 grams of protein/day  Patient Active Problem List:  Patient Active Problem List   Diagnosis     Morbid obesity (H)     Autism spectrum disorder     Moderate episode of recurrent major depressive disorder (H)     Hypothyroidism, unspecified type     YARA (generalized anxiety disorder)     Mild intellectual disability     Attention deficit hyperactivity disorder (ADHD), unspecified ADHD type     Oppositional defiant disorder     History of seizures     Lactose intolerance     Migraine without aura and without status migrainosus, not intractable     Non-seasonal allergic rhinitis due to pollen     Gastroesophageal reflux disease without esophagitis     Constipation, unspecified constipation type     Lives in group home     Polycystic ovary syndrome     Peripheral vision loss, bilateral     Impaired  physical mobility     Hirsutism     Eczema     Borderline personality disorder (H)     Seizures (H)     Diabetes: No     Progress on goals from last visit: Patient has switched to a different group home, since last visit, therefore looking for guidelines from this writer pertaining to how she should be following the diet.  Patient reports that she feels like she is unable to have things that were allowed at the other home and wants to have the staff aware of what is OK and what she should be limiting to the serving sizes.  Patient's caregiver present taking notes during visit.      Beverages: water, Crystal Light, Decaf Coffee with SF Creamer, Milk 1 glass/day, Egg Nog on occasion, Sparkling Water, occasional Juice  Exercise: walking     Nutrition Diagnosis:    Overweight/Obesity (NC 3.3) related to overeating and poor lifestyle habits as evidenced by patient's subjective statements and BMI of 53.4 kg/m2.     Intervention:  1. Food and/or nutrient delivery: balanced meals, adequate protein  2. Nutrition education: non surgical weight loss packet, servings sizes, calorie containing beverages, protein intake  3. Nutrition counseling: provided support and encouragement     Monitoring/Evaluation:    Goals:  1. Focus on protein first at each meal, aiming for 20-30 grams of protein/meal, 3 meals/day.   2. If still hungry after a meal, take seconds of veggies.   3. Limit portion sizes of sweets to 1 serving.   4. Choose beverages that are 10 calories or less/serving.  Limit milk to 8 oz/day.       Phone call duration: 30 minutes      Originating Location (pt. Location): Home        Distant Location (provider location):  Off-site    Mode of Communication:  Video Conference via [facility]    Physician has received verbal consent for a Video Visit from the patient? Yes      Shima Saldaña, RD            Again, thank you for allowing me to participate in the care of your patient.        Sincerely,        Shima LAINEZ  Lausted, RD

## 2022-12-29 ENCOUNTER — TELEPHONE (OUTPATIENT)
Dept: FAMILY MEDICINE | Facility: CLINIC | Age: 27
End: 2022-12-29
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
## 2022-12-30 ENCOUNTER — TELEPHONE (OUTPATIENT)
Dept: FAMILY MEDICINE | Facility: CLINIC | Age: 27
End: 2022-12-30

## 2022-12-30 NOTE — TELEPHONE ENCOUNTER
Mother returned call, she said pt was seen in ER last night and is feeling slightly better today. Depakote dose was decreased down to 1000 mg by   ER doctor in [place], [place].Pt is with her mother traveling to see family for [place],. Pt says she feels slightly better today, is less shaky and irritable, pt sounds calm on the phone. Mother and pt feel safe today. Mother says pt had been on depakote in the past and pt had similar issues with it then. Medication had been changed from trileptal to depakote in beginning of October, mother says pt was on the trileptal  for seizures. Pt has neurology appt scheduled on 2/23/2022 at [facility]. Attempt to call group home staff to set up appointment,  Hannah is not present. Staff said they will pass on a message. This message postponed until Monday to call Hannah back at group [place] to get up appt with Dilip GIPSON at [facility]. Jyothi Juarez RN

## 2022-12-30 NOTE — TELEPHONE ENCOUNTER
Call placed to [phone] as there was no number listed in encounter to reach Chelsi Salguero.    Left voicemail message requesting call back clinic RN, number given.  Genny Canales RN

## 2023-01-02 NOTE — TELEPHONE ENCOUNTER
Called and left VM for them to call back to make appt.     There is a hold on 1/6 at the 3:20/3:40pm virtual slot for her   ok'ed per Thaddeus to be in person

## 2023-01-05 NOTE — TELEPHONE ENCOUNTER
I was able to get a hold of new group home and updated all phone numbers. Patient is still in hospital and once discharged they will  follow up with scheduling a hospital F/U.     1/5/2023: Hannah is patients darron at group home please call her first at [phone](work cell phone), [facility] [place]: [phone]. Hannah states that no information regarding Jane should be discussed with Jane's Mom (Tanesha)    Juanita Khan CMA

## 2023-01-10 ENCOUNTER — TELEPHONE (OUTPATIENT)
Dept: FAMILY MEDICINE | Facility: CLINIC | Age: 28
End: 2023-01-10

## 2023-01-10 NOTE — TELEPHONE ENCOUNTER
Received prior authorization request for [facility] for:    Olanzapine 5mg Dispersible Tablets, I don't see on patient's medication list.  Please advise.    DANIEL MONTGOMERY

## 2023-01-10 NOTE — TELEPHONE ENCOUNTER
This should be submitted to her psychiatrist.   She was admitted into the hospital last week, I don't know if this was started there or by her regular psychiatrist.  Katelyn Travis PA-C

## 2023-01-24 ENCOUNTER — ALLIED HEALTH/NURSE VISIT (OUTPATIENT)
Dept: FAMILY MEDICINE | Facility: CLINIC | Age: 28
End: 2023-01-24
Payer: MEDICAID

## 2023-01-24 DIAGNOSIS — Z30.42 ENCOUNTER FOR SURVEILLANCE OF INJECTABLE CONTRACEPTIVE: Primary | ICD-10-CM

## 2023-01-24 DIAGNOSIS — Z79.899 HIGH RISK MEDICATION USE: Primary | ICD-10-CM

## 2023-01-24 PROCEDURE — 96372 THER/PROPH/DIAG INJ SC/IM: CPT | Performed by: PHYSICIAN ASSISTANT

## 2023-01-24 RX ADMIN — MEDROXYPROGESTERONE ACETATE 150 MG: 150 INJECTION, SUSPENSION INTRAMUSCULAR at 09:46

## 2023-01-24 NOTE — PROGRESS NOTES
Clinic Administered Medication Documentation    Administrations This Visit     medroxyPROGESTERone (DEPO-PROVERA) injection 150 mg     Admin Date  01/24/2023 Action  Given Dose  150 mg Route  Intramuscular Site  Right Gluteus Kyle Administered By  Maxine Fuentes    Ordering Provider: Su Travis PA-C    Patient Supplied?: No                Depo Provera Documentation    URINE HCG: not indicated    Depo-Provera Standing Order inclusion/exclusion criteria reviewed.   Patient meets: inclusion criteria     BP: Data Unavailable  LAST PAP/EXAM:   Lab Results   Component Value Date    PAP NIL 03/13/2020       Prior to injection, verified patient identity using patient's name and date of birth. Medication was administered. Please see MAR and medication order for additional information.     Was entire vial of medication used? Yes  Vial/Syringe: Single dose vial  Expiration Date:  05/01/2024    Patient instructed to remain in clinic for 15 minutes.  NEXT INJECTION DUE: 4/11/23 - 4/25/23      Maxine Fuentes CMA

## 2023-01-31 ENCOUNTER — LAB (OUTPATIENT)
Dept: LAB | Facility: CLINIC | Age: 28
End: 2023-01-31
Payer: MEDICAID

## 2023-01-31 DIAGNOSIS — Z79.899 HIGH RISK MEDICATION USE: ICD-10-CM

## 2023-01-31 LAB — AMMONIA PLAS-SCNC: 56 UMOL/L (ref 11–51)

## 2023-01-31 PROCEDURE — 82140 ASSAY OF AMMONIA: CPT

## 2023-01-31 PROCEDURE — 36415 COLL VENOUS BLD VENIPUNCTURE: CPT

## 2023-01-31 RX ORDER — TOLTERODINE TARTRATE 2 MG/1
TABLET, EXTENDED RELEASE ORAL
Qty: 28 TABLET | Refills: 12 | OUTPATIENT
Start: 2023-01-31

## 2023-01-31 NOTE — TELEPHONE ENCOUNTER
"Last Written Prescription Date: 11/18/22  Last Fill Quantity: 90, # refills: 1  Last office visit provider: 11/18/22        Requested Prescriptions   Pending Prescriptions Disp Refills     tolterodine (DETROL) 2 MG tablet [Pharmacy Med Name: Tolterodine Tartrate 2 MG Tablet] 28 tablet 12     Sig: TAKE 1 TABLET BY MOUTH DAILY (DO NOT START BEFORE 1/10/23)       Muscarinic Antagonists (Urinary Incontinence Agents) Passed - 1/27/2023  3:48 PM        Passed - Recent (12 mo) or future (30 days) visit within the authorizing provider's specialty     Patient has had an office visit with the authorizing provider or a provider within the authorizing providers department within the previous 12 mos or has a future within next 30 days. See \"Patient Info\" tab in inbasket, or \"Choose Columns\" in Meds & Orders section of the refill encounter.              Passed - Patient does not have a diagnosis of glaucoma on the problem list     If glaucoma diagnosis is new, refer refill to physician.          Passed - Medication is active on med list        Passed - Patient is 18 years of age or older                 Katy Arthur RN 01/31/23 2:37 PM  "

## 2023-02-01 DIAGNOSIS — N32.81 OVERACTIVE BLADDER: ICD-10-CM

## 2023-02-01 DIAGNOSIS — N39.41 URGE INCONTINENCE OF URINE: ICD-10-CM

## 2023-02-01 RX ORDER — TOLTERODINE 2 MG/1
2 CAPSULE, EXTENDED RELEASE ORAL DAILY
Qty: 90 CAPSULE | Refills: 1 | Status: SHIPPED | OUTPATIENT
Start: 2023-02-01 | End: 2023-07-18

## 2023-02-01 NOTE — TELEPHONE ENCOUNTER
"Last Written Prescription Date: 11/18/22  Last Fill Quantity: 90, # refills: 1  Last office visit provider: 11/18/22        Requested Prescriptions   Pending Prescriptions Disp Refills     tolterodine ER (DETROL LA) 2 MG 24 hr capsule 90 capsule 1     Sig: Take 1 capsule (2 mg) by mouth daily       Muscarinic Antagonists (Urinary Incontinence Agents) Passed - 2/1/2023  9:42 AM        Passed - Recent (12 mo) or future (30 days) visit within the authorizing provider's specialty     Patient has had an office visit with the authorizing provider or a provider within the authorizing providers department within the previous 12 mos or has a future within next 30 days. See \"Patient Info\" tab in inbasket, or \"Choose Columns\" in Meds & Orders section of the refill encounter.              Passed - Patient does not have a diagnosis of glaucoma on the problem list     If glaucoma diagnosis is new, refer refill to physician.          Passed - Medication is active on med list        Passed - Patient is 18 years of age or older                 Katy Arthur RN 02/01/23 12:57 PM  "

## 2023-02-08 ENCOUNTER — TELEPHONE (OUTPATIENT)
Dept: FAMILY MEDICINE | Facility: CLINIC | Age: 28
End: 2023-02-08
Payer: MEDICAID

## 2023-02-08 DIAGNOSIS — N39.41 URGE INCONTINENCE OF URINE: Primary | ICD-10-CM

## 2023-02-08 NOTE — TELEPHONE ENCOUNTER
I sent in an order. I had placed a referral for urology in November, I recommend she schedule visit with them for evaluation/treatment.  Katelyn Travis PA-C

## 2023-02-08 NOTE — TELEPHONE ENCOUNTER
Call placed to Flavia  Relayed R Thaddeus message  Number given to call and schedule urology appointment  Verbalized understanding  Sergio Quintanilla RN

## 2023-02-08 NOTE — TELEPHONE ENCOUNTER
Order/Referral Request    Who is requesting:  Patient's caregiver, Flavia    Orders being requested: Depends/Pullups    Reason service is needed/diagnosis: Incontinence    When are orders needed by: asap    Has this been discussed with Provider: Yes    Does patient have a preference on a Group/Provider/Facility? [facility]    Does patient have an appointment scheduled?: No    Where to send orders: Fax    Could we send this information to you in [facility] or would you prefer to receive a phone call?:   Patient would prefer a phone call   Okay to leave a detailed message?: Yes at Other phone number:  Flavia, Care Giver, [phone]

## 2023-03-13 ENCOUNTER — TELEPHONE (OUTPATIENT)
Dept: FAMILY MEDICINE | Facility: CLINIC | Age: 28
End: 2023-03-13
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
## 2023-04-11 ENCOUNTER — ALLIED HEALTH/NURSE VISIT (OUTPATIENT)
Dept: FAMILY MEDICINE | Facility: CLINIC | Age: 28
End: 2023-04-11
Payer: MEDICAID

## 2023-04-11 DIAGNOSIS — Z30.42 ENCOUNTER FOR SURVEILLANCE OF INJECTABLE CONTRACEPTIVE: Primary | ICD-10-CM

## 2023-04-11 PROCEDURE — 96127 BRIEF EMOTIONAL/BEHAV ASSMT: CPT

## 2023-04-11 PROCEDURE — 99207 PR NO CHARGE NURSE ONLY: CPT

## 2023-04-11 PROCEDURE — 96372 THER/PROPH/DIAG INJ SC/IM: CPT | Performed by: PHYSICIAN ASSISTANT

## 2023-04-11 PROCEDURE — 2894A PR MEDROXYPROGESTERONE INJ, 1MG: CPT

## 2023-04-11 RX ADMIN — MEDROXYPROGESTERONE ACETATE 150 MG: 150 INJECTION, SUSPENSION INTRAMUSCULAR at 10:20

## 2023-04-11 NOTE — PROGRESS NOTES
Clinic Administered Medication Documentation      Depo Provera Documentation    Depo-Provera Standing Order inclusion/exclusion criteria reviewed.     Is this the initial or subsequent dose of Depo Provera? Subsequent dose - patient is within the acceptable window of time (11-15 weeks) for subsequent injection. Pregnancy test not indicated.    Patient meets: inclusion criteria     Is there an active order (written within the past 365 days, with administrations remaining, not ) in the chart? Yes.     Prior to injection, verified patient identity using patient's name and date of birth. Medication was administered. Please see MAR and medication order for additional information.     Vial/Syringe: Single dose vial. Was entire vial of medication used? Yes    Patient instructed to remain in clinic for 15 minutes and report any adverse reaction to staff immediately.  NEXT INJECTION DUE: 23 - 23  GIVEN IN LEFT GLUTEUS EMMETT    Verified that the patient has refills remaining in their prescription.    Maxine Fuentes, CMA

## 2023-04-19 ENCOUNTER — TRANSFERRED RECORDS (OUTPATIENT)
Dept: HEALTH INFORMATION MANAGEMENT | Facility: CLINIC | Age: 28
End: 2023-04-19
Payer: MEDICAID

## 2023-04-20 ENCOUNTER — OFFICE VISIT (OUTPATIENT)
Dept: OPTOMETRY | Facility: CLINIC | Age: 28
End: 2023-04-20
Payer: MEDICAID

## 2023-04-20 DIAGNOSIS — H50.111 EXOTROPIA OF RIGHT EYE: ICD-10-CM

## 2023-04-20 DIAGNOSIS — H52.223 REGULAR ASTIGMATISM OF BOTH EYES: ICD-10-CM

## 2023-04-20 DIAGNOSIS — H53.483 VISUAL FIELD CONSTRICTION, BILATERAL: ICD-10-CM

## 2023-04-20 DIAGNOSIS — H52.13 MYOPIA OF BOTH EYES: ICD-10-CM

## 2023-04-20 DIAGNOSIS — Z01.01 ENCOUNTER FOR EXAMINATION OF EYES AND VISION WITH ABNORMAL FINDINGS: Primary | ICD-10-CM

## 2023-04-20 PROCEDURE — 92015 DETERMINE REFRACTIVE STATE: CPT | Performed by: OPTOMETRIST

## 2023-04-20 PROCEDURE — 92004 COMPRE OPH EXAM NEW PT 1/>: CPT | Performed by: OPTOMETRIST

## 2023-04-20 ASSESSMENT — REFRACTION_MANIFEST
OD_SPHERE: -1.75
OS_CYLINDER: +1.00
OD_CYLINDER: +1.00
OS_SPHERE: -1.50
OS_AXIS: 102
OD_AXIS: 090

## 2023-04-20 ASSESSMENT — CONF VISUAL FIELD
OD_SUPERIOR_NASAL_RESTRICTION: 3
OS_INFERIOR_NASAL_RESTRICTION: 3
OD_INFERIOR_NASAL_RESTRICTION: 3
OD_INFERIOR_TEMPORAL_RESTRICTION: 3
OS_SUPERIOR_NASAL_RESTRICTION: 3
METHOD: COUNTING FINGERS
OD_SUPERIOR_TEMPORAL_RESTRICTION: 3
OS_INFERIOR_TEMPORAL_RESTRICTION: 3
OS_SUPERIOR_TEMPORAL_RESTRICTION: 3

## 2023-04-20 ASSESSMENT — REFRACTION_WEARINGRX
OS_SPHERE: -1.50
OS_CYLINDER: +1.50
OD_SPHERE: -1.25
SPECS_TYPE: SVL
OS_AXIS: 091
OD_AXIS: 086
OD_CYLINDER: +1.25

## 2023-04-20 ASSESSMENT — VISUAL ACUITY
OS_CC: 20/40
OD_SC: 20/30
OD_SC: 20/50
OD_SC+: -2
OS_CC+: -1
OD_CC+: -1
OD_CC: 20/60
METHOD: SNELLEN - LINEAR
OS_SC: 20/40
OD_CC: 20/30
OS_CC: 20/20
OS_SC+: -2
CORRECTION_TYPE: GLASSES
OS_SC: 20/20

## 2023-04-20 ASSESSMENT — SLIT LAMP EXAM - LIDS
COMMENTS: NORMAL
COMMENTS: NORMAL

## 2023-04-20 ASSESSMENT — TONOMETRY
OS_IOP_MMHG: 16
IOP_METHOD: APPLANATION
OD_IOP_MMHG: 16

## 2023-04-20 ASSESSMENT — CUP TO DISC RATIO
OS_RATIO: 0.25
OD_RATIO: 0.3

## 2023-04-20 ASSESSMENT — EXTERNAL EXAM - LEFT EYE: OS_EXAM: NORMAL

## 2023-04-20 ASSESSMENT — EXTERNAL EXAM - RIGHT EYE: OD_EXAM: NORMAL

## 2023-04-20 NOTE — PATIENT INSTRUCTIONS
"Constricted visual field each eye. Patient reports this is a result of brain surgery she had at the age of 4. Monitor.     I recommend using artificial tears for your dry eye. There are over the counter drops that work well and may be used up to 4x daily (Systane , Refresh, Thera Tears)- avoid \"get the red out\" drops. If you need more than 4 drops daily, use a preservative free product which come in individual vials and may be used for 24 hours until finished and discarded.    Allergies can cause itching and tearing of the eyes.  I recommend ketotifen drops (Alaway or Zaditor) to be used 2x daily in each eye. These drops are available over the counter.       Updated glasses prescription provided today.   Allow 2 weeks to adapt to the new glasses.   Wear glasses full-time.     Return in 1 year for a comprehensive eye exam, or sooner if needed.      The effects of the dilating drops last for 4- 6 hours.  You will be more sensitive to light and vision will be blurry up close.  Mydriatic sunglasses were given if needed.     Jasvir Webb, OD  [facility]  [address] [place] [address]. FELICE Tejeda  [address]    [phone]   "

## 2023-04-20 NOTE — LETTER
4/20/2023         RE: Jane Lee  [address] [place] [address]        Dear Colleague,    Thank you for referring your patient, Jane Lee, to the [facility]. Please see a copy of my visit note below.    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  Glasses - SVL - wears most of the time        Distance Vision Acuity: Noticed gradual change in both eyes - feels glasses aren't working as well     Near Vision Acuity: Not satisfied     Eye Comfort: burning, red, itchy - allergies   Do you use eye drops? : No - would like some recommendations to relieve her allergy symptoms     Occupation or Hobbies: Likes taking pictures - red camera is elf camera and silver camera is for [facility]        Medical, surgical and family histories reviewed and updated 4/20/2023.       OBJECTIVE: See Ophthalmology exam    ASSESSMENT:    ICD-10-CM    1. Encounter for examination of eyes and vision with abnormal findings  Z01.01 EYE EXAM (SIMPLE-NONBILLABLE)      2. Visual field constriction, bilateral  H53.483 EYE EXAM (SIMPLE-NONBILLABLE)      3. Exotropia of right eye  H50.111 EYE EXAM (SIMPLE-NONBILLABLE)      4. Myopia of both eyes  H52.13 EYE EXAM (SIMPLE-NONBILLABLE)     REFRACTION      5. Regular astigmatism of both eyes  H52.223 EYE EXAM (SIMPLE-NONBILLABLE)     REFRACTION          PLAN:     Patient Instructions   Constricted visual field each eye. Patient reports this is a result of brain surgery she had at the age of 4. Monitor.     I recommend using artificial tears for your dry eye. There are over the counter drops that work well  "and may be used up to 4x daily (Systane , Refresh, Thera Tears)- avoid \"get the red out\" drops. If you need more than 4 drops daily, use a preservative free product which come in individual vials and may be used for 24 hours until finished and discarded.    Allergies can cause itching and tearing of the eyes.  I recommend ketotifen drops (Alaway or Zaditor) to be used 2x daily in each eye. These drops are available over the counter.       Updated glasses prescription provided today.   Allow 2 weeks to adapt to the new glasses.   Wear glasses full-time.     Return in 1 year for a comprehensive eye exam, or sooner if needed.      The effects of the dilating drops last for 4- 6 hours.  You will be more sensitive to light and vision will be blurry up close.  Mydriatic sunglasses were given if needed.     Jasvir Webb, ARTEM  [address] [place]  [address]    [phone]            Again, thank you for allowing me to participate in the care of your patient.        Sincerely,        Jasvir Webb, OD    "

## 2023-04-20 NOTE — PROGRESS NOTES
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  Glasses - SVL - wears most of the time        Distance Vision Acuity: Noticed gradual change in both eyes - feels glasses aren't working as well     Near Vision Acuity: Not satisfied     Eye Comfort: burning, red, itchy - allergies   Do you use eye drops? : No - would like some recommendations to relieve her allergy symptoms     Occupation or Hobbies: Likes taking pictures - red camera is elf camera and silver camera is for [employer]        Medical, surgical and family histories reviewed and updated 4/20/2023.       OBJECTIVE: See Ophthalmology exam    ASSESSMENT:    ICD-10-CM    1. Encounter for examination of eyes and vision with abnormal findings  Z01.01 EYE EXAM (SIMPLE-NONBILLABLE)      2. Visual field constriction, bilateral  H53.483 EYE EXAM (SIMPLE-NONBILLABLE)      3. Exotropia of right eye  H50.111 EYE EXAM (SIMPLE-NONBILLABLE)      4. Myopia of both eyes  H52.13 EYE EXAM (SIMPLE-NONBILLABLE)     REFRACTION      5. Regular astigmatism of both eyes  H52.223 EYE EXAM (SIMPLE-NONBILLABLE)     REFRACTION          PLAN:     Patient Instructions   Constricted visual field each eye. Patient reports this is a result of brain surgery she had at the age of 4. Monitor.     I recommend using artificial tears for your dry eye. There are over the counter drops that work well and may be used up to 4x daily (Systane , Refresh, Thera Tears)- avoid \"get the red out\" drops. If you need more than 4 drops daily, use a preservative free product which come in individual vials and may be used for 24 hours until finished and " discarded.    Allergies can cause itching and tearing of the eyes.  I recommend ketotifen drops (Alaway or Zaditor) to be used 2x daily in each eye. These drops are available over the counter.       Updated glasses prescription provided today.   Allow 2 weeks to adapt to the new glasses.   Wear glasses full-time.     Return in 1 year for a comprehensive eye exam, or sooner if needed.      The effects of the dilating drops last for 4- 6 hours.  You will be more sensitive to light and vision will be blurry up close.  Mydriatic sunglasses were given if needed.     Jasvir Webb, OD  [facility]  [address] [place] [address]. [place]  [address] [phone]

## 2023-04-24 DIAGNOSIS — E03.9 HYPOTHYROIDISM, UNSPECIFIED TYPE: ICD-10-CM

## 2023-04-24 RX ORDER — LEVOTHYROXINE SODIUM 75 UG/1
TABLET ORAL
Qty: 28 TABLET | Refills: 12 | Status: SHIPPED | OUTPATIENT
Start: 2023-04-24 | End: 2024-04-08

## 2023-05-09 ENCOUNTER — APPOINTMENT (OUTPATIENT)
Dept: OPTOMETRY | Facility: CLINIC | Age: 28
End: 2023-05-09
Payer: MEDICAID

## 2023-05-09 ENCOUNTER — TELEPHONE (OUTPATIENT)
Dept: FAMILY MEDICINE | Facility: CLINIC | Age: 28
End: 2023-05-09

## 2023-05-09 DIAGNOSIS — H10.13 ALLERGIC CONJUNCTIVITIS, BILATERAL: Primary | ICD-10-CM

## 2023-05-09 PROCEDURE — 92340 FIT SPECTACLES MONOFOCAL: CPT | Performed by: OPTOMETRIST

## 2023-05-09 RX ORDER — OLOPATADINE HYDROCHLORIDE 1 MG/ML
1 SOLUTION/ DROPS OPHTHALMIC 2 TIMES DAILY
Qty: 5 ML | Refills: 0 | Status: SHIPPED | OUTPATIENT
Start: 2023-05-09 | End: 2024-08-26

## 2023-05-09 NOTE — TELEPHONE ENCOUNTER
New Medication Request    What medication are you requesting?: Flavia Pt's caregiver calling and asking for an Rx for eye drops for allergies.  Please call Flavia and advise.      Reason for medication request: Allergies    Have you taken this medication before?: No    Controlled Substance Agreement on file:   CSA -- Patient Level:    CSA: None found at the patient level.       Patient offered an appointment? No    Preferred Pharmacy:   [facility] [id number] [address]  Phone: [phone] Fax: [phone]    Could we send this information to you in [employer] or would you prefer to receive a phone call?:   No preference   Okay to leave a detailed message?: Yes at Cell number on file:    Telephone Information:   Mobile [phone]        No further follow up instructions as of 9/11/2019 at 10:47am GRISEL

## 2023-05-09 NOTE — TELEPHONE ENCOUNTER
Call placed to Patient  Patient is having watering, burning and itchy eyes.  Has been going on for about the past week  Patient states that she has been developing seasonal sensitivities for the last few years  Patient has not tried any OTC medication as Care giver would like medications to be prescription.  Relayed to Care giver that Patient is due for annual physical.  Care giver will relay to manager, to schedule appointment.  Sergio Quintanilla RN

## 2023-05-09 NOTE — TELEPHONE ENCOUNTER
Generally this is OTC. They have not been prescribed before. I need to know more details on her symptoms - is it itchy, dry, watery eyes ?  She also needs to schedule preventive/pap  Katelyn Travis PA-C

## 2023-07-06 ENCOUNTER — TELEPHONE (OUTPATIENT)
Dept: FAMILY MEDICINE | Facility: CLINIC | Age: 28
End: 2023-07-06

## 2023-07-06 ENCOUNTER — ALLIED HEALTH/NURSE VISIT (OUTPATIENT)
Dept: FAMILY MEDICINE | Facility: CLINIC | Age: 28
End: 2023-07-06
Payer: MEDICAID

## 2023-07-06 DIAGNOSIS — Z30.42 ENCOUNTER FOR SURVEILLANCE OF INJECTABLE CONTRACEPTIVE: Primary | ICD-10-CM

## 2023-07-06 DIAGNOSIS — F70 MILD INTELLECTUAL DISABILITY: Primary | ICD-10-CM

## 2023-07-06 PROCEDURE — 96372 THER/PROPH/DIAG INJ SC/IM: CPT | Performed by: FAMILY MEDICINE

## 2023-07-06 PROCEDURE — 96372 THER/PROPH/DIAG INJ SC/IM: CPT

## 2023-07-06 PROCEDURE — 99207 PR NO CHARGE NURSE ONLY: CPT

## 2023-07-06 RX ORDER — MEDROXYPROGESTERONE ACETATE 150 MG/ML
150 INJECTION, SUSPENSION INTRAMUSCULAR
Status: COMPLETED | OUTPATIENT
Start: 2023-07-06 | End: 2024-04-04

## 2023-07-06 RX ADMIN — MEDROXYPROGESTERONE ACETATE 150 MG: 150 INJECTION, SUSPENSION INTRAMUSCULAR at 11:45

## 2023-07-06 NOTE — TELEPHONE ENCOUNTER
Hannah patient's guardian is returning call to Clinic RN.  I am not sure what else needs to be addressed.  Please call Hannah at [phone] after 2:30pm today. She is in a meeting.    Thank you    Vale TIMMONS RN

## 2023-07-06 NOTE — TELEPHONE ENCOUNTER
Called Hannah.   She also stated that patient does not report accurately.   Let her know she is being seen tomorrow.   Mitali Peterson RN on 7/6/2023 at 4:08 PM
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
## 2023-07-06 NOTE — PROGRESS NOTES
Patient in clinic today for Depo injection, at that time she has mentioned that since moving to new group home she has been having a lot of urinary issues to the point of not being able to make it to the bathroom in time, also foul smell to urine and white discharge. I stated that she really needs to have an appointment to address concerns, when this was mentioned to staff that brought her to the appointment kind of blew it off and stated that they are aware of this but declined to make an appointment. When she was living at her old group home she felt like they cared more for her and was not having these concerns. Patient has lost over 20 pounds since her last weight was recorded, she looks great.      Clinic Administered Medication Documentation      Depo Provera Documentation    Depo-Provera Standing Order inclusion/exclusion criteria reviewed.     Is this the initial or subsequent dose of Depo Provera? Subsequent dose - patient is within the acceptable window of time (11-15 weeks) for subsequent injection. Pregnancy test not indicated.    Patient meets: inclusion criteria     Is there an active order (written within the past 365 days, with administrations remaining, not ) in the chart? Yes.     Prior to injection, verified patient identity using patient's name and date of birth. Medication was administered. Please see MAR and medication order for additional information.     Vial/Syringe: Single dose vial. Was entire vial of medication used? Yes    Patient instructed to remain in clinic for 15 minutes and report any adverse reaction to staff immediately.  NEXT INJECTION DUE: 23 - 10/19/23    Verified that the patient has refills remaining in their prescription.  Juanita Khan [facility]
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
## 2023-07-07 ENCOUNTER — OFFICE VISIT (OUTPATIENT)
Dept: FAMILY MEDICINE | Facility: CLINIC | Age: 28
End: 2023-07-07
Payer: MEDICAID

## 2023-07-07 VITALS
HEART RATE: 65 BPM | RESPIRATION RATE: 16 BRPM | DIASTOLIC BLOOD PRESSURE: 78 MMHG | BODY MASS INDEX: 50.22 KG/M2 | SYSTOLIC BLOOD PRESSURE: 120 MMHG | HEIGHT: 61 IN | TEMPERATURE: 99.2 F | WEIGHT: 266 LBS | OXYGEN SATURATION: 98 %

## 2023-07-07 DIAGNOSIS — B37.31 YEAST INFECTION OF THE VAGINA: Primary | ICD-10-CM

## 2023-07-07 DIAGNOSIS — R30.0 DYSURIA: ICD-10-CM

## 2023-07-07 DIAGNOSIS — N89.8 VAGINAL DISCHARGE: ICD-10-CM

## 2023-07-07 LAB
ALBUMIN UR-MCNC: NEGATIVE MG/DL
APPEARANCE UR: CLEAR
BACTERIA #/AREA URNS HPF: ABNORMAL /HPF
BILIRUB UR QL STRIP: NEGATIVE
CLUE CELLS: ABNORMAL
COLOR UR AUTO: YELLOW
GLUCOSE UR STRIP-MCNC: NEGATIVE MG/DL
HGB UR QL STRIP: NEGATIVE
KETONES UR STRIP-MCNC: NEGATIVE MG/DL
LEUKOCYTE ESTERASE UR QL STRIP: ABNORMAL
NITRATE UR QL: NEGATIVE
PH UR STRIP: 6.5 [PH] (ref 5–7)
RBC #/AREA URNS AUTO: ABNORMAL /HPF
RENAL EPI CELLS #/AREA URNS HPF: ABNORMAL /HPF
SP GR UR STRIP: 1.01 (ref 1–1.03)
SQUAMOUS #/AREA URNS AUTO: ABNORMAL /LPF
TRICHOMONAS, WET PREP: ABNORMAL
UROBILINOGEN UR STRIP-ACNC: 0.2 E.U./DL
WBC #/AREA URNS AUTO: ABNORMAL /HPF
WBC'S/HIGH POWER FIELD, WET PREP: ABNORMAL
YEAST, WET PREP: PRESENT

## 2023-07-07 PROCEDURE — 87210 SMEAR WET MOUNT SALINE/INK: CPT | Performed by: NURSE PRACTITIONER

## 2023-07-07 PROCEDURE — 81001 URINALYSIS AUTO W/SCOPE: CPT | Performed by: NURSE PRACTITIONER

## 2023-07-07 PROCEDURE — 99213 OFFICE O/P EST LOW 20 MIN: CPT | Performed by: NURSE PRACTITIONER

## 2023-07-07 RX ORDER — FLUCONAZOLE 150 MG/1
150 TABLET ORAL ONCE
Qty: 1 TABLET | Refills: 0 | Status: SHIPPED | OUTPATIENT
Start: 2023-07-07 | End: 2023-07-07

## 2023-07-07 ASSESSMENT — PAIN SCALES - GENERAL: PAINLEVEL: NO PAIN (0)

## 2023-07-07 ASSESSMENT — ENCOUNTER SYMPTOMS: FLANK PAIN: 0
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
## 2023-07-07 NOTE — Clinical Note
Can you delete the incomplete note? I can't do it and can't close the note until you delete it. Thanks Abigail

## 2023-07-18 DIAGNOSIS — N32.81 OVERACTIVE BLADDER: ICD-10-CM

## 2023-07-18 DIAGNOSIS — N39.41 URGE INCONTINENCE OF URINE: ICD-10-CM

## 2023-07-18 RX ORDER — PROPRANOLOL HYDROCHLORIDE 20 MG/1
TABLET ORAL
Qty: 84 TABLET | Refills: 12 | OUTPATIENT
Start: 2023-07-18

## 2023-07-18 RX ORDER — TOLTERODINE 2 MG/1
CAPSULE, EXTENDED RELEASE ORAL
Qty: 28 CAPSULE | Refills: 12 | Status: SHIPPED | OUTPATIENT
Start: 2023-07-18 | End: 2024-04-08

## 2023-07-18 NOTE — TELEPHONE ENCOUNTER
"Routing refill request to provider for review/approval because:  Propranolol is different than med list.  Please see message from pharmacy. Patient just filled on 6/30/2023. They are asking for proactive refills.       Requested Prescriptions   Pending Prescriptions Disp Refills    propranolol (INDERAL) 20 MG tablet [Pharmacy Med Name: Propranolol HCl 20 MG Tablet] 84 tablet 12     Sig: TAKE 1 TABLET BY MOUTH 3 TIMES DAILY       Beta-Blockers Protocol Passed - 7/18/2023 10:29 AM        Passed - Blood pressure under 140/90 in past 12 months     BP Readings from Last 3 Encounters:   07/07/23 120/78   11/18/22 128/78   10/05/22 124/61                 Passed - Patient is age 6 or older        Passed - Recent (12 mo) or future (30 days) visit within the authorizing provider's specialty     Patient has had an office visit with the authorizing provider or a provider within the authorizing providers department within the previous 12 mos or has a future within next 30 days. See \"Patient Info\" tab in inCodeBabysket, or \"Choose Columns\" in Meds & Orders section of the refill encounter.              Passed - Medication is active on med list          tolterodine ER (DETROL LA) 2 MG 24 hr capsule [Pharmacy Med Name: Tolterodine Tartrate ER 2 MG Capsule extended release 24 hr] 28 capsule 12     Sig: TAKE 1 CAPSULE BY MOUTH DAILY       Muscarinic Antagonists (Urinary Incontinence Agents) Passed - 7/18/2023 10:29 AM        Passed - Recent (12 mo) or future (30 days) visit within the authorizing provider's specialty     Patient has had an office visit with the authorizing provider or a provider within the authorizing providers department within the previous 12 mos or has a future within next 30 days. See \"Patient Info\" tab in inCodeBabysket, or \"Choose Columns\" in Meds & Orders section of the refill encounter.              Passed - Patient does not have a diagnosis of glaucoma on the problem list     If glaucoma diagnosis is new, refer refill to " physician.          Passed - Medication is active on med list        Passed - Patient is 18 years of age or older                 Mitali [place] RN 07/18/23 2:10 PM

## 2023-07-19 NOTE — TELEPHONE ENCOUNTER
Call from [place] pharmacy transferred to author  Looking for update on Propranolol refill request    Relayed Milton's message below  Pharmacy will reach out to the facility to determine who to send the refill request to  No further questions/concerns    Jose Miguel Hurtado RN

## 2023-07-30 ENCOUNTER — HEALTH MAINTENANCE LETTER (OUTPATIENT)
Age: 28
End: 2023-07-30

## 2023-08-17 DIAGNOSIS — K21.9 GASTROESOPHAGEAL REFLUX DISEASE WITHOUT ESOPHAGITIS: ICD-10-CM

## 2023-08-17 RX ORDER — FAMOTIDINE 20 MG/1
20 TABLET, FILM COATED ORAL AT BEDTIME
Qty: 28 TABLET | Refills: 12 | Status: SHIPPED | OUTPATIENT
Start: 2023-08-17 | End: 2024-08-01

## 2023-08-17 NOTE — TELEPHONE ENCOUNTER
"Routing refill request to provider for review/approval because:  PCP to determine length of use        Requested Prescriptions   Pending Prescriptions Disp Refills    famotidine (PEPCID) 20 MG tablet 28 tablet 12     Sig: Take 1 tablet (20 mg) by mouth At Bedtime       H2 Blockers Protocol Passed - 8/17/2023  1:11 PM        Passed - Patient is age 12 or older        Passed - Recent (12 mo) or future (30 days) visit within the authorizing provider's specialty     Patient has had an office visit with the authorizing provider or a provider within the authorizing providers department within the previous 12 mos or has a future within next 30 days. See \"Patient Info\" tab in inbasket, or \"Choose Columns\" in Meds & Orders section of the refill encounter.              Passed - Medication is active on med list                 Mitali [place] RN 08/17/23 2:23 PM      "

## 2023-08-22 DIAGNOSIS — K21.9 GASTROESOPHAGEAL REFLUX DISEASE WITHOUT ESOPHAGITIS: ICD-10-CM

## 2023-08-22 RX ORDER — FAMOTIDINE 20 MG/1
20 TABLET, FILM COATED ORAL AT BEDTIME
Qty: 28 TABLET | Refills: 12 | OUTPATIENT
Start: 2023-08-22

## 2023-08-23 RX ORDER — FAMOTIDINE 20 MG/1
20 TABLET, FILM COATED ORAL AT BEDTIME
Qty: 28 TABLET | Refills: 12 | OUTPATIENT
Start: 2023-08-23

## 2023-08-25 ENCOUNTER — OFFICE VISIT (OUTPATIENT)
Dept: FAMILY MEDICINE | Facility: CLINIC | Age: 28
End: 2023-08-25
Payer: MEDICAID

## 2023-08-25 VITALS
HEIGHT: 61 IN | TEMPERATURE: 98.6 F | OXYGEN SATURATION: 99 % | RESPIRATION RATE: 22 BRPM | SYSTOLIC BLOOD PRESSURE: 110 MMHG | WEIGHT: 257.7 LBS | HEART RATE: 63 BPM | BODY MASS INDEX: 48.65 KG/M2 | DIASTOLIC BLOOD PRESSURE: 70 MMHG

## 2023-08-25 DIAGNOSIS — Z31.69 ENCOUNTER FOR PRECONCEPTION CONSULTATION: ICD-10-CM

## 2023-08-25 DIAGNOSIS — Z00.00 ENCOUNTER FOR ROUTINE ADULT HEALTH EXAMINATION WITHOUT ABNORMAL FINDINGS: Primary | ICD-10-CM

## 2023-08-25 DIAGNOSIS — F33.1 MODERATE EPISODE OF RECURRENT MAJOR DEPRESSIVE DISORDER (H): Chronic | ICD-10-CM

## 2023-08-25 DIAGNOSIS — F60.3 BORDERLINE PERSONALITY DISORDER (H): ICD-10-CM

## 2023-08-25 DIAGNOSIS — E66.01 MORBID OBESITY (H): Chronic | ICD-10-CM

## 2023-08-25 DIAGNOSIS — F70 MILD INTELLECTUAL DISABILITY: ICD-10-CM

## 2023-08-25 DIAGNOSIS — R09.81 NASAL CONGESTION: ICD-10-CM

## 2023-08-25 DIAGNOSIS — E03.9 HYPOTHYROIDISM, UNSPECIFIED TYPE: ICD-10-CM

## 2023-08-25 DIAGNOSIS — G43.009 MIGRAINE WITHOUT AURA AND WITHOUT STATUS MIGRAINOSUS, NOT INTRACTABLE: ICD-10-CM

## 2023-08-25 DIAGNOSIS — R56.9 SEIZURES (H): ICD-10-CM

## 2023-08-25 DIAGNOSIS — Z78.9 LIVES IN GROUP HOME: Chronic | ICD-10-CM

## 2023-08-25 DIAGNOSIS — Z87.898 HISTORY OF SEIZURES: Chronic | ICD-10-CM

## 2023-08-25 DIAGNOSIS — N32.81 OAB (OVERACTIVE BLADDER): ICD-10-CM

## 2023-08-25 PROCEDURE — 99395 PREV VISIT EST AGE 18-39: CPT | Performed by: PHYSICIAN ASSISTANT

## 2023-08-25 PROCEDURE — 99214 OFFICE O/P EST MOD 30 MIN: CPT | Mod: 25 | Performed by: PHYSICIAN ASSISTANT

## 2023-08-25 RX ORDER — SUMATRIPTAN 50 MG/1
TABLET, FILM COATED ORAL
Qty: 9 TABLET | Refills: 0 | Status: SHIPPED | OUTPATIENT
Start: 2023-08-25

## 2023-08-25 ASSESSMENT — ENCOUNTER SYMPTOMS
WEAKNESS: 0
MYALGIAS: 0
HEMATURIA: 0
SORE THROAT: 0
CONSTIPATION: 0
NERVOUS/ANXIOUS: 0
COUGH: 0
HEMATOCHEZIA: 0
EYE PAIN: 0
SHORTNESS OF BREATH: 0
DIZZINESS: 0
BREAST MASS: 0
HEADACHES: 1
FEVER: 0
JOINT SWELLING: 0
DIARRHEA: 0
FREQUENCY: 0
PARESTHESIAS: 0
PALPITATIONS: 0
ABDOMINAL PAIN: 0
DYSURIA: 0
NAUSEA: 0
CHILLS: 0
HEARTBURN: 0
ARTHRALGIAS: 0

## 2023-08-25 ASSESSMENT — PATIENT HEALTH QUESTIONNAIRE - PHQ9
SUM OF ALL RESPONSES TO PHQ QUESTIONS 1-9: 0
SUM OF ALL RESPONSES TO PHQ QUESTIONS 1-9: 0
10. IF YOU CHECKED OFF ANY PROBLEMS, HOW DIFFICULT HAVE THESE PROBLEMS MADE IT FOR YOU TO DO YOUR WORK, TAKE CARE OF THINGS AT HOME, OR GET ALONG WITH OTHER PEOPLE: NOT DIFFICULT AT ALL

## 2023-08-25 ASSESSMENT — PAIN SCALES - GENERAL: PAINLEVEL: NO PAIN (0)

## 2023-08-25 NOTE — PROGRESS NOTES
SUBJECTIVE:   CC: Jane is an 28 year old who presents for preventive health visit.       8/25/2023    10:46 AM   Additional Questions   Roomed by Siena MOSER       Healthy Habits:     Getting at least 3 servings of Calcium per day:  Yes    Bi-annual eye exam:  Yes    Dental care twice a year:  Yes    Sleep apnea or symptoms of sleep apnea:  None    Diet:  Low fat/cholesterol    Frequency of exercise:  6-7 days/week    Duration of exercise:  Greater than 60 minutes    Taking medications regularly:  Yes    Medication side effects:  Other    Additional concerns today:  Yes (have been really shaky and dizzy at times, and had a headache last night and threw up)    Vaginal symptoms: stopped using miconazole/aquaphor  Still has issues due to incontinence      Today's PHQ-9 Score:       8/25/2023    10:42 AM   PHQ-9 SCORE   PHQ-9 Total Score MyChart 0   PHQ-9 Total Score 0       Social History     Tobacco Use    Smoking status: Never    Smokeless tobacco: Never   Substance Use Topics    Alcohol use: Never             8/25/2023    10:44 AM   Alcohol Use   Prescreen: >3 drinks/day or >7 drinks/week? No     Reviewed orders with patient.  Reviewed health maintenance and updated orders accordingly - Yes  Lab work is in process  Labs reviewed in EPIC  BP Readings from Last 3 Encounters:   08/25/23 110/70   07/07/23 120/78   11/18/22 128/78    Wt Readings from Last 3 Encounters:   08/25/23 116.9 kg (257 lb 11.2 oz)   07/07/23 120.7 kg (266 lb)   11/18/22 128.1 kg (282 lb 6.4 oz)                  Patient Active Problem List   Diagnosis    Morbid obesity (H)    Autism spectrum disorder    Moderate episode of recurrent major depressive disorder (H)    Hypothyroidism, unspecified type    YARA (generalized anxiety disorder)    Mild intellectual disability    Attention deficit hyperactivity disorder (ADHD), unspecified ADHD type    Oppositional defiant disorder    History of seizures    Lactose intolerance    Migraine without aura and  "without status migrainosus, not intractable    Non-seasonal allergic rhinitis due to pollen    Gastroesophageal reflux disease without esophagitis    Constipation, unspecified constipation type    Lives in group home    Polycystic ovary syndrome    Peripheral vision loss, bilateral    Impaired physical mobility    Hirsutism    Eczema    Borderline personality disorder (H)    Seizures (H)     Past Surgical History:   Procedure Laterality Date    BRAIN SURGERY      for seizures. 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\"    BRAIN SURGERY      for epilepsy age 4    TOENAIL EXCISION Right     ingrowing toenail removal     WISDOM TOOTH EXTRACTION         Social History     Tobacco Use    Smoking status: Never    Smokeless tobacco: Never   Substance Use Topics    Alcohol use: Never     Family History   Problem Relation Age of Onset    Diabetes Mother     Coronary Artery Disease Mother     Obesity Mother     Breast Cancer Paternal Aunt 50    Bipolar Disorder Mother            Breast Cancer Screenin/25/2023    10:44 AM   Breast CA Risk Assessment (FHS-7)   Do you have a family history of breast, colon, or ovarian cancer? No / Unknown         Patient under 40 years of age: Routine Mammogram Screening not recommended.   Pertinent mammograms are reviewed under the imaging tab.    History of abnormal Pap smear: NO - age 21-29 PAP every 3 years recommended      3/13/2020    11:36 AM   PAP / HPV   PAP (Historical) NIL      Reviewed and updated as needed this visit by clinical staff   Tobacco  Allergies  Meds  Problems  Med Hx  Surg Hx  Fam Hx          Reviewed and updated as needed this visit by Provider   Tobacco  Allergies  Meds  Problems  Med Hx  Surg Hx  Fam Hx         Past Medical History:   Diagnosis Date    Anxiety and depression     Autism     Autism spectrum disorder     Dyslipidemia     Eczema     GERD (gastroesophageal reflux disease)     History of seizures     Last " "seizure in her teens    Hypothyroidism     Impaired physical mobility     Migraine     Morbid obesity with BMI of 60.0-69.9, adult (H)     Oppositional defiant disorder     Peripheral vision loss, bilateral     Polycystic ovary syndrome     Seasonal allergies       Past Surgical History:   Procedure Laterality Date    BRAIN SURGERY  2000    for seizures. 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\"    BRAIN SURGERY  2000    for epilepsy age 4    TOENAIL EXCISION Right     ingrowing toenail removal     WISDOM TOOTH EXTRACTION         Review of Systems   Constitutional:  Negative for chills and fever.   HENT:  Negative for congestion, ear pain, hearing loss and sore throat.    Eyes:  Negative for pain and visual disturbance.   Respiratory:  Negative for cough and shortness of breath.    Cardiovascular:  Negative for chest pain, palpitations and peripheral edema.   Gastrointestinal:  Negative for abdominal pain, constipation, diarrhea, heartburn, hematochezia and nausea.   Breasts:  Negative for tenderness, breast mass and discharge.   Genitourinary:  Positive for vaginal discharge. Negative for dysuria, frequency, genital sores, hematuria, pelvic pain, urgency and vaginal bleeding.   Musculoskeletal:  Negative for arthralgias, joint swelling and myalgias.   Skin:  Negative for rash.   Neurological:  Positive for headaches. Negative for dizziness, weakness and paresthesias.   Psychiatric/Behavioral:  Positive for mood changes. The patient is not nervous/anxious.           OBJECTIVE:   /70   Pulse 63   Temp 98.6  F (37  C) (Tympanic)   Resp 22   Ht 1.549 m (5' 1\")   Wt 116.9 kg (257 lb 11.2 oz)   SpO2 99%   BMI 48.69 kg/m    Physical Exam  GENERAL: healthy, alert and no distress  EYES: Eyes grossly normal to inspection, PERRL and conjunctivae and sclerae normal  HENT: ear canals and TM's normal, nose and mouth without ulcers or lesions  NECK: no adenopathy, no asymmetry, masses, or scars and " thyroid normal to palpation  RESP: lungs clear to auscultation - no rales, rhonchi or wheezes  BREAST: normal without masses, tenderness or nipple discharge and no palpable axillary masses or adenopathy  CV: regular rate and rhythm, normal S1 S2, no S3 or S4, no murmur, click or rub, no peripheral edema and peripheral pulses strong  ABDOMEN: soft, nontender, no hepatosplenomegaly, no masses and bowel sounds normal   (female): normal female external genitalia, normal urethral meatus, vaginal mucosa  Unable to perform PAP/speculum exam due to patient's discomfort.  MS: no gross musculoskeletal defects noted, no edema  SKIN: no suspicious lesions or rashes  NEURO: Normal strength and tone, mentation intact and speech normal  BACK: no CVA tenderness, no paralumbar tenderness  PSYCH: Alert and oriented times 3; speech- coherent , normal rate and loud volume; + tangential thoughts, no hallucinations or delusions, affect- flat   LYMPH: no cervical, supraclavicular, axillary, or inguinal adenopathy    Diagnostic Test Results:  Labs reviewed in Epic    ASSESSMENT/PLAN:   ASSESSMENT/PLAN:      ICD-10-CM    1. Encounter for routine adult health examination without abnormal findings  Z00.00       2. Hypothyroidism, unspecified type  E03.9       3. Migraine without aura and without status migrainosus, not intractable  G43.009 SUMAtriptan (IMITREX) 50 MG tablet      4. Morbid obesity (H)  E66.01 Adult Comprehensive Weight Management  Referral      5. OAB (overactive bladder)  N32.81 Ob/Gyn Referral      6. Encounter for preconception consultation  Z31.69 Ob/Gyn Referral      7. Nasal congestion  R09.81 Adult ENT  Referral      8. Borderline personality disorder (H)  F60.3       9. Seizures (H)  R56.9       10. Moderate episode of recurrent major depressive disorder (H)  F33.1       11. Mild intellectual disability  F70       12. Lives in group home  Z59.3       13. History of seizures  Z87.898         Patient  in today with staff member from [facility]. Filled out yearly forms for [facility].  Patient very focused today on various issues pertaining to her wanting more freedom and more choices. She would like more power to choose her meals and snacks, and would like to talk to the nutritionist again. She feels she is making good food choices and is more independent in that she is helping staff cook meals.  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 She would like to move to [place] with him and they would like to have children. She is considering stopping depo provera because she would like multiple children. She states that her legal guardian does not want her to stop the depo provera. I recommend she discuss this with OBGYN and with her psychiatrist given the medications she is taking.  Her staff member does not think these things are likely possibilities.    - overactive bladder/incontinence: per caregiver did not start detrol. Will trial this. Recommended risks and benefits.   - unable to complete PAP smear at this time due to patient discomfort. Patient is not sexually active.  Recommended follow up with OBGYN for these issues.    - nasal congestion, chronic. Offered medication such as nasal spray. Patient would like to see ENT instead for further evaluation.      - seizures, migraine: follows with neurology. Rare use of triptan.  - obesity: discussed healthy diet/exercise guidelines. She would like to follow up with nutritionist again.  - hypothyroidism: recent normal TSH  - MDD/YARA/BPD/mild intellectual disability: lives in group home. Has legal guardian, follows with psychiatry.    COUNSELING:  Reviewed preventive health counseling, as reflected in patient instructions       Regular exercise       Healthy diet/nutrition       Contraception       Family planning      BMI:   Estimated body mass index is 48.69 kg/m  as calculated from the following:    Height as of this  "encounter: 1.549 m (5' 1\").    Weight as of this encounter: 116.9 kg (257 lb 11.2 oz).   Weight management plan: Patient referred to endocrine and/or weight management specialty      She reports that she has never smoked. She has never used smokeless tobacco.          Su Travis PA-C  [facility]  "

## 2023-08-30 ENCOUNTER — TELEPHONE (OUTPATIENT)
Dept: FAMILY MEDICINE | Facility: CLINIC | Age: 28
End: 2023-08-30
Payer: MEDICAID

## 2023-08-30 NOTE — TELEPHONE ENCOUNTER
Yvonne called with questions related to recent office visit, documentation was reviewed with her, all questions were answered. Jyothi Juarez RN

## 2023-10-13 ENCOUNTER — TRANSFERRED RECORDS (OUTPATIENT)
Dept: HEALTH INFORMATION MANAGEMENT | Facility: CLINIC | Age: 28
End: 2023-10-13
Payer: MEDICAID

## 2023-10-13 DIAGNOSIS — Z79.899 HIGH RISK MEDICATION USE: Primary | ICD-10-CM

## 2023-10-27 ENCOUNTER — ALLIED HEALTH/NURSE VISIT (OUTPATIENT)
Dept: FAMILY MEDICINE | Facility: CLINIC | Age: 28
End: 2023-10-27
Payer: MEDICAID

## 2023-10-27 DIAGNOSIS — Z30.9 CONTRACEPTIVE MANAGEMENT: Primary | ICD-10-CM

## 2023-10-27 LAB — HCG UR QL: NEGATIVE

## 2023-10-27 PROCEDURE — 99207 PR NO CHARGE NURSE ONLY: CPT

## 2023-10-27 PROCEDURE — 90686 IIV4 VACC NO PRSV 0.5 ML IM: CPT

## 2023-10-27 PROCEDURE — 81025 URINE PREGNANCY TEST: CPT

## 2023-10-27 PROCEDURE — 96372 THER/PROPH/DIAG INJ SC/IM: CPT | Performed by: FAMILY MEDICINE

## 2023-10-27 PROCEDURE — 90471 IMMUNIZATION ADMIN: CPT

## 2023-10-27 RX ADMIN — MEDROXYPROGESTERONE ACETATE 150 MG: 150 INJECTION, SUSPENSION INTRAMUSCULAR at 09:18

## 2023-11-01 ENCOUNTER — TELEPHONE (OUTPATIENT)
Dept: FAMILY MEDICINE | Facility: CLINIC | Age: 28
End: 2023-11-01

## 2023-11-01 NOTE — TELEPHONE ENCOUNTER
Call placed to Yvonne.  Relayed R Thaddeus message.  Yvonne will fax over Patient's MAR's tomorrow and high light ones that need to be discontinued.  Will also try call patients pharmacy to see if they can take the medications off patient's MAR.  Sergio Quintanilla RN

## 2023-11-01 NOTE — TELEPHONE ENCOUNTER
josefa from [facility]-- called requesting to update medication list     Concerns about meds on med list that patient is not using.  -unable to get famotidine liquid,  -miralox was discontinue from daily meds --last fill was 8/2020   Does problems with constipation.   -terbinafine tablet 250 is on med list --patient not currently taking. For over 3 years.     Please send discontinue order to pharmacy: fax [phone]

## 2023-11-01 NOTE — TELEPHONE ENCOUNTER
I discontinued miralax on her med list here but the others are not on her epic med list. Not sure how to put in discontinue orders when they are not on our med list.  Katelyn Travis PA-C

## 2023-11-28 ENCOUNTER — LAB (OUTPATIENT)
Dept: LAB | Facility: CLINIC | Age: 28
End: 2023-11-28
Payer: MEDICAID

## 2023-11-28 DIAGNOSIS — J30.1 SEASONAL ALLERGIC RHINITIS DUE TO POLLEN: ICD-10-CM

## 2023-11-28 DIAGNOSIS — Z79.899 HIGH RISK MEDICATION USE: ICD-10-CM

## 2023-11-28 LAB
ALBUMIN SERPL BCG-MCNC: 4.2 G/DL (ref 3.5–5.2)
ALP SERPL-CCNC: 60 U/L (ref 40–150)
ALT SERPL W P-5'-P-CCNC: 52 U/L (ref 0–50)
AMYLASE SERPL-CCNC: 40 U/L (ref 28–100)
ANION GAP SERPL CALCULATED.3IONS-SCNC: 11 MMOL/L (ref 7–15)
AST SERPL W P-5'-P-CCNC: 48 U/L (ref 0–45)
BASOPHILS # BLD AUTO: 0 10E3/UL (ref 0–0.2)
BASOPHILS NFR BLD AUTO: 0 %
BILIRUB DIRECT SERPL-MCNC: <0.2 MG/DL (ref 0–0.3)
BILIRUB SERPL-MCNC: 0.5 MG/DL
BUN SERPL-MCNC: 14 MG/DL (ref 6–20)
CALCIUM SERPL-MCNC: 9.4 MG/DL (ref 8.6–10)
CHLORIDE SERPL-SCNC: 107 MMOL/L (ref 98–107)
CHOLEST SERPL-MCNC: 146 MG/DL
CREAT SERPL-MCNC: 0.94 MG/DL (ref 0.51–0.95)
DEPRECATED HCO3 PLAS-SCNC: 22 MMOL/L (ref 22–29)
EGFRCR SERPLBLD CKD-EPI 2021: 85 ML/MIN/1.73M2
EOSINOPHIL # BLD AUTO: 0.3 10E3/UL (ref 0–0.7)
EOSINOPHIL NFR BLD AUTO: 5 %
ERYTHROCYTE [DISTWIDTH] IN BLOOD BY AUTOMATED COUNT: 12 % (ref 10–15)
GGT SERPL-CCNC: 56 U/L (ref 5–36)
GLUCOSE SERPL-MCNC: 92 MG/DL (ref 70–99)
HBA1C MFR BLD: 4.3 % (ref 0–5.6)
HCT VFR BLD AUTO: 36.6 % (ref 35–47)
HDLC SERPL-MCNC: 38 MG/DL
HGB BLD-MCNC: 11.7 G/DL (ref 11.7–15.7)
IMM GRANULOCYTES # BLD: 0 10E3/UL
IMM GRANULOCYTES NFR BLD: 0 %
LDLC SERPL CALC-MCNC: 87 MG/DL
LITHIUM SERPL-SCNC: 1.54 MMOL/L (ref 0.6–1.2)
LYMPHOCYTES # BLD AUTO: 2.1 10E3/UL (ref 0.8–5.3)
LYMPHOCYTES NFR BLD AUTO: 32 %
MCH RBC QN AUTO: 30.5 PG (ref 26.5–33)
MCHC RBC AUTO-ENTMCNC: 32 G/DL (ref 31.5–36.5)
MCV RBC AUTO: 95 FL (ref 78–100)
MONOCYTES # BLD AUTO: 0.8 10E3/UL (ref 0–1.3)
MONOCYTES NFR BLD AUTO: 12 %
NEUTROPHILS # BLD AUTO: 3.3 10E3/UL (ref 1.6–8.3)
NEUTROPHILS NFR BLD AUTO: 51 %
NONHDLC SERPL-MCNC: 108 MG/DL
PLATELET # BLD AUTO: 125 10E3/UL (ref 150–450)
POTASSIUM SERPL-SCNC: 4.5 MMOL/L (ref 3.4–5.3)
PROLACTIN SERPL 3RD IS-MCNC: 26 NG/ML (ref 5–23)
PROT SERPL-MCNC: 6.5 G/DL (ref 6.4–8.3)
RBC # BLD AUTO: 3.84 10E6/UL (ref 3.8–5.2)
SODIUM SERPL-SCNC: 140 MMOL/L (ref 135–145)
T4 FREE SERPL-MCNC: 1.09 NG/DL (ref 0.9–1.7)
TRIGL SERPL-MCNC: 107 MG/DL
TSH SERPL DL<=0.005 MIU/L-ACNC: 3.64 UIU/ML (ref 0.3–4.2)
VALPROATE SERPL-MCNC: 83 UG/ML
WBC # BLD AUTO: 6.4 10E3/UL (ref 4–11)

## 2023-11-28 PROCEDURE — 36415 COLL VENOUS BLD VENIPUNCTURE: CPT

## 2023-11-28 PROCEDURE — 80061 LIPID PANEL: CPT

## 2023-11-28 PROCEDURE — 82150 ASSAY OF AMYLASE: CPT

## 2023-11-28 PROCEDURE — 84146 ASSAY OF PROLACTIN: CPT

## 2023-11-28 PROCEDURE — 80178 ASSAY OF LITHIUM: CPT

## 2023-11-28 PROCEDURE — 80053 COMPREHEN METABOLIC PANEL: CPT

## 2023-11-28 PROCEDURE — 83036 HEMOGLOBIN GLYCOSYLATED A1C: CPT

## 2023-11-28 PROCEDURE — 82248 BILIRUBIN DIRECT: CPT

## 2023-11-28 PROCEDURE — 82977 ASSAY OF GGT: CPT

## 2023-11-28 PROCEDURE — 84439 ASSAY OF FREE THYROXINE: CPT

## 2023-11-28 PROCEDURE — 80164 ASSAY DIPROPYLACETIC ACD TOT: CPT

## 2023-11-28 PROCEDURE — 85025 COMPLETE CBC W/AUTO DIFF WBC: CPT

## 2023-11-28 PROCEDURE — 84443 ASSAY THYROID STIM HORMONE: CPT

## 2023-11-28 RX ORDER — LORATADINE 10 MG/1
TABLET ORAL
Start: 2023-11-28

## 2024-01-16 ENCOUNTER — ALLIED HEALTH/NURSE VISIT (OUTPATIENT)
Dept: FAMILY MEDICINE | Facility: CLINIC | Age: 29
End: 2024-01-16
Payer: MEDICAID

## 2024-01-16 DIAGNOSIS — Z30.42 ENCOUNTER FOR SURVEILLANCE OF INJECTABLE CONTRACEPTIVE: Primary | ICD-10-CM

## 2024-01-16 PROCEDURE — 96372 THER/PROPH/DIAG INJ SC/IM: CPT | Performed by: FAMILY MEDICINE

## 2024-01-16 RX ADMIN — MEDROXYPROGESTERONE ACETATE 150 MG: 150 INJECTION, SUSPENSION INTRAMUSCULAR at 13:46

## 2024-01-16 NOTE — PROGRESS NOTES
Clinic Administered Medication Documentation      Depo Provera Documentation    Depo-Provera Standing Order inclusion/exclusion criteria reviewed.     Is this the initial or subsequent dose of Depo Provera? Subsequent dose - patient is within the acceptable window of time (11-15 weeks) for subsequent injection. Pregnancy test not indicated.    Patient meets: inclusion criteria     Is there an active order (written within the past 365 days, with administrations remaining, not ) in the chart? Yes.     Prior to injection, verified patient identity using patient's name and date of birth. Medication was administered. Please see MAR and medication order for additional information.     Vial/Syringe: Single dose vial. Was entire vial of medication used? Yes    Patient instructed to remain in clinic for 15 minutes and report any adverse reaction to staff immediately.  NEXT INJECTION DUE: 24 - 24    GIVEN IN LEFT GLUTEUS EMMETT.    Verified that the patient has refills remaining in their prescription.        Maxine Fuentes, CMA

## 2024-02-06 NOTE — TELEPHONE ENCOUNTER
LACTASE ENZ 3000IU TAB      **Duplicate**  
LACTASE ENZYME 3000 units tablet 100 tablet 1 12/18/2019  No   Sig: TAKE 3 TABLETS BY MOUTH 3 TIMES DAILY AS NEEDED (9000 UNITS) WITH FOOD CONTAINING LACTOSE   Sent to pharmacy as: LACTASE ENZYME 3000 units tablet   Class: E-Prescribe   Order: [id number]   E-Prescribing Status: Receipt confirmed by pharmacy (12/18/2019  1:35 PM CST)   Printout Tracking     External Result Report   Pharmacy     [address]       
DISCHARGE

## 2024-04-04 ENCOUNTER — ALLIED HEALTH/NURSE VISIT (OUTPATIENT)
Dept: FAMILY MEDICINE | Facility: CLINIC | Age: 29
End: 2024-04-04
Payer: MEDICAID

## 2024-04-04 DIAGNOSIS — Z30.42 ENCOUNTER FOR SURVEILLANCE OF INJECTABLE CONTRACEPTIVE: Primary | ICD-10-CM

## 2024-04-04 PROCEDURE — 99207 PR NO CHARGE NURSE ONLY: CPT

## 2024-04-04 PROCEDURE — 96372 THER/PROPH/DIAG INJ SC/IM: CPT | Performed by: FAMILY MEDICINE

## 2024-04-04 RX ADMIN — MEDROXYPROGESTERONE ACETATE 150 MG: 150 INJECTION, SUSPENSION INTRAMUSCULAR at 11:30

## 2024-04-04 NOTE — PROGRESS NOTES
Clinic Administered Medication Documentation      Depo Provera Documentation    Depo-Provera Standing Order inclusion/exclusion criteria reviewed.     Is this the initial or subsequent dose of Depo Provera? Subsequent dose - patient is within the acceptable window of time (11-15 weeks) for subsequent injection. Pregnancy test not indicated.    Patient meets: inclusion criteria     Is there an active order (written within the past 365 days, with administrations remaining, not ) in the chart? Yes.     Prior to injection, verified patient identity using patient's name and date of birth. Medication was administered. Please see MAR and medication order for additional information.     Vial/Syringe: Single dose vial. Was entire vial of medication used? Yes    Patient instructed to remain in clinic for 15 minutes and report any adverse reaction to staff immediately.  NEXT INJECTION DUE: 24 - 24    Patient has no refills remaining.  Patient will schedule appointment with PCP.

## 2024-04-08 ENCOUNTER — OFFICE VISIT (OUTPATIENT)
Dept: FAMILY MEDICINE | Facility: CLINIC | Age: 29
End: 2024-04-08
Payer: MEDICAID

## 2024-04-08 VITALS
DIASTOLIC BLOOD PRESSURE: 76 MMHG | WEIGHT: 251.2 LBS | SYSTOLIC BLOOD PRESSURE: 112 MMHG | BODY MASS INDEX: 47.46 KG/M2 | TEMPERATURE: 98.7 F | HEART RATE: 62 BPM

## 2024-04-08 DIAGNOSIS — R56.9 SEIZURES (H): ICD-10-CM

## 2024-04-08 DIAGNOSIS — Z30.42 ENCOUNTER FOR SURVEILLANCE OF INJECTABLE CONTRACEPTIVE: ICD-10-CM

## 2024-04-08 DIAGNOSIS — R79.89 HIGH SERUM FERRITIN: ICD-10-CM

## 2024-04-08 DIAGNOSIS — N32.81 OVERACTIVE BLADDER: ICD-10-CM

## 2024-04-08 DIAGNOSIS — E03.9 HYPOTHYROIDISM, UNSPECIFIED TYPE: ICD-10-CM

## 2024-04-08 DIAGNOSIS — G43.009 MIGRAINE WITHOUT AURA AND WITHOUT STATUS MIGRAINOSUS, NOT INTRACTABLE: Chronic | ICD-10-CM

## 2024-04-08 DIAGNOSIS — N89.8 VAGINAL IRRITATION: Primary | ICD-10-CM

## 2024-04-08 DIAGNOSIS — N39.41 URGE INCONTINENCE OF URINE: ICD-10-CM

## 2024-04-08 DIAGNOSIS — F33.1 MODERATE EPISODE OF RECURRENT MAJOR DEPRESSIVE DISORDER (H): Chronic | ICD-10-CM

## 2024-04-08 LAB
BASOPHILS # BLD AUTO: 0 10E3/UL (ref 0–0.2)
BASOPHILS NFR BLD AUTO: 1 %
CLUE CELLS: NORMAL
EOSINOPHIL # BLD AUTO: 0.3 10E3/UL (ref 0–0.7)
EOSINOPHIL NFR BLD AUTO: 5 %
ERYTHROCYTE [DISTWIDTH] IN BLOOD BY AUTOMATED COUNT: 12.5 % (ref 10–15)
FERRITIN SERPL-MCNC: 573 NG/ML (ref 6–175)
HCT VFR BLD AUTO: 39.1 % (ref 35–47)
HGB BLD-MCNC: 12.5 G/DL (ref 11.7–15.7)
IMM GRANULOCYTES # BLD: 0 10E3/UL
IMM GRANULOCYTES NFR BLD: 0 %
LYMPHOCYTES # BLD AUTO: 2.5 10E3/UL (ref 0.8–5.3)
LYMPHOCYTES NFR BLD AUTO: 37 %
MCH RBC QN AUTO: 30.4 PG (ref 26.5–33)
MCHC RBC AUTO-ENTMCNC: 32 G/DL (ref 31.5–36.5)
MCV RBC AUTO: 95 FL (ref 78–100)
MONOCYTES # BLD AUTO: 0.6 10E3/UL (ref 0–1.3)
MONOCYTES NFR BLD AUTO: 9 %
NEUTROPHILS # BLD AUTO: 3.4 10E3/UL (ref 1.6–8.3)
NEUTROPHILS NFR BLD AUTO: 49 %
PLATELET # BLD AUTO: 192 10E3/UL (ref 150–450)
RBC # BLD AUTO: 4.11 10E6/UL (ref 3.8–5.2)
TRICHOMONAS, WET PREP: NORMAL
TSH SERPL DL<=0.005 MIU/L-ACNC: 3.16 UIU/ML (ref 0.3–4.2)
WBC # BLD AUTO: 6.9 10E3/UL (ref 4–11)
WBC'S/HIGH POWER FIELD, WET PREP: NORMAL
YEAST, WET PREP: NORMAL

## 2024-04-08 PROCEDURE — 82728 ASSAY OF FERRITIN: CPT | Performed by: PHYSICIAN ASSISTANT

## 2024-04-08 PROCEDURE — 99213 OFFICE O/P EST LOW 20 MIN: CPT | Performed by: PHYSICIAN ASSISTANT

## 2024-04-08 PROCEDURE — 36415 COLL VENOUS BLD VENIPUNCTURE: CPT | Performed by: PHYSICIAN ASSISTANT

## 2024-04-08 PROCEDURE — 84443 ASSAY THYROID STIM HORMONE: CPT | Performed by: PHYSICIAN ASSISTANT

## 2024-04-08 PROCEDURE — 85025 COMPLETE CBC W/AUTO DIFF WBC: CPT | Performed by: PHYSICIAN ASSISTANT

## 2024-04-08 PROCEDURE — 87210 SMEAR WET MOUNT SALINE/INK: CPT | Performed by: PHYSICIAN ASSISTANT

## 2024-04-08 RX ORDER — PROPRANOLOL HYDROCHLORIDE 20 MG/1
20 TABLET ORAL 3 TIMES DAILY
COMMUNITY
Start: 2024-02-11

## 2024-04-08 RX ORDER — MEDROXYPROGESTERONE ACETATE 150 MG/ML
150 INJECTION, SUSPENSION INTRAMUSCULAR
Status: ACTIVE | OUTPATIENT
Start: 2024-06-20 | End: 2025-06-14

## 2024-04-08 RX ORDER — TOLTERODINE 2 MG/1
2 CAPSULE, EXTENDED RELEASE ORAL DAILY
Qty: 28 CAPSULE | Refills: 12 | Status: SHIPPED | OUTPATIENT
Start: 2024-04-08

## 2024-04-08 RX ORDER — LEVOTHYROXINE SODIUM 75 UG/1
75 TABLET ORAL DAILY
Qty: 28 TABLET | Refills: 12 | Status: CANCELLED | OUTPATIENT
Start: 2024-04-08

## 2024-04-08 NOTE — PROGRESS NOTES
"  Assessment & Plan       ICD-10-CM    1. Vaginal irritation  N89.8 Wet prep - Clinic Collect     Ob/Gyn  Referral      2. Hypothyroidism, unspecified type  E03.9 TSH with free T4 reflex     CBC with platelets and differential     Ferritin     TSH with free T4 reflex     CBC with platelets and differential     Ferritin     levothyroxine (SYNTHROID/LEVOTHROID) 75 MCG tablet      3. Seizures (H)  R56.9       4. Migraine without aura and without status migrainosus, not intractable  G43.009       5. Moderate episode of recurrent major depressive disorder (H)  F33.1       6. Urge incontinence of urine  N39.41 tolterodine ER (DETROL LA) 2 MG 24 hr capsule      7. Overactive bladder  N32.81 tolterodine ER (DETROL LA) 2 MG 24 hr capsule      8. Encounter for surveillance of injectable contraceptive  Z30.42 medroxyPROGESTERone (DEPO-PROVERA) injection 150 mg      9. High serum ferritin  R79.89 Ferritin            Patient is accompanied by group home staff member.  Staff member states she was told that Jane needs her ferritin checked, she isn't sure why.    Patient interested in seeing OBGYN for chronic vaginal irritation as well as for PAP smear. We discontinued the exam last visit due to discomfort. Even today with wet prep patient was anxious and uncomfortable. Recommended try her PRN olanzapine prior to that visit.    Vaginal irritation:  suspect related to hygiene which was discussed. Will check wet prep to rule out infection.    OAB: improved per staff with detrol. Continue.      MDD/mental health: follows with psychiatry    Seizures: follows with neurology    Migraines: well controlled. Rare use of imitrex.    Hypothyroidism: recheck labs today. On synthroid.    Recommended follow up for preventive visit.      BMI  Estimated body mass index is 47.46 kg/m  as calculated from the following:    Height as of 8/25/23: 1.549 m (5' 1\").    Weight as of this encounter: 113.9 kg (251 lb 3.2 oz).   Weight management " plan: Discussed healthy diet and exercise guidelines she is losing weight, walking more      Subjective   Jane is a 29 year old, presenting for the following health issues:  Recheck Medication (Follow up on labs)        4/8/2024     1:25 PM   Additional Questions   Roomed by Juanita   Accompanied by With Brandy   Failed to redirect to the Timeline version of the [employer] SmartLink.  HPI       Follow up on medications         Other than noted above, general, HEENT, respiratory, cardiac, MS, and gastrointestinal systems are negative.       Objective    /76   Pulse 62   Temp 98.7  F (37.1  C) (Tympanic)   Wt 113.9 kg (251 lb 3.2 oz)   BMI 47.46 kg/m    Body mass index is 47.46 kg/m .  Physical Exam   GENERAL: alert and no distress  NECK: no adenopathy, no asymmetry, masses, or scars  RESP: lungs clear to auscultation - no rales, rhonchi or wheezes  CV: regular rate and rhythm, normal S1 S2, no S3 or S4, no murmur, click or rub, no peripheral edema   (female): normal female external genitalia, normal urethral meatus, normal vaginal mucosa  MS: no gross musculoskeletal defects noted, no edema    No results found for this or any previous visit (from the past 24 hour(s)).          Signed Electronically by: Su Travis PA-C

## 2024-04-09 RX ORDER — LEVOTHYROXINE SODIUM 75 UG/1
75 TABLET ORAL DAILY
Qty: 28 TABLET | Refills: 12 | Status: SHIPPED | OUTPATIENT
Start: 2024-04-09

## 2024-04-15 ENCOUNTER — TELEPHONE (OUTPATIENT)
Dept: FAMILY MEDICINE | Facility: CLINIC | Age: 29
End: 2024-04-15
Payer: MEDICAID

## 2024-04-15 NOTE — TELEPHONE ENCOUNTER
Hannah (Guardian) notified of results and asked that we call group home for med list. Left [facility] for Yvonne at group home to return call with med list.  LJ Aguilar's ferritin (iron stores) are quite high. Please tell me all vitamins and iron supplements she is currently taking.     Other labs are normal.  Katelyn Travis PA-C

## 2024-04-30 NOTE — TELEPHONE ENCOUNTER
Yvonne called back to clinic. She is going to fax a copy of patients medication list to [phone].     Mitali Peterson RN on 4/30/2024 at 12:30 PM

## 2024-05-15 ENCOUNTER — OFFICE VISIT (OUTPATIENT)
Dept: OTOLARYNGOLOGY | Facility: CLINIC | Age: 29
End: 2024-05-15
Payer: MEDICAID

## 2024-05-15 DIAGNOSIS — R09.81 NASAL CONGESTION: ICD-10-CM

## 2024-05-15 DIAGNOSIS — J30.0 VASOMOTOR RHINITIS: Primary | ICD-10-CM

## 2024-05-15 DIAGNOSIS — H61.22 IMPACTED CERUMEN OF LEFT EAR: ICD-10-CM

## 2024-05-15 PROCEDURE — 99203 OFFICE O/P NEW LOW 30 MIN: CPT | Mod: 25 | Performed by: OTOLARYNGOLOGY

## 2024-05-15 PROCEDURE — 69210 REMOVE IMPACTED EAR WAX UNI: CPT | Performed by: OTOLARYNGOLOGY

## 2024-05-15 RX ORDER — MUPIROCIN 20 MG/G
OINTMENT TOPICAL
Qty: 22 G | Refills: 0 | Status: SHIPPED | OUTPATIENT
Start: 2024-05-15 | End: 2024-08-26

## 2024-05-15 RX ORDER — IPRATROPIUM BROMIDE 21 UG/1
1-2 SPRAY, METERED NASAL 3 TIMES DAILY
Qty: 30 ML | Refills: 0 | Status: SHIPPED | OUTPATIENT
Start: 2024-05-15 | End: 2024-10-01

## 2024-05-15 NOTE — LETTER
5/15/2024         RE: Jane Lee  [id number] [place] [address]        Dear Colleague,    Thank you for referring your patient, Jane Lee, to the [facility]. Please see a copy of my visit note below.    CHIEF COMPLAINT: Patient presents with:  Nose Problem: Frequent runny nose, nasal congestion, sneezing, and frequent sinus infections.         HISTORY OF PRESENT ILLNESS  Jane was seen at the behest of Su Travis Pa-C for nose concern.  Pt present with [facility] staff.   Pt complains of runny nose while she eats, with warm weather. She has tried a nasal spray in the past but she is not sure which one. She has difficulty breathing out of her left nostril.  She has occasional nose bleeds.  She is unsure if she has allergies.          REFERRAL NOTE:    Patient in today with staff member from [facility]. Filled out yearly forms for [facility].  Patient very focused today on various issues pertaining to her wanting more freedom and more choices. She would like more power to choose her meals and snacks, and would like to talk to the nutritionist again. She feels she is making good food choices and is more independent in that she is helping staff cook meals.  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 She would like to move to [place] with him and they would like to have children. She is considering stopping depo provera because she would like multiple children. She states that her legal guardian does not want her to stop the depo provera. I recommend she discuss this with OBGYN and with her psychiatrist given the medications she is taking.  Her staff member does not think these things are likely possibilities.     - overactive bladder/incontinence: per caregiver did not start detrol. Will trial this. Recommended risks and benefits.   - unable to complete PAP smear at this time due to patient discomfort. Patient is not sexually  active.  Recommended follow up with OBGYN for these issues.     - nasal congestion, chronic. Offered medication such as nasal spray. Patient would like to see ENT instead for further evaluation           REVIEW OF SYSTEMS    Review of Systems as per HPI and PMHx, otherwise 10 system review system are negative.       ALLERGIES    Metformin and Lamotrigine    CURRENT MEDICATIONS      Current Outpatient Medications:      albuterol (PROAIR HFA/PROVENTIL HFA/VENTOLIN HFA) 108 (90 Base) MCG/ACT inhaler, Inhale 2 puffs into the lungs, Disp: , Rfl:      famotidine (PEPCID) 20 MG tablet, Take 1 tablet (20 mg) by mouth At Bedtime, Disp: 28 tablet, Rfl: 12     ipratropium (ATROVENT) 0.03 % nasal spray, Spray 1-2 sprays into both nostrils 3 times daily, Disp: 30 mL, Rfl: 0     LACTASE ENZYME 3000 units tablet, TAKE 3 TABLETS BY MOUTH 3 TIMES DAILY AS NEEDED (9000 UNITS) WITH FOOD CONTAINING LACTOSE, Disp: 270 tablet, Rfl: 11     levothyroxine (SYNTHROID/LEVOTHROID) 75 MCG tablet, Take 1 tablet (75 mcg) by mouth daily, Disp: 28 tablet, Rfl: 12     lithium (ESKALITH CR/LITHOBID) 450 MG CR tablet, TAKE 1 TABLET BY MOUTH THREE TIMES DAILY, Disp: , Rfl: 2     loperamide (IMODIUM A-D) 2 MG tablet, Take 1-2 tablets (2-4 mg) by mouth 4 times daily as needed for diarrhea Max 8 per day., Disp: 30 tablet, Rfl: 1     loratadine (CLARITIN) 10 MG tablet, TAKE 1 TABLET BY MOUTH DAILY, Disp: , Rfl:      mupirocin (BACTROBAN) 2 % external ointment, Apply a pea sized amount to left ear sore 3x daily with Qtip for 7 days, Disp: 22 g, Rfl: 0     olopatadine (PATANOL) 0.1 % ophthalmic solution, Place 1 drop into both eyes 2 times daily As needed, Disp: 5 mL, Rfl: 0     propranolol (INDERAL) 20 MG tablet, , Disp: , Rfl:      SUMAtriptan (IMITREX) 50 MG tablet, TAKE 1 TABLET BY MOUTH AT ONSET OF HEADACHE, MAY REPEAT IN 2 HOURS. UP TO TWICE DAILY AS NEEDED, Disp: 9 tablet, Rfl: 0     tolterodine ER (DETROL LA) 2 MG 24 hr capsule, Take 1 capsule (2 mg)  "by mouth daily, Disp: 28 capsule, Rfl: 12     traZODone (DESYREL) 150 MG tablet, TAKE 1 TABLET BY MOUTH DAILY AT BEDTIME, Disp: , Rfl: 3     venlafaxine (EFFEXOR-XR) 75 MG 24 hr capsule, Take 75 mg by mouth daily, Disp: , Rfl: 2    Current Facility-Administered Medications:      [START ON 6/20/2024] medroxyPROGESTERone (DEPO-PROVERA) injection 150 mg, 150 mg, Intramuscular, Q90 Days, Su Travis PA-C     PAST MEDICAL HISTORY    PAST MEDICAL HISTORY:   Past Medical History:   Diagnosis Date     Anxiety and depression      Autism      Autism spectrum disorder      Dyslipidemia      Eczema      GERD (gastroesophageal reflux disease)      History of seizures     Last seizure in her teens     Hypothyroidism      Impaired physical mobility      Migraine      Morbid obesity with BMI of 60.0-69.9, adult (H)      Oppositional defiant disorder      Peripheral vision loss, bilateral      Polycystic ovary syndrome      Seasonal allergies        PAST SURGICAL HISTORY    PAST SURGICAL HISTORY:   Past Surgical History:   Procedure Laterality Date     BRAIN SURGERY  2000    for seizures. 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\"     BRAIN SURGERY  2000    for epilepsy age 4     TOENAIL EXCISION Right     ingrowing toenail removal      WISDOM TOOTH EXTRACTION         FAMILY  HISTORY    FAMILY HISTORY:   Family History   Problem Relation Age of Onset     Diabetes Mother      Coronary Artery Disease Mother      Obesity Mother      Breast Cancer Paternal Aunt 50     Bipolar Disorder Mother        SOCIAL HISTORY    SOCIAL HISTORY:   Social History     Tobacco Use     Smoking status: Never     Smokeless tobacco: Never   Substance Use Topics     Alcohol use: Never        PHYSICAL EXAM    HEAD: Normal appearance and symmetry:  No cutaneous lesions.      NECK:  supple     EARS:    Right:  TM wnl   LEFT:  cerumen impaction noted; TM intact      CERUMEN IMPACTION REMOVAL    After obtaining verbal consent, and using " the binocular microscope.  Cerumen impaction(s) were removed from the affected ear canal(s)  using a wire loops and/or suction.  The patient tolerated the procedure well without incident.      EYES:  EOMI    CN VII/XII:  intact     NOSE:     Dorsum:   straight  Septum:  slight right sided deviation  Mucosa:  moist  Turbinates 2+        ORAL CAVITY/OROPHARYNX:     Lips:  Normal.  Tongue: normal, midline  Mucosa:   no lesions  Tonsils 2+     NECK:  Trachea:  midline.              Thyroid:  normal              Adenopathy:  none        NEURO:   Alert and Oriented     GAIT AND STATION:  normal     RESPIRATORY:   Symmetry and Respiratory effort     PSYCH:  Normal mood and affect     SKIN:   warm and dry         IMPRESSION:    Encounter Diagnoses   Name Primary?     Nasal congestion      Vasomotor rhinitis Yes     Impacted cerumen of left ear           RECOMMENDATIONS:  Cerumen removal  Atrovent nasal spray  Referral to allergist     Orders Placed This Encounter   Procedures     REMOVE IMPACTED CERUMEN     Adult Allergy/Asthma  Referral            Again, thank you for allowing me to participate in the care of your patient.        Sincerely,        Eyal Gonzalez MD

## 2024-05-15 NOTE — PROGRESS NOTES
CHIEF COMPLAINT: Patient presents with:  Nose Problem: Frequent runny nose, nasal congestion, sneezing, and frequent sinus infections.         HISTORY OF PRESENT ILLNESS  Jane was seen at the behest of Su Travis Pa-C for nose concern.  Pt present with [facility] staff.   Pt complains of runny nose while she eats, with warm weather. She has tried a nasal spray in the past but she is not sure which one. She has difficulty breathing out of her left nostril.  She has occasional nose bleeds.  She is unsure if she has allergies.          REFERRAL NOTE:    Patient in today with staff member from [facility]. Filled out yearly forms for [facility].  Patient very focused today on various issues pertaining to her wanting more freedom and more choices. She would like more power to choose her meals and snacks, and would like to talk to the nutritionist again. She feels she is making good food choices and is more independent in that she is helping staff cook meals.  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 She would like to move to [place] with him and they would like to have children. She is considering stopping depo provera because she would like multiple children. She states that her legal guardian does not want her to stop the depo provera. I recommend she discuss this with OBGYN and with her psychiatrist given the medications she is taking.  Her staff member does not think these things are likely possibilities.     - overactive bladder/incontinence: per caregiver did not start detrol. Will trial this. Recommended risks and benefits.   - unable to complete PAP smear at this time due to patient discomfort. Patient is not sexually active.  Recommended follow up with OBGYN for these issues.     - nasal congestion, chronic. Offered medication such as nasal spray. Patient would like to see ENT instead for further evaluation           REVIEW OF SYSTEMS    Review of Systems as per  HPI and PMHx, otherwise 10 system review system are negative.       ALLERGIES    Metformin and Lamotrigine    CURRENT MEDICATIONS      Current Outpatient Medications:     albuterol (PROAIR HFA/PROVENTIL HFA/VENTOLIN HFA) 108 (90 Base) MCG/ACT inhaler, Inhale 2 puffs into the lungs, Disp: , Rfl:     famotidine (PEPCID) 20 MG tablet, Take 1 tablet (20 mg) by mouth At Bedtime, Disp: 28 tablet, Rfl: 12    ipratropium (ATROVENT) 0.03 % nasal spray, Spray 1-2 sprays into both nostrils 3 times daily, Disp: 30 mL, Rfl: 0    LACTASE ENZYME 3000 units tablet, TAKE 3 TABLETS BY MOUTH 3 TIMES DAILY AS NEEDED (9000 UNITS) WITH FOOD CONTAINING LACTOSE, Disp: 270 tablet, Rfl: 11    levothyroxine (SYNTHROID/LEVOTHROID) 75 MCG tablet, Take 1 tablet (75 mcg) by mouth daily, Disp: 28 tablet, Rfl: 12    lithium (ESKALITH CR/LITHOBID) 450 MG CR tablet, TAKE 1 TABLET BY MOUTH THREE TIMES DAILY, Disp: , Rfl: 2    loperamide (IMODIUM A-D) 2 MG tablet, Take 1-2 tablets (2-4 mg) by mouth 4 times daily as needed for diarrhea Max 8 per day., Disp: 30 tablet, Rfl: 1    loratadine (CLARITIN) 10 MG tablet, TAKE 1 TABLET BY MOUTH DAILY, Disp: , Rfl:     mupirocin (BACTROBAN) 2 % external ointment, Apply a pea sized amount to left ear sore 3x daily with Qtip for 7 days, Disp: 22 g, Rfl: 0    olopatadine (PATANOL) 0.1 % ophthalmic solution, Place 1 drop into both eyes 2 times daily As needed, Disp: 5 mL, Rfl: 0    propranolol (INDERAL) 20 MG tablet, , Disp: , Rfl:     SUMAtriptan (IMITREX) 50 MG tablet, TAKE 1 TABLET BY MOUTH AT ONSET OF HEADACHE, MAY REPEAT IN 2 HOURS. UP TO TWICE DAILY AS NEEDED, Disp: 9 tablet, Rfl: 0    tolterodine ER (DETROL LA) 2 MG 24 hr capsule, Take 1 capsule (2 mg) by mouth daily, Disp: 28 capsule, Rfl: 12    traZODone (DESYREL) 150 MG tablet, TAKE 1 TABLET BY MOUTH DAILY AT BEDTIME, Disp: , Rfl: 3    venlafaxine (EFFEXOR-XR) 75 MG 24 hr capsule, Take 75 mg by mouth daily, Disp: , Rfl: 2    Current Facility-Administered  "Medications:     [START ON 6/20/2024] medroxyPROGESTERone (DEPO-PROVERA) injection 150 mg, 150 mg, Intramuscular, Q90 Days, Su Travis PA-C     PAST MEDICAL HISTORY    PAST MEDICAL HISTORY:   Past Medical History:   Diagnosis Date    Anxiety and depression     Autism     Autism spectrum disorder     Dyslipidemia     Eczema     GERD (gastroesophageal reflux disease)     History of seizures     Last seizure in her teens    Hypothyroidism     Impaired physical mobility     Migraine     Morbid obesity with BMI of 60.0-69.9, adult (H)     Oppositional defiant disorder     Peripheral vision loss, bilateral     Polycystic ovary syndrome     Seasonal allergies        PAST SURGICAL HISTORY    PAST SURGICAL HISTORY:   Past Surgical History:   Procedure Laterality Date    BRAIN SURGERY  2000    for seizures. 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\"    BRAIN SURGERY  2000    for epilepsy age 4    TOENAIL EXCISION Right     ingrowing toenail removal     WISDOM TOOTH EXTRACTION         FAMILY  HISTORY    FAMILY HISTORY:   Family History   Problem Relation Age of Onset    Diabetes Mother     Coronary Artery Disease Mother     Obesity Mother     Breast Cancer Paternal Aunt 50    Bipolar Disorder Mother        SOCIAL HISTORY    SOCIAL HISTORY:   Social History     Tobacco Use    Smoking status: Never    Smokeless tobacco: Never   Substance Use Topics    Alcohol use: Never        PHYSICAL EXAM    HEAD: Normal appearance and symmetry:  No cutaneous lesions.      NECK:  supple     EARS:    Right:  TM wnl   LEFT:  cerumen impaction noted; TM intact      CERUMEN IMPACTION REMOVAL    After obtaining verbal consent, and using the binocular microscope.  Cerumen impaction(s) were removed from the affected ear canal(s)  using a wire loops and/or suction.  The patient tolerated the procedure well without incident.      EYES:  EOMI    CN VII/XII:  intact     NOSE:     Dorsum:   straight  Septum:  slight right sided " deviation  Mucosa:  moist  Turbinates 2+        ORAL CAVITY/OROPHARYNX:     Lips:  Normal.  Tongue: normal, midline  Mucosa:   no lesions  Tonsils 2+     NECK:  Trachea:  midline.              Thyroid:  normal              Adenopathy:  none        NEURO:   Alert and Oriented     GAIT AND STATION:  normal     RESPIRATORY:   Symmetry and Respiratory effort     PSYCH:  Normal mood and affect     SKIN:   warm and dry         IMPRESSION:    Encounter Diagnoses   Name Primary?    Nasal congestion     Vasomotor rhinitis Yes    Impacted cerumen of left ear           RECOMMENDATIONS:  Cerumen removal  Atrovent nasal spray  Referral to allergist     Orders Placed This Encounter   Procedures    REMOVE IMPACTED CERUMEN    Adult Allergy/Asthma  Referral

## 2024-05-15 NOTE — PATIENT INSTRUCTIONS
"-Cerumen (ear wax) removal left ear today.  -Atrovent nasal spray- use up to three times per day. Use your left hand to spray the right nostril. Use your right hand to spray your left nostril.  -Mupirocin ointment three times daily for a week. Put ointment on sore on left ear.   -Change loratidine to \"as needed for allergy symptoms\" instead of scheduled/daily.  -Referral to allergist for allergy testing- you will receive a phone call to schedule this appointment.    -Stop loratidine at least one week prior to Allergist appointment.  "

## 2024-05-31 ENCOUNTER — TRANSFERRED RECORDS (OUTPATIENT)
Dept: HEALTH INFORMATION MANAGEMENT | Facility: CLINIC | Age: 29
End: 2024-05-31
Payer: MEDICAID

## 2024-05-31 DIAGNOSIS — F33.41 RECURRENT MAJOR DEPRESSION IN PARTIAL REMISSION (H): Primary | ICD-10-CM

## 2024-06-04 ENCOUNTER — LAB (OUTPATIENT)
Dept: LAB | Facility: CLINIC | Age: 29
End: 2024-06-04
Payer: MEDICAID

## 2024-06-04 DIAGNOSIS — R79.89 HIGH SERUM FERRITIN: ICD-10-CM

## 2024-06-04 DIAGNOSIS — F33.41 RECURRENT MAJOR DEPRESSION IN PARTIAL REMISSION (H): ICD-10-CM

## 2024-06-04 LAB
ALT SERPL W P-5'-P-CCNC: 33 U/L (ref 0–50)
AMYLASE SERPL-CCNC: 59 U/L (ref 28–100)
ANION GAP SERPL CALCULATED.3IONS-SCNC: 15 MMOL/L (ref 7–15)
AST SERPL W P-5'-P-CCNC: 29 U/L (ref 0–45)
BASOPHILS # BLD AUTO: 0 10E3/UL (ref 0–0.2)
BASOPHILS NFR BLD AUTO: 0 %
BUN SERPL-MCNC: 16 MG/DL (ref 6–20)
CALCIUM SERPL-MCNC: 9.3 MG/DL (ref 8.6–10)
CHLORIDE SERPL-SCNC: 102 MMOL/L (ref 98–107)
CREAT SERPL-MCNC: 0.93 MG/DL (ref 0.51–0.95)
DEPRECATED HCO3 PLAS-SCNC: 18 MMOL/L (ref 22–29)
EGFRCR SERPLBLD CKD-EPI 2021: 86 ML/MIN/1.73M2
EOSINOPHIL # BLD AUTO: 0.3 10E3/UL (ref 0–0.7)
EOSINOPHIL NFR BLD AUTO: 4 %
ERYTHROCYTE [DISTWIDTH] IN BLOOD BY AUTOMATED COUNT: 11.7 % (ref 10–15)
FERRITIN SERPL-MCNC: 534 NG/ML (ref 6–175)
GLUCOSE SERPL-MCNC: 91 MG/DL (ref 70–99)
HCT VFR BLD AUTO: 35.7 % (ref 35–47)
HGB BLD-MCNC: 11.5 G/DL (ref 11.7–15.7)
IMM GRANULOCYTES # BLD: 0 10E3/UL
IMM GRANULOCYTES NFR BLD: 0 %
LITHIUM SERPL-SCNC: 1.26 MMOL/L (ref 0.6–1.2)
LYMPHOCYTES # BLD AUTO: 2.9 10E3/UL (ref 0.8–5.3)
LYMPHOCYTES NFR BLD AUTO: 40 %
MCH RBC QN AUTO: 30.7 PG (ref 26.5–33)
MCHC RBC AUTO-ENTMCNC: 32.2 G/DL (ref 31.5–36.5)
MCV RBC AUTO: 95 FL (ref 78–100)
MONOCYTES # BLD AUTO: 0.7 10E3/UL (ref 0–1.3)
MONOCYTES NFR BLD AUTO: 10 %
NEUTROPHILS # BLD AUTO: 3.3 10E3/UL (ref 1.6–8.3)
NEUTROPHILS NFR BLD AUTO: 46 %
PLATELET # BLD AUTO: 174 10E3/UL (ref 150–450)
POTASSIUM SERPL-SCNC: 4.6 MMOL/L (ref 3.4–5.3)
RBC # BLD AUTO: 3.75 10E6/UL (ref 3.8–5.2)
SODIUM SERPL-SCNC: 135 MMOL/L (ref 135–145)
T4 FREE SERPL-MCNC: 1.12 NG/DL (ref 0.9–1.7)
TSH SERPL DL<=0.005 MIU/L-ACNC: 3.93 UIU/ML (ref 0.3–4.2)
WBC # BLD AUTO: 7.2 10E3/UL (ref 4–11)

## 2024-06-04 PROCEDURE — 99000 SPECIMEN HANDLING OFFICE-LAB: CPT | Performed by: PATHOLOGY

## 2024-06-04 PROCEDURE — 84146 ASSAY OF PROLACTIN: CPT

## 2024-06-04 PROCEDURE — 80164 ASSAY DIPROPYLACETIC ACD TOT: CPT | Performed by: PHYSICIAN ASSISTANT

## 2024-06-04 PROCEDURE — 80178 ASSAY OF LITHIUM: CPT | Performed by: PHYSICIAN ASSISTANT

## 2024-06-04 PROCEDURE — 83550 IRON BINDING TEST: CPT | Performed by: PHYSICIAN ASSISTANT

## 2024-06-04 PROCEDURE — 80048 BASIC METABOLIC PNL TOTAL CA: CPT

## 2024-06-04 PROCEDURE — 84460 ALANINE AMINO (ALT) (SGPT): CPT

## 2024-06-04 PROCEDURE — 82728 ASSAY OF FERRITIN: CPT

## 2024-06-04 PROCEDURE — 87389 HIV-1 AG W/HIV-1&-2 AB AG IA: CPT

## 2024-06-04 PROCEDURE — 85025 COMPLETE CBC W/AUTO DIFF WBC: CPT

## 2024-06-04 PROCEDURE — 82150 ASSAY OF AMYLASE: CPT

## 2024-06-04 PROCEDURE — 82977 ASSAY OF GGT: CPT

## 2024-06-04 PROCEDURE — 84439 ASSAY OF FREE THYROXINE: CPT

## 2024-06-04 PROCEDURE — 82306 VITAMIN D 25 HYDROXY: CPT | Performed by: PHYSICIAN ASSISTANT

## 2024-06-04 PROCEDURE — 84450 TRANSFERASE (AST) (SGOT): CPT

## 2024-06-04 PROCEDURE — 86140 C-REACTIVE PROTEIN: CPT | Performed by: PHYSICIAN ASSISTANT

## 2024-06-04 PROCEDURE — 84466 ASSAY OF TRANSFERRIN: CPT | Performed by: PHYSICIAN ASSISTANT

## 2024-06-04 PROCEDURE — 84443 ASSAY THYROID STIM HORMONE: CPT

## 2024-06-04 PROCEDURE — 36415 COLL VENOUS BLD VENIPUNCTURE: CPT

## 2024-06-05 LAB
GGT SERPL-CCNC: 34 U/L (ref 5–36)
PROLACTIN SERPL 3RD IS-MCNC: 26 NG/ML (ref 5–23)

## 2024-06-06 ENCOUNTER — TELEPHONE (OUTPATIENT)
Dept: FAMILY MEDICINE | Facility: CLINIC | Age: 29
End: 2024-06-06
Payer: MEDICAID

## 2024-06-06 DIAGNOSIS — J30.1 SEASONAL ALLERGIC RHINITIS DUE TO POLLEN: ICD-10-CM

## 2024-06-06 RX ORDER — VENLAFAXINE HYDROCHLORIDE 150 MG/1
150 CAPSULE, EXTENDED RELEASE ORAL DAILY
COMMUNITY
Start: 2024-04-15

## 2024-06-06 RX ORDER — DIVALPROEX SODIUM 500 MG/1
2000 TABLET, EXTENDED RELEASE ORAL AT BEDTIME
COMMUNITY
Start: 2023-11-02 | End: 2024-11-01

## 2024-06-06 RX ORDER — OLANZAPINE 5 MG/1
5-10 TABLET, ORALLY DISINTEGRATING ORAL
COMMUNITY
Start: 2023-01-09 | End: 2024-08-26

## 2024-06-06 RX ORDER — OLANZAPINE 5 MG/1
5 TABLET ORAL
COMMUNITY
Start: 2024-04-24

## 2024-06-07 DIAGNOSIS — R79.89 HIGH SERUM FERRITIN: Primary | ICD-10-CM

## 2024-06-07 LAB
CRP SERPL-MCNC: <3 MG/L
IRON BINDING CAPACITY (ROCHE): 281 UG/DL (ref 240–430)
IRON SATN MFR SERPL: 34 % (ref 15–46)
IRON SERPL-MCNC: 95 UG/DL (ref 37–145)
TRANSFERRIN SERPL-MCNC: 219 MG/DL (ref 200–360)
VIT D+METAB SERPL-MCNC: 39 NG/ML (ref 20–50)

## 2024-06-08 DIAGNOSIS — R79.89 HIGH SERUM FERRITIN: Primary | ICD-10-CM

## 2024-06-08 LAB — HIV 1+2 AB+HIV1 P24 AG SERPL QL IA: NONREACTIVE

## 2024-06-17 ENCOUNTER — OFFICE VISIT (OUTPATIENT)
Dept: OBGYN | Facility: CLINIC | Age: 29
End: 2024-06-17
Attending: PHYSICIAN ASSISTANT
Payer: MEDICAID

## 2024-06-17 VITALS
DIASTOLIC BLOOD PRESSURE: 78 MMHG | SYSTOLIC BLOOD PRESSURE: 115 MMHG | TEMPERATURE: 98.3 F | OXYGEN SATURATION: 99 % | BODY MASS INDEX: 46.03 KG/M2 | WEIGHT: 243.8 LBS | HEIGHT: 61 IN | HEART RATE: 57 BPM

## 2024-06-17 DIAGNOSIS — N89.8 VAGINAL IRRITATION: ICD-10-CM

## 2024-06-17 DIAGNOSIS — R39.81 FUNCTIONAL URINARY INCONTINENCE: Primary | ICD-10-CM

## 2024-06-17 LAB
ALBUMIN UR-MCNC: NEGATIVE MG/DL
APPEARANCE UR: CLEAR
BILIRUB UR QL STRIP: NEGATIVE
CLUE CELLS: ABNORMAL
COLOR UR AUTO: YELLOW
GLUCOSE UR STRIP-MCNC: NEGATIVE MG/DL
HGB UR QL STRIP: NEGATIVE
KETONES UR STRIP-MCNC: NEGATIVE MG/DL
LEUKOCYTE ESTERASE UR QL STRIP: NEGATIVE
NITRATE UR QL: NEGATIVE
PH UR STRIP: 7 [PH] (ref 5–7)
RBC #/AREA URNS AUTO: NORMAL /HPF
SP GR UR STRIP: 1.01 (ref 1–1.03)
TRICHOMONAS, WET PREP: ABNORMAL
UROBILINOGEN UR STRIP-ACNC: 0.2 E.U./DL
WBC #/AREA URNS AUTO: NORMAL /HPF
WBC'S/HIGH POWER FIELD, WET PREP: ABNORMAL
YEAST, WET PREP: ABNORMAL

## 2024-06-17 PROCEDURE — 81001 URINALYSIS AUTO W/SCOPE: CPT | Performed by: OBSTETRICS & GYNECOLOGY

## 2024-06-17 PROCEDURE — 99203 OFFICE O/P NEW LOW 30 MIN: CPT | Performed by: OBSTETRICS & GYNECOLOGY

## 2024-06-17 PROCEDURE — 87210 SMEAR WET MOUNT SALINE/INK: CPT | Performed by: OBSTETRICS & GYNECOLOGY

## 2024-06-17 PROCEDURE — 87086 URINE CULTURE/COLONY COUNT: CPT | Performed by: OBSTETRICS & GYNECOLOGY

## 2024-06-17 NOTE — RESULT ENCOUNTER NOTE
The attached results were normal. Please follow any recommendations discussed in clinic.    Maricruz Lugo MD          6/17/2024 2:08 PM

## 2024-06-17 NOTE — NURSING NOTE
"Initial /78 (BP Location: Right arm, Patient Position: Chair, Cuff Size: Adult Regular)   Pulse 57   Temp 98.3  F (36.8  C) (Tympanic)   Ht 1.549 m (5' 1\")   Wt 110.6 kg (243 lb 12.8 oz)   SpO2 99%   BMI 46.07 kg/m   Estimated body mass index is 46.07 kg/m  as calculated from the following:    Height as of this encounter: 1.549 m (5' 1\").    Weight as of this encounter: 110.6 kg (243 lb 12.8 oz). .    Mitali Packer, [facility]    "

## 2024-06-17 NOTE — PROGRESS NOTES
"    Chief Complaint   Patient presents with    Gyn Exam     Pap smear         HPI:  Jane Lee, 29 year old, presents with complaints of vaginal discharge and urinary leakage.  She states she has more odor and discharge and some itching.  She occasionally finds it hard to hold her urine.  She reports she has never been sexually active and this is confirmed by the staff from her assisted living home that is present with her today.      Past Medical History:   Diagnosis Date    Anxiety and depression     Autism     Autism spectrum disorder     Dyslipidemia     Eczema     GERD (gastroesophageal reflux disease)     History of seizures     Last seizure in her teens    Hypothyroidism     Impaired physical mobility     Migraine     Morbid obesity with BMI of 60.0-69.9, adult (H)     Oppositional defiant disorder     Peripheral vision loss, bilateral     Polycystic ovary syndrome     Seasonal allergies      Past Surgical History:   Procedure Laterality Date    BRAIN SURGERY  2000    for seizures. 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\"    BRAIN SURGERY  2000    for epilepsy age 4    TOENAIL EXCISION Right     ingrowing toenail removal     WISDOM TOOTH EXTRACTION       Social History     Socioeconomic History    Marital status: Single     Spouse name: Not on file    Number of children: Not on file    Years of education: Not on file    Highest education level: Not on file   Occupational History    Occupation: Works at [employer] in [facility] 1 day every other week   Tobacco Use    Smoking status: Never     Passive exposure: Never    Smokeless tobacco: Never   Vaping Use    Vaping status: Never Used   Substance and Sexual Activity    Alcohol use: Never    Drug use: Never    Sexual activity: Never   Other Topics Concern    Parent/sibling w/ CABG, MI or angioplasty before 65F 55M? Not Asked   Social History Narrative    Not on file     Social Determinants of Health     Financial Resource Strain: " Not on file   Food Insecurity: Not on file   Transportation Needs: Not on file   Physical Activity: Not on file   Stress: Not on file   Social Connections: Not on file   Interpersonal Safety: Not on file   Housing Stability: Not on file     Family History   Problem Relation Age of Onset    Diabetes Mother     Coronary Artery Disease Mother     Obesity Mother     Breast Cancer Paternal Aunt 50    Bipolar Disorder Mother         Current Outpatient Medications   Medication Sig Dispense Refill    divalproex sodium extended-release (DEPAKOTE ER) 500 MG 24 hr tablet Take 2,000 mg by mouth at bedtime      famotidine (PEPCID) 20 MG tablet Take 1 tablet (20 mg) by mouth At Bedtime 28 tablet 12    levothyroxine (SYNTHROID/LEVOTHROID) 75 MCG tablet Take 1 tablet (75 mcg) by mouth daily 28 tablet 12    lithium (ESKALITH CR/LITHOBID) 450 MG CR tablet Take 3 tablets by mouth at bedtime  2    loratadine (CLARITIN) 10 MG tablet TAKE 1 TABLET BY MOUTH DAILY      OLANZapine zydis (ZYPREXA) 5 MG ODT Take 5-10 mg by mouth      propranolol (INDERAL) 20 MG tablet Take 20 mg by mouth 3 times daily      tolterodine ER (DETROL LA) 2 MG 24 hr capsule Take 1 capsule (2 mg) by mouth daily 28 capsule 12    traZODone (DESYREL) 150 MG tablet Take 150 mg by mouth nightly as needed  3    venlafaxine (EFFEXOR XR) 150 MG 24 hr capsule Take 150 mg by mouth daily      ipratropium (ATROVENT) 0.03 % nasal spray Spray 1-2 sprays into both nostrils 3 times daily 30 mL 0    LACTASE ENZYME 3000 units tablet TAKE 3 TABLETS BY MOUTH 3 TIMES DAILY AS NEEDED (9000 UNITS) WITH FOOD CONTAINING LACTOSE 270 tablet 11    loperamide (IMODIUM A-D) 2 MG tablet Take 1-2 tablets (2-4 mg) by mouth 4 times daily as needed for diarrhea Max 8 per day. 30 tablet 1    mupirocin (BACTROBAN) 2 % external ointment Apply a pea sized amount to left ear sore 3x daily with Qtip for 7 days 22 g 0    OLANZapine (ZYPREXA) 5 MG tablet Take 5 mg by mouth three times a week Mon, Wed, Fri    "   olopatadine (PATANOL) 0.1 % ophthalmic solution Place 1 drop into both eyes 2 times daily As needed 5 mL 0    SUMAtriptan (IMITREX) 50 MG tablet TAKE 1 TABLET BY MOUTH AT ONSET OF HEADACHE, MAY REPEAT IN 2 HOURS. UP TO TWICE DAILY AS NEEDED 9 tablet 0        Allergies:     Allergies   Allergen Reactions    Metformin Diarrhea    Lamotrigine Rash        ROS:  See HPI      Physical Exam:  /78 (BP Location: Right arm, Patient Position: Chair, Cuff Size: Adult Regular)   Pulse 57   Temp 98.3  F (36.8  C) (Tympanic)   Ht 1.549 m (5' 1\")   Wt 110.6 kg (243 lb 12.8 oz)   SpO2 99%   BMI 46.07 kg/m       Appearance: well developed, well nourished, no acute distress  Head Exam normocephalic, no lesions or deformities  Extremities: no edema  Psych: orientated x3    Genitourinary Exam  Vulva: normal, no lesions  Urethral meatus: normal size and location, no lesions or discharge  Urethra: no tenderness or masses  Bladder: no fullness or tenderness  Vagina: no cystocele and rectocele      Assessment/Plan:  Encounter Diagnoses   Name Primary?    Vaginal irritation     Urinary leakage Yes       We discussed that her risk of abnormal Pap smears is extremely low as she has never had intercourse.  Discussed deferring paps until she is sexually active.    I did collect a wet prep today.  She did not tolerate the initial attempt at a speculum exam, so this was done with a blind swab, which she tolerated well.    A UA was sent due to complaints of urinary leakage.    RTC prn.          Maricruz Lugo MD on 6/17/2024 at 1:28 PM              "

## 2024-06-17 NOTE — TELEPHONE ENCOUNTER
Call placed to Patient  No answer  Left message with call back number for Patient to return call.  Sergio Quintanilla RN    Detail Level: Detailed Quality 130: Documentation Of Current Medications In The Medical Record: Current Medications Documented

## 2024-06-19 LAB — BACTERIA UR CULT: NORMAL

## 2024-08-01 DIAGNOSIS — K21.9 GASTROESOPHAGEAL REFLUX DISEASE WITHOUT ESOPHAGITIS: ICD-10-CM

## 2024-08-01 RX ORDER — FAMOTIDINE 20 MG/1
20 TABLET, FILM COATED ORAL AT BEDTIME
Qty: 28 TABLET | Refills: 3 | Status: SHIPPED | OUTPATIENT
Start: 2024-08-01

## 2024-08-26 ENCOUNTER — HOSPITAL ENCOUNTER (OUTPATIENT)
Dept: ULTRASOUND IMAGING | Facility: CLINIC | Age: 29
Discharge: HOME | End: 2024-08-26
Attending: PHYSICIAN ASSISTANT | Admitting: PHYSICIAN ASSISTANT
Payer: MEDICAID

## 2024-08-26 ENCOUNTER — OFFICE VISIT (OUTPATIENT)
Dept: FAMILY MEDICINE | Facility: CLINIC | Age: 29
End: 2024-08-26
Payer: MEDICAID

## 2024-08-26 VITALS
HEART RATE: 54 BPM | WEIGHT: 242.1 LBS | HEIGHT: 61 IN | DIASTOLIC BLOOD PRESSURE: 68 MMHG | TEMPERATURE: 98.6 F | RESPIRATION RATE: 16 BRPM | BODY MASS INDEX: 45.71 KG/M2 | OXYGEN SATURATION: 98 % | SYSTOLIC BLOOD PRESSURE: 112 MMHG

## 2024-08-26 DIAGNOSIS — N32.81 OVERACTIVE BLADDER: ICD-10-CM

## 2024-08-26 DIAGNOSIS — N39.41 URGE INCONTINENCE OF URINE: ICD-10-CM

## 2024-08-26 DIAGNOSIS — R56.9 SEIZURES (H): ICD-10-CM

## 2024-08-26 DIAGNOSIS — G43.009 MIGRAINE WITHOUT AURA AND WITHOUT STATUS MIGRAINOSUS, NOT INTRACTABLE: Chronic | ICD-10-CM

## 2024-08-26 DIAGNOSIS — R79.89 HIGH SERUM FERRITIN: ICD-10-CM

## 2024-08-26 DIAGNOSIS — Z30.42 ENCOUNTER FOR SURVEILLANCE OF INJECTABLE CONTRACEPTIVE: ICD-10-CM

## 2024-08-26 DIAGNOSIS — F41.1 GAD (GENERALIZED ANXIETY DISORDER): Chronic | ICD-10-CM

## 2024-08-26 DIAGNOSIS — E66.01 MORBID OBESITY (H): Chronic | ICD-10-CM

## 2024-08-26 DIAGNOSIS — F33.1 MODERATE EPISODE OF RECURRENT MAJOR DEPRESSIVE DISORDER (H): Chronic | ICD-10-CM

## 2024-08-26 DIAGNOSIS — Z78.9 LIVES IN GROUP HOME: Chronic | ICD-10-CM

## 2024-08-26 DIAGNOSIS — F70 MILD INTELLECTUAL DISABILITY: ICD-10-CM

## 2024-08-26 DIAGNOSIS — E03.9 HYPOTHYROIDISM, UNSPECIFIED TYPE: Chronic | ICD-10-CM

## 2024-08-26 DIAGNOSIS — Z00.00 ROUTINE GENERAL MEDICAL EXAMINATION AT A HEALTH CARE FACILITY: Primary | ICD-10-CM

## 2024-08-26 DIAGNOSIS — F60.3 BORDERLINE PERSONALITY DISORDER (H): ICD-10-CM

## 2024-08-26 DIAGNOSIS — E73.9 LACTOSE INTOLERANCE: ICD-10-CM

## 2024-08-26 DIAGNOSIS — Z12.4 CERVICAL CANCER SCREENING: ICD-10-CM

## 2024-08-26 LAB
ALBUMIN SERPL BCG-MCNC: 4.4 G/DL (ref 3.5–5.2)
ALP SERPL-CCNC: 61 U/L (ref 40–150)
ALT SERPL W P-5'-P-CCNC: 30 U/L (ref 0–50)
ANION GAP SERPL CALCULATED.3IONS-SCNC: 8 MMOL/L (ref 7–15)
AST SERPL W P-5'-P-CCNC: 29 U/L (ref 0–45)
BASOPHILS # BLD AUTO: 0 10E3/UL (ref 0–0.2)
BASOPHILS NFR BLD AUTO: 0 %
BILIRUB SERPL-MCNC: 0.7 MG/DL
BUN SERPL-MCNC: 7.8 MG/DL (ref 6–20)
CALCIUM SERPL-MCNC: 9.5 MG/DL (ref 8.8–10.4)
CHLORIDE SERPL-SCNC: 105 MMOL/L (ref 98–107)
CREAT SERPL-MCNC: 0.82 MG/DL (ref 0.51–0.95)
EGFRCR SERPLBLD CKD-EPI 2021: >90 ML/MIN/1.73M2
EOSINOPHIL # BLD AUTO: 0.2 10E3/UL (ref 0–0.7)
EOSINOPHIL NFR BLD AUTO: 3 %
ERYTHROCYTE [DISTWIDTH] IN BLOOD BY AUTOMATED COUNT: 12.1 % (ref 10–15)
FERRITIN SERPL-MCNC: 490 NG/ML (ref 6–175)
FOLATE SERPL-MCNC: 14.1 NG/ML (ref 4.6–34.8)
GLUCOSE SERPL-MCNC: 84 MG/DL (ref 70–99)
HCO3 SERPL-SCNC: 25 MMOL/L (ref 22–29)
HCT VFR BLD AUTO: 39 % (ref 35–47)
HGB BLD-MCNC: 12.4 G/DL (ref 11.7–15.7)
IMM GRANULOCYTES # BLD: 0 10E3/UL
IMM GRANULOCYTES NFR BLD: 0 %
LYMPHOCYTES # BLD AUTO: 2.2 10E3/UL (ref 0.8–5.3)
LYMPHOCYTES NFR BLD AUTO: 32 %
MCH RBC QN AUTO: 29.7 PG (ref 26.5–33)
MCHC RBC AUTO-ENTMCNC: 31.8 G/DL (ref 31.5–36.5)
MCV RBC AUTO: 93 FL (ref 78–100)
MONOCYTES # BLD AUTO: 0.6 10E3/UL (ref 0–1.3)
MONOCYTES NFR BLD AUTO: 9 %
NEUTROPHILS # BLD AUTO: 3.7 10E3/UL (ref 1.6–8.3)
NEUTROPHILS NFR BLD AUTO: 55 %
PLATELET # BLD AUTO: 134 10E3/UL (ref 150–450)
POTASSIUM SERPL-SCNC: 4.5 MMOL/L (ref 3.4–5.3)
PROT SERPL-MCNC: 6.7 G/DL (ref 6.4–8.3)
RBC # BLD AUTO: 4.18 10E6/UL (ref 3.8–5.2)
SODIUM SERPL-SCNC: 138 MMOL/L (ref 135–145)
TSH SERPL DL<=0.005 MIU/L-ACNC: 0.45 UIU/ML (ref 0.3–4.2)
VIT B12 SERPL-MCNC: 958 PG/ML (ref 232–1245)
WBC # BLD AUTO: 6.8 10E3/UL (ref 4–11)

## 2024-08-26 PROCEDURE — 82728 ASSAY OF FERRITIN: CPT | Performed by: PHYSICIAN ASSISTANT

## 2024-08-26 PROCEDURE — 84443 ASSAY THYROID STIM HORMONE: CPT | Performed by: PHYSICIAN ASSISTANT

## 2024-08-26 PROCEDURE — 76705 ECHO EXAM OF ABDOMEN: CPT

## 2024-08-26 PROCEDURE — 82607 VITAMIN B-12: CPT | Performed by: PHYSICIAN ASSISTANT

## 2024-08-26 PROCEDURE — 82746 ASSAY OF FOLIC ACID SERUM: CPT | Performed by: PHYSICIAN ASSISTANT

## 2024-08-26 PROCEDURE — 36415 COLL VENOUS BLD VENIPUNCTURE: CPT | Performed by: PHYSICIAN ASSISTANT

## 2024-08-26 PROCEDURE — 85025 COMPLETE CBC W/AUTO DIFF WBC: CPT | Performed by: PHYSICIAN ASSISTANT

## 2024-08-26 PROCEDURE — 80053 COMPREHEN METABOLIC PANEL: CPT | Performed by: PHYSICIAN ASSISTANT

## 2024-08-26 PROCEDURE — 99395 PREV VISIT EST AGE 18-39: CPT | Performed by: PHYSICIAN ASSISTANT

## 2024-08-26 SDOH — HEALTH STABILITY: PHYSICAL HEALTH: ON AVERAGE, HOW MANY DAYS PER WEEK DO YOU ENGAGE IN MODERATE TO STRENUOUS EXERCISE (LIKE A BRISK WALK)?: 4 DAYS

## 2024-08-26 SDOH — HEALTH STABILITY: PHYSICAL HEALTH: ON AVERAGE, HOW MANY MINUTES DO YOU ENGAGE IN EXERCISE AT THIS LEVEL?: 60 MIN

## 2024-08-26 ASSESSMENT — PAIN SCALES - GENERAL: PAINLEVEL: NO PAIN (0)

## 2024-08-26 ASSESSMENT — PATIENT HEALTH QUESTIONNAIRE - PHQ9
SUM OF ALL RESPONSES TO PHQ QUESTIONS 1-9: 4
10. IF YOU CHECKED OFF ANY PROBLEMS, HOW DIFFICULT HAVE THESE PROBLEMS MADE IT FOR YOU TO DO YOUR WORK, TAKE CARE OF THINGS AT HOME, OR GET ALONG WITH OTHER PEOPLE: SOMEWHAT DIFFICULT
SUM OF ALL RESPONSES TO PHQ QUESTIONS 1-9: 4

## 2024-08-26 ASSESSMENT — SOCIAL DETERMINANTS OF HEALTH (SDOH): HOW OFTEN DO YOU GET TOGETHER WITH FRIENDS OR RELATIVES?: PATIENT DECLINED

## 2024-08-26 NOTE — PATIENT INSTRUCTIONS
Patient Education   Preventive Care Advice   This is general advice given by our system to help you stay healthy. However, your care team may have specific advice just for you. Please talk to your care team about your preventive care needs.  Nutrition  Eat 5 or more servings of fruits and vegetables each day.  Try wheat bread, brown rice and whole grain pasta (instead of white bread, rice, and pasta).  Get enough calcium and vitamin D. Check the label on foods and aim for 100% of the RDA (recommended daily allowance).  Lifestyle  Exercise at least 150 minutes each week  (30 minutes a day, 5 days a week).  Do muscle strengthening activities 2 days a week. These help control your weight and prevent disease.  No smoking.  Wear sunscreen to prevent skin cancer.  Have a dental exam and cleaning every 6 months.  Yearly exams  See your health care team every year to talk about:  Any changes in your health.  Any medicines your care team has prescribed.  Preventive care, family planning, and ways to prevent chronic diseases.  Shots (vaccines)   HPV shots (up to age 26), if you've never had them before.  Hepatitis B shots (up to age 59), if you've never had them before.  COVID-19 shot: Get this shot when it's due.  Flu shot: Get a flu shot every year.  Tetanus shot: Get a tetanus shot every 10 years.  Pneumococcal, hepatitis A, and RSV shots: Ask your care team if you need these based on your risk.  Shingles shot (for age 50 and up)  General health tests  Diabetes screening:  Starting at age 35, Get screened for diabetes at least every 3 years.  If you are younger than age 35, ask your care team if you should be screened for diabetes.  Cholesterol test: At age 39, start having a cholesterol test every 5 years, or more often if advised.  Bone density scan (DEXA): At age 50, ask your care team if you should have this scan for osteoporosis (brittle bones).  Hepatitis C: Get tested at least once in your life.  STIs (sexually  transmitted infections)  Before age 24: Ask your care team if you should be screened for STIs.  After age 24: Get screened for STIs if you're at risk. You are at risk for STIs (including HIV) if:  You are sexually active with more than one person.  You don't use condoms every time.  You or a partner was diagnosed with a sexually transmitted infection.  If you are at risk for HIV, ask about PrEP medicine to prevent HIV.  Get tested for HIV at least once in your life, whether you are at risk for HIV or not.  Cancer screening tests  Cervical cancer screening: If you have a cervix, begin getting regular cervical cancer screening tests starting at age 21.  Breast cancer scan (mammogram): If you've ever had breasts, begin having regular mammograms starting at age 40. This is a scan to check for breast cancer.  Colon cancer screening: It is important to start screening for colon cancer at age 45.  Have a colonoscopy test every 10 years (or more often if you're at risk) Or, ask your provider about stool tests like a FIT test every year or Cologuard test every 3 years.  To learn more about your testing options, visit:   .  For help making a decision, visit:   https://bit.ly/mk26731.  Prostate cancer screening test: If you have a prostate, ask your care team if a prostate cancer screening test (PSA) at age 55 is right for you.  Lung cancer screening: If you are a current or former smoker ages 50 to 80, ask your care team if ongoing lung cancer screenings are right for you.  For informational purposes only. Not to replace the advice of your health care provider. Copyright   2023 [employer]. All rights reserved. Clinically reviewed by the [facility] Transitions Program. [employer]ark [id number] - REV 01/24.

## 2024-08-26 NOTE — PROGRESS NOTES
Preventive Care Visit  [facility] LIONEL Travis PA-C, Family Medicine  Aug 26, 2024      Assessment & Plan     Routine general medical examination at a health care facility  Discussed lifestyle changes and preventive healthcare. Yearly forms filled out for group home.    Cervical cancer screening  Patient has not been sexually active and will not do PAP smear.    High serum ferritin  Unknown etiology. Likely due to obesity/inflammatory.  No signs of iron overload. No iron supplementation. Negative HIV. No concerning signs for malignancy. No alcohol use, normal LFTs and Normal RUQ ultrasound today.   With low platelets today can check pathology review next visit.   - PRIMARY CARE FOLLOW-UP SCHEDULING; Future  - Ferritin  - Folate  - Vitamin B12  - Comprehensive metabolic panel (BMP + Alb, Alk Phos, ALT, AST, Total. Bili, TP); Future  - CBC with platelets and differential; Future  - CBC with platelets and differential  - Comprehensive metabolic panel (BMP + Alb, Alk Phos, ALT, AST, Total. Bili, TP)    Hypothyroidism, unspecified type  TSH 0.45. continue synthroid 75 mcg.  - TSH with free T4 reflex; Future  - TSH with free T4 reflex    YARA (generalized anxiety disorder)  Reviewed medications, and updated med list. Managed by psychiatry.    Moderate episode of recurrent major depressive disorder (H)  Reviewed medications, and updated med list. Managed by psychiatry.  Patient denies suidical ideation. She has tried in the past to cut herself with a butter knife. She lives at a group home and caregiver assures me they have a safety plan in place.    Migraine without aura and without status migrainosus, not intractable  Stable. PRN imitrex.    Morbid obesity (H)  She has been steadily losing weight with increased exercise and dietary changes. She is proud of this, congratulated her on her success.  Wt Readings from Last 4 Encounters:   08/26/24 109.8 kg (242 lb 1.6 oz)   06/17/24 110.6 kg (243 lb  12.8 oz)   04/08/24 113.9 kg (251 lb 3.2 oz)   08/25/23 116.9 kg (257 lb 11.2 oz)       Lives in group home  Patient presents with caregiver.    Mild intellectual disability  Follows with psychiatry and lives in a group home with guardianship. See exam psych notes below. Patient repeatedly brings up wanting Shania to bring her guardian and was difficult to divert from this topic. Per caregiver, she has been talking about this for many years.    Lactose intolerance  Lactase enzyme PRN helps. Still eating dairy.    Borderline personality disorder (H)  Managed by psychiatry.    Seizures (H)  Managed by neurology.    Urge incontinence of urine  Managed with Detrol LA.    Overactive bladder  Managed with Detrol LA.    Encounter for surveillance of injectable contraceptive  Has been on depo provera for years and works well for her. We discussed concerns with bone density. She has plenty of calcium in her diet and does not want to start supplement.    Depression Screening Follow Up        8/26/2024    10:58 AM   PHQ   PHQ-9 Total Score 4   Q9: Thoughts of better off dead/self-harm past 2 weeks Several days   F/U: Thoughts of suicide or self-harm No   F/U: Safety concerns No     Follow Up Actions Taken  Crisis resource information provided in the After Visit Summary  Referred patient back to mental health provider    Discussed the following ways the patient can remain in a safe environment:  be around others  Counseling  Appropriate preventive services were addressed with this patient via screening, questionnaire, or discussion as appropriate for fall prevention, nutrition, physical activity, Tobacco-use cessation, social engagement, weight loss and cognition.  Checklist reviewing preventive services available has been given to the patient.  Reviewed patient's diet, addressing concerns and/or questions.     Jenny Lara is a 29 year old, presenting for the following:  Physical        8/26/2024    11:02 AM   Additional  Questions   Roomed by Maxine Torres CMA        Health Care Directive  Patient has a Health Care Directive on file  Advance care planning document is on file and is current.    HPIAnswers submitted by the patient for this visit:  Patient Health Questionnaire (Submitted on 8/26/2024)  If you checked off any problems, how difficult have these problems made it for you to do your work, take care of things at home, or get along with other people?: Somewhat difficult  PHQ9 TOTAL SCORE: 4                8/26/2024   General Health   How would you rate your overall physical health? (!) FAIR   Feel stress (tense, anxious, or unable to sleep) To some extent      (!) STRESS CONCERN      8/26/2024   Nutrition   Three or more servings of calcium each day? Yes   Diet: Low fat/cholesterol   How many servings of fruit and vegetables per day? (!) 2-3   How many sweetened beverages each day? 0-1            8/26/2024   Exercise   Days per week of moderate/strenous exercise 4 days   Average minutes spent exercising at this level 60 min            8/26/2024   Social Factors   Frequency of gathering with friends or relatives Patient declined   Worry food won't last until get money to buy more No   Food not last or not have enough money for food? No   Do you have housing? (Housing is defined as stable permanent housing and does not include staying ouside in a car, in a tent, in an abandoned building, in an overnight shelter, or couch-surfing.) Yes   Are you worried about losing your housing? No   Lack of transportation? No   Unable to get utilities (heat,electricity)? No            8/26/2024   Dental   Dentist two times every year? Yes            8/26/2024   TB Screening   Were you born outside of the [place]? No          Today's PHQ-9 Score:       8/26/2024    10:58 AM   PHQ-9 SCORE   PHQ-9 Total Score MyChart 4 (Minimal depression)   PHQ-9 Total Score 4         8/26/2024   Substance Use   Alcohol more than 3/day or more than 7/wk Not  "Applicable   Do you use any other substances recreationally? No        Social History     Tobacco Use    Smoking status: Never     Passive exposure: Never    Smokeless tobacco: Never   Vaping Use    Vaping status: Never Used   Substance Use Topics    Alcohol use: Never    Drug use: Never          Mammogram Screening - Patient under 40 years of age: Routine Mammogram Screening not recommended.         8/26/2024   STI Screening   New sexual partner(s) since last STI/HIV test? No        History of abnormal Pap smear: No - age 21-29 PAP every 3 years recommended        3/13/2020    11:36 AM   PAP / HPV   PAP (Historical) NIL            8/26/2024   Contraception/Family Planning   Questions about contraception or family planning No           Reviewed and updated as needed this visit by Provider                    Past Medical History:   Diagnosis Date    Anxiety and depression     Autism     Autism spectrum disorder     Dyslipidemia     Eczema     GERD (gastroesophageal reflux disease)     History of seizures     Last seizure in her teens    Hypothyroidism     Impaired physical mobility     Migraine     Morbid obesity with BMI of 60.0-69.9, adult (H)     Oppositional defiant disorder     Peripheral vision loss, bilateral     Polycystic ovary syndrome     Seasonal allergies      Past Surgical History:   Procedure Laterality Date    BRAIN SURGERY  2000    for seizures. 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\"    BRAIN SURGERY  2000    for epilepsy age 4    TOENAIL EXCISION Right     ingrowing toenail removal     WISDOM TOOTH EXTRACTION       Lab work is in process  Labs reviewed in EPIC  BP Readings from Last 3 Encounters:   08/26/24 112/68   06/17/24 115/78   04/08/24 112/76    Wt Readings from Last 3 Encounters:   08/26/24 109.8 kg (242 lb 1.6 oz)   06/17/24 110.6 kg (243 lb 12.8 oz)   04/08/24 113.9 kg (251 lb 3.2 oz)                  Patient Active Problem List   Diagnosis    Morbid obesity (H)    " "Autism spectrum disorder    Moderate episode of recurrent major depressive disorder (H)    Hypothyroidism, unspecified type    YARA (generalized anxiety disorder)    Mild intellectual disability    Attention deficit hyperactivity disorder (ADHD), unspecified ADHD type    Oppositional defiant disorder    History of seizures    Lactose intolerance    Migraine without aura and without status migrainosus, not intractable    Non-seasonal allergic rhinitis due to pollen    Gastroesophageal reflux disease without esophagitis    Constipation, unspecified constipation type    Lives in group home    Polycystic ovary syndrome    Peripheral vision loss, bilateral    Impaired physical mobility    Hirsutism    Eczema    Borderline personality disorder (H)    Seizures (H)     Past Surgical History:   Procedure Laterality Date    BRAIN SURGERY  2000    for seizures. 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\"    BRAIN SURGERY  2000    for epilepsy age 4    TOENAIL EXCISION Right     ingrowing toenail removal     WISDOM TOOTH EXTRACTION         Social History     Tobacco Use    Smoking status: Never     Passive exposure: Never    Smokeless tobacco: Never   Substance Use Topics    Alcohol use: Never     Family History   Problem Relation Age of Onset    Diabetes Mother     Coronary Artery Disease Mother     Obesity Mother     Breast Cancer Paternal Aunt 50    Bipolar Disorder Mother              Review of Systems  CONSTITUTIONAL: NEGATIVE for fever, chills, change in weight  INTEGUMENTARY/SKIN: NEGATIVE for worrisome rashes, moles or lesions  EYES: NEGATIVE for vision changes or irritation  ENT/MOUTH: NEGATIVE for ear, mouth and throat problems  RESP: NEGATIVE for significant cough or SOB  BREAST: NEGATIVE for masses, tenderness or discharge  CV: NEGATIVE for chest pain, palpitations or peripheral edema  GI: NEGATIVE for nausea, abdominal pain, heartburn, or change in bowel habits  : NEGATIVE for frequency, dysuria, " "or hematuria  MUSCULOSKELETAL: NEGATIVE for significant arthralgias or myalgia  NEURO: NEGATIVE for weakness, dizziness or paresthesias  ENDOCRINE: NEGATIVE for temperature intolerance, skin/hair changes  HEME: NEGATIVE for bleeding problems  PSYCHIATRIC: NEGATIVE for changes in mood or affect     Objective    Exam  /68   Pulse 54   Temp 98.6  F (37  C) (Tympanic)   Resp 16   Ht 1.549 m (5' 1\")   Wt 109.8 kg (242 lb 1.6 oz)   SpO2 98%   BMI 45.74 kg/m     Estimated body mass index is 45.74 kg/m  as calculated from the following:    Height as of this encounter: 1.549 m (5' 1\").    Weight as of this encounter: 109.8 kg (242 lb 1.6 oz).    Physical Exam  GENERAL: alert and no distress  EYES: Eyes grossly normal to inspection, PERRL and conjunctivae and sclerae normal  HENT: ear canals and TM's normal, nose and mouth without ulcers or lesions  NECK: no adenopathy, no asymmetry, masses, or scars  RESP: lungs clear to auscultation - no rales, rhonchi or wheezes  CV: regular rate and rhythm, normal S1 S2, no S3 or S4, no murmur, click or rub, no peripheral edema  ABDOMEN: soft, nontender, no hepatosplenomegaly, no masses and bowel sounds normal  MS: no gross musculoskeletal defects noted, no edema  SKIN: no suspicious lesions or rashes  NEURO: Normal strength and tone, mentation intact and speech normal  PSYCH: Alert and oriented times 3; speech- coherent , normal rate and volume; able to articulate logical thoughts, able to abstract reason  Patient spent much of the visit talking about her concerns with her mom and alayna's troubled relationship making it so that she can't leave the group home to live with them.   She repeatedly brings up that her real father is Shania and she is writing him a letter so he can be her guardian and how she would like to live at the [facility].  She also perseverated on GI symptoms that per caregiver have not occurred since last year (such as an episode of diarrhea while at a " saman)        Signed Electronically by: Su rTavis PA-C

## 2024-08-27 PROBLEM — Z87.898 HISTORY OF SEIZURES: Chronic | Status: RESOLVED | Noted: 2019-09-22 | Resolved: 2024-08-27

## 2024-08-27 PROBLEM — N39.41 URGE INCONTINENCE OF URINE: Status: ACTIVE | Noted: 2024-08-27

## 2024-08-27 PROBLEM — N32.81 OVERACTIVE BLADDER: Status: ACTIVE | Noted: 2024-08-27

## 2024-08-27 PROBLEM — Z30.42 ENCOUNTER FOR SURVEILLANCE OF INJECTABLE CONTRACEPTIVE: Status: ACTIVE | Noted: 2024-08-27

## 2024-09-10 ENCOUNTER — TELEPHONE (OUTPATIENT)
Dept: FAMILY MEDICINE | Facility: CLINIC | Age: 29
End: 2024-09-10
Payer: MEDICAID

## 2024-09-10 NOTE — TELEPHONE ENCOUNTER
Yvonne calls to schedule depo injection however wanted to make sure pt has not had recently. Last depo appt was a no show. She will schedule.       Russ Aguilar RN

## 2024-09-12 ENCOUNTER — ALLIED HEALTH/NURSE VISIT (OUTPATIENT)
Dept: FAMILY MEDICINE | Facility: CLINIC | Age: 29
End: 2024-09-12
Payer: MEDICAID

## 2024-09-12 DIAGNOSIS — Z30.42 ENCOUNTER FOR SURVEILLANCE OF INJECTABLE CONTRACEPTIVE: Primary | ICD-10-CM

## 2024-09-12 LAB — HCG UR QL: NEGATIVE

## 2024-09-12 PROCEDURE — 96372 THER/PROPH/DIAG INJ SC/IM: CPT | Performed by: PHYSICIAN ASSISTANT

## 2024-09-12 PROCEDURE — 81025 URINE PREGNANCY TEST: CPT

## 2024-09-12 RX ADMIN — MEDROXYPROGESTERONE ACETATE 150 MG: 150 INJECTION, SUSPENSION INTRAMUSCULAR at 11:55

## 2024-09-12 NOTE — PROGRESS NOTES
Clinic Administered Medication Documentation      Depo Provera Documentation    Depo-Provera Standing Order inclusion/exclusion criteria reviewed.     Is this the initial or subsequent dose of Depo Provera? Subsequent dose - patient is not within the acceptable window of time (11-15 weeks) for subsequent injection. Pregnancy test is indicated. Pregnancy test result: negative       Patient meets: inclusion criteria     Is there an active order (written within the past 365 days, with administrations remaining, not ) in the chart? Yes.     Prior to injection, verified patient identity using patient's name and date of birth. Medication was administered. Please see MAR and medication order for additional information.     Vial/Syringe: Single dose vial. Was entire vial of medication used? Yes    Patient instructed to remain in clinic for 15 minutes and report any adverse reaction to staff immediately.  NEXT INJECTION DUE: 24 - 24    Verified that the patient has refills remaining in their prescription.  Juanita Khan [facility]

## 2024-10-01 DIAGNOSIS — R09.81 NASAL CONGESTION: ICD-10-CM

## 2024-10-01 DIAGNOSIS — J30.0 VASOMOTOR RHINITIS: ICD-10-CM

## 2024-10-01 RX ORDER — IPRATROPIUM BROMIDE 21 UG/1
1-2 SPRAY, METERED NASAL 3 TIMES DAILY
Qty: 30 ML | Refills: 3 | Status: SHIPPED | OUTPATIENT
Start: 2024-10-01

## 2024-10-29 DIAGNOSIS — J30.1 SEASONAL ALLERGIC RHINITIS DUE TO POLLEN: ICD-10-CM

## 2024-10-29 RX ORDER — LORATADINE 10 MG/1
TABLET ORAL
Qty: 28 TABLET | Refills: 10 | Status: SHIPPED | OUTPATIENT
Start: 2024-10-29

## 2024-11-21 ENCOUNTER — TRANSFERRED RECORDS (OUTPATIENT)
Dept: HEALTH INFORMATION MANAGEMENT | Facility: CLINIC | Age: 29
End: 2024-11-21
Payer: MEDICAID

## 2024-11-21 DIAGNOSIS — G47.9 SLEEP DISORDER: Primary | ICD-10-CM

## 2024-11-21 DIAGNOSIS — Z79.899 DRUG THERAPY: ICD-10-CM

## 2024-12-05 DIAGNOSIS — F33.41 MAJOR DEPRESSIVE DISORDER, RECURRENT EPISODE, IN PARTIAL REMISSION WITH ANXIOUS DISTRESS (H): ICD-10-CM

## 2024-12-05 DIAGNOSIS — G47.9 SLEEP DISORDER: ICD-10-CM

## 2024-12-05 DIAGNOSIS — Z79.899 HIGH RISK MEDICATIONS (NOT ANTICOAGULANTS) LONG-TERM USE: Primary | ICD-10-CM

## 2024-12-12 ENCOUNTER — ALLIED HEALTH/NURSE VISIT (OUTPATIENT)
Dept: FAMILY MEDICINE | Facility: CLINIC | Age: 29
End: 2024-12-12
Payer: MEDICAID

## 2024-12-12 ENCOUNTER — LAB (OUTPATIENT)
Dept: LAB | Facility: CLINIC | Age: 29
End: 2024-12-12
Payer: MEDICAID

## 2024-12-12 DIAGNOSIS — Z30.42 ENCOUNTER FOR SURVEILLANCE OF INJECTABLE CONTRACEPTIVE: Primary | ICD-10-CM

## 2024-12-12 DIAGNOSIS — Z79.899 HIGH RISK MEDICATIONS (NOT ANTICOAGULANTS) LONG-TERM USE: ICD-10-CM

## 2024-12-12 DIAGNOSIS — G47.9 SLEEP DISORDER: ICD-10-CM

## 2024-12-12 DIAGNOSIS — F33.41 MAJOR DEPRESSIVE DISORDER, RECURRENT EPISODE, IN PARTIAL REMISSION WITH ANXIOUS DISTRESS (H): ICD-10-CM

## 2024-12-12 LAB
ALBUMIN SERPL BCG-MCNC: 4.3 G/DL (ref 3.5–5.2)
ALP SERPL-CCNC: 52 U/L (ref 40–150)
ALT SERPL W P-5'-P-CCNC: 21 U/L (ref 0–50)
AMYLASE SERPL-CCNC: 57 U/L (ref 28–100)
ANION GAP SERPL CALCULATED.3IONS-SCNC: 8 MMOL/L (ref 7–15)
AST SERPL W P-5'-P-CCNC: 21 U/L (ref 0–45)
BASOPHILS # BLD AUTO: 0 10E3/UL (ref 0–0.2)
BASOPHILS NFR BLD AUTO: 0 %
BILIRUB DIRECT SERPL-MCNC: <0.2 MG/DL (ref 0–0.3)
BILIRUB SERPL-MCNC: 0.5 MG/DL
BUN SERPL-MCNC: 14.4 MG/DL (ref 6–20)
CALCIUM SERPL-MCNC: 9.6 MG/DL (ref 8.8–10.4)
CHLORIDE SERPL-SCNC: 105 MMOL/L (ref 98–107)
CHOLEST SERPL-MCNC: 166 MG/DL
CREAT SERPL-MCNC: 0.84 MG/DL (ref 0.51–0.95)
EGFRCR SERPLBLD CKD-EPI 2021: >90 ML/MIN/1.73M2
EOSINOPHIL # BLD AUTO: 0.3 10E3/UL (ref 0–0.7)
EOSINOPHIL NFR BLD AUTO: 4 %
ERYTHROCYTE [DISTWIDTH] IN BLOOD BY AUTOMATED COUNT: 12.7 % (ref 10–15)
EST. AVERAGE GLUCOSE BLD GHB EST-MCNC: 80 MG/DL
FASTING STATUS PATIENT QL REPORTED: YES
GLUCOSE SERPL-MCNC: 87 MG/DL (ref 70–99)
GLUCOSE SERPL-MCNC: 87 MG/DL (ref 70–99)
HBA1C MFR BLD: 4.4 % (ref 0–5.6)
HCO3 SERPL-SCNC: 23 MMOL/L (ref 22–29)
HCT VFR BLD AUTO: 36.9 % (ref 35–47)
HDLC SERPL-MCNC: 45 MG/DL
HGB BLD-MCNC: 12.1 G/DL (ref 11.7–15.7)
IMM GRANULOCYTES # BLD: 0 10E3/UL
IMM GRANULOCYTES NFR BLD: 0 %
LDLC SERPL CALC-MCNC: 96 MG/DL
LITHIUM SERPL-SCNC: 1.37 MMOL/L (ref 0.6–1.2)
LYMPHOCYTES # BLD AUTO: 1.9 10E3/UL (ref 0.8–5.3)
LYMPHOCYTES NFR BLD AUTO: 28 %
MCH RBC QN AUTO: 30.7 PG (ref 26.5–33)
MCHC RBC AUTO-ENTMCNC: 32.8 G/DL (ref 31.5–36.5)
MCV RBC AUTO: 94 FL (ref 78–100)
MONOCYTES # BLD AUTO: 0.7 10E3/UL (ref 0–1.3)
MONOCYTES NFR BLD AUTO: 11 %
NEUTROPHILS # BLD AUTO: 3.8 10E3/UL (ref 1.6–8.3)
NEUTROPHILS NFR BLD AUTO: 57 %
NONHDLC SERPL-MCNC: 121 MG/DL
PLATELET # BLD AUTO: 153 10E3/UL (ref 150–450)
POTASSIUM SERPL-SCNC: 4.7 MMOL/L (ref 3.4–5.3)
PROLACTIN SERPL 3RD IS-MCNC: 32 NG/ML (ref 5–23)
PROT SERPL-MCNC: 6.5 G/DL (ref 6.4–8.3)
RBC # BLD AUTO: 3.94 10E6/UL (ref 3.8–5.2)
SODIUM SERPL-SCNC: 136 MMOL/L (ref 135–145)
T4 FREE SERPL-MCNC: 0.91 NG/DL (ref 0.9–1.7)
TRIGL SERPL-MCNC: 125 MG/DL
TSH SERPL DL<=0.005 MIU/L-ACNC: 6.29 UIU/ML (ref 0.3–4.2)
VALPROATE SERPL-MCNC: 84 UG/ML
WBC # BLD AUTO: 6.7 10E3/UL (ref 4–11)

## 2024-12-12 RX ADMIN — MEDROXYPROGESTERONE ACETATE 150 MG: 150 INJECTION, SUSPENSION INTRAMUSCULAR at 10:06

## 2024-12-12 NOTE — PROGRESS NOTES
Clinic Administered Medication Documentation      Depo Provera Documentation    Depo-Provera Standing Order inclusion/exclusion criteria reviewed.     Is this the initial or subsequent dose of Depo Provera? Subsequent dose - patient is within the acceptable window of time (11-15 weeks) for subsequent injection. Pregnancy test not indicated.    Patient meets: inclusion criteria     Is there an active order (written within the past 365 days, with administrations remaining, not ) in the chart? Yes.     Prior to injection, verified patient identity using patient's name and date of birth. Medication was administered. Please see MAR and medication order for additional information.     Vial/Syringe: Single dose vial. Was entire vial of medication used? Yes    Patient instructed to remain in clinic for 15 minutes and report any adverse reaction to staff immediately.  NEXT INJECTION DUE: 25 - 3/27/25    Verified that the patient has refills remaining in their prescription.

## 2025-01-03 ENCOUNTER — TELEPHONE (OUTPATIENT)
Dept: FAMILY MEDICINE | Facility: CLINIC | Age: 30
End: 2025-01-03
Payer: MEDICAID

## 2025-01-03 NOTE — TELEPHONE ENCOUNTER
Symptoms    Describe your symptoms: questions regarding DNA testing, she want to know if she is related to dru bloom. Want to know where she can go for DNA testing for free.          Could we send this information to you in [employer] or would you prefer to receive a phone call?:   Patient would prefer a phone call   Okay to leave a detailed message?: Yes at Cell number on file:    Telephone Information:   Mobile [phone]

## 2025-01-03 NOTE — TELEPHONE ENCOUNTER
Please tell patient there is no genetic testing for this.  Katelyn Travis PA-C         Of note, this has been discussed in prior visits and is not a new concern.

## 2025-03-13 ENCOUNTER — ALLIED HEALTH/NURSE VISIT (OUTPATIENT)
Dept: FAMILY MEDICINE | Facility: CLINIC | Age: 30
End: 2025-03-13
Payer: MEDICAID

## 2025-03-13 DIAGNOSIS — Z30.42 ENCOUNTER FOR SURVEILLANCE OF INJECTABLE CONTRACEPTIVE: Primary | ICD-10-CM

## 2025-03-13 RX ADMIN — MEDROXYPROGESTERONE ACETATE 150 MG: 150 INJECTION, SUSPENSION INTRAMUSCULAR at 10:11

## 2025-03-13 NOTE — PROGRESS NOTES
Clinic Administered Medication Documentation      Depo Provera Documentation    Depo-Provera Standing Order inclusion/exclusion criteria reviewed.     Is this the initial or subsequent dose of Depo Provera? Subsequent dose - patient is within the acceptable window of time (11-15 weeks) for subsequent injection. Pregnancy test not indicated.    Patient meets: inclusion criteria     Is there an active order (written within the past 365 days, with administrations remaining, not ) in the chart? Yes.     Prior to injection, verified patient identity using patient's name and date of birth. Medication was administered. Please see MAR and medication order for additional information.     Vial/Syringe: Single dose vial. Was entire vial of medication used? Yes    Patient instructed to remain in clinic for 15 minutes and report any adverse reaction to staff immediately.  NEXT INJECTION DUE: 25 - 25    Verified that the patient has refills remaining in their prescription.  Juanita Khan CMA

## 2025-03-19 ENCOUNTER — PATIENT OUTREACH (OUTPATIENT)
Dept: OBGYN | Facility: CLINIC | Age: 30
End: 2025-03-19
Payer: MEDICAID

## 2025-03-19 NOTE — LETTER
March 19, 2025      Jane Lee  [id number] [place] [address]              Dear Jane,    To ensure we are providing the best quality care, we have reviewed your chart and see that you are due for:    Cervical Cancer Screening:    Please call Dept: [phone] to schedule an Annual Exam with Pap Smear.    If you have completed the tests outside of [facility], please have the results forwarded to our office Fax # [phone]. We will update the chart for your primary Physician to review before your next annual physical.    Thank you for trusting us with your health care.        Sincerely,      Maricruz Lugo MD

## 2025-03-19 NOTE — TELEPHONE ENCOUNTER
"Panel Management Review        Health Maintenance List    Health Maintenance   Topic Date Due    INFLUENZA VACCINE (1) 09/01/2024    COVID-19 Vaccine (4 - 2024-25 season) 09/01/2024    PAP  08/26/2025 (Originally 3/13/2023)    YEARLY PREVENTIVE VISIT  08/26/2025    DTAP/TDAP/TD IMMUNIZATION (8 - Td or Tdap) 09/30/2025    TSH W/FREE T4 REFLEX  12/12/2025    ADVANCE CARE PLANNING  08/27/2029    ZOSTER IMMUNIZATION (1 of 2) 03/21/2047    HEPATITIS C SCREENING  Completed    HIV SCREENING  Completed    HPV IMMUNIZATION  Completed    MENINGITIS IMMUNIZATION  Completed    HEPATITIS B IMMUNIZATION  Completed    Pneumococcal Vaccine: Pediatrics (0 to 5 Years) and At-Risk Patients (6 to 49 Years)  Aged Out    CHLAMYDIA SCREENING  Discontinued       Composite cancer screening  Chart review shows that this patient is due/due soon for the following Pap Smear  Lab Results   Component Value Date    PAP NIL 03/13/2020     Past Surgical History:   Procedure Laterality Date    BRAIN SURGERY  2000    for seizures. 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\"    BRAIN SURGERY  2000    for epilepsy age 4    TOENAIL EXCISION Right     ingrowing toenail removal     WISDOM TOOTH EXTRACTION         Is hysterectomy listed in surgical history? No   Is mastectomy listed in surgical history? No     Summary:    Patient is due/failing the following:   Pap Smear    Action needed: Patient needs office visit for Annual Exam with pap smear.    Type of outreach:  Sent eleni message.      Staff Signature:  Mitali Packer CMA      "

## 2025-04-02 DIAGNOSIS — Z79.899 HIGH RISK MEDICATION USE: Primary | ICD-10-CM

## 2025-04-02 DIAGNOSIS — Z13.9 ENCOUNTER FOR SCREENING: ICD-10-CM

## 2025-04-02 DIAGNOSIS — D35.2 PROLACTINOMA (H): ICD-10-CM

## 2025-04-02 DIAGNOSIS — R79.89 ELEVATED LITHIUM LEVEL: ICD-10-CM

## 2025-05-08 ENCOUNTER — TRANSFERRED RECORDS (OUTPATIENT)
Dept: HEALTH INFORMATION MANAGEMENT | Facility: CLINIC | Age: 30
End: 2025-05-08
Payer: MEDICAID

## 2025-06-05 ENCOUNTER — ALLIED HEALTH/NURSE VISIT (OUTPATIENT)
Dept: FAMILY MEDICINE | Facility: CLINIC | Age: 30
End: 2025-06-05
Payer: MEDICAID

## 2025-06-05 VITALS — DIASTOLIC BLOOD PRESSURE: 74 MMHG | SYSTOLIC BLOOD PRESSURE: 110 MMHG

## 2025-06-05 DIAGNOSIS — Z30.42 ENCOUNTER FOR SURVEILLANCE OF INJECTABLE CONTRACEPTIVE: Primary | ICD-10-CM

## 2025-06-05 RX ADMIN — MEDROXYPROGESTERONE ACETATE 150 MG: 150 INJECTION, SUSPENSION INTRAMUSCULAR at 12:42

## 2025-06-05 NOTE — PROGRESS NOTES
Clinic Administered Medication Documentation      Depo Provera Documentation    Depo-Provera Standing Order inclusion/exclusion criteria reviewed.     Is this the initial or subsequent dose of Depo Provera? Subsequent dose - patient is within the acceptable window of time (11-15 weeks) for subsequent injection. Pregnancy test not indicated.    Patient meets: inclusion criteria     Is there an active order (written within the past 365 days, with administrations remaining, not ) in the chart? Yes.     Prior to injection, verified patient identity using patient's name and date of birth. Medication was administered. Please see MAR and medication order for additional information.     Vial/Syringe: Single dose vial. Was entire vial of medication used? Yes    Patient instructed to remain in clinic for 15 minutes and report any adverse reaction to staff immediately.  NEXT INJECTION DUE: 25 - 25    Patient has no refills remaining. Patient and care taker were advised she will need appt with pcp for depo renewal.        Claudia Sung MA

## 2025-06-18 DIAGNOSIS — K21.9 GASTROESOPHAGEAL REFLUX DISEASE WITHOUT ESOPHAGITIS: ICD-10-CM

## 2025-06-18 RX ORDER — FAMOTIDINE 20 MG/1
20 TABLET, FILM COATED ORAL AT BEDTIME
Qty: 28 TABLET | Refills: 0 | Status: SHIPPED | OUTPATIENT
Start: 2025-06-18

## 2025-07-28 ENCOUNTER — PATIENT OUTREACH (OUTPATIENT)
Dept: CARE COORDINATION | Facility: CLINIC | Age: 30
End: 2025-07-28
Payer: MEDICAID

## 2025-08-26 ENCOUNTER — OFFICE VISIT (OUTPATIENT)
Dept: FAMILY MEDICINE | Facility: CLINIC | Age: 30
End: 2025-08-26
Payer: MEDICAID

## 2025-08-26 VITALS
OXYGEN SATURATION: 99 % | SYSTOLIC BLOOD PRESSURE: 108 MMHG | TEMPERATURE: 97.8 F | HEART RATE: 68 BPM | HEIGHT: 62 IN | BODY MASS INDEX: 42.76 KG/M2 | WEIGHT: 232.4 LBS | RESPIRATION RATE: 18 BRPM | DIASTOLIC BLOOD PRESSURE: 72 MMHG

## 2025-08-26 DIAGNOSIS — K59.00 CONSTIPATION, UNSPECIFIED CONSTIPATION TYPE: ICD-10-CM

## 2025-08-26 DIAGNOSIS — R79.89 HIGH SERUM FERRITIN: ICD-10-CM

## 2025-08-26 DIAGNOSIS — Z30.42 ENCOUNTER FOR SURVEILLANCE OF INJECTABLE CONTRACEPTIVE: Primary | ICD-10-CM

## 2025-08-26 DIAGNOSIS — E03.9 HYPOTHYROIDISM, UNSPECIFIED TYPE: ICD-10-CM

## 2025-08-26 LAB
ERYTHROCYTE [DISTWIDTH] IN BLOOD BY AUTOMATED COUNT: 12.1 % (ref 10–15)
FERRITIN SERPL-MCNC: 616 NG/ML (ref 6–175)
HCT VFR BLD AUTO: 39.8 % (ref 35–47)
HGB BLD-MCNC: 12.8 G/DL (ref 11.7–15.7)
IRON BINDING CAPACITY (ROCHE): 317 UG/DL (ref 240–430)
IRON SATN MFR SERPL: 42 % (ref 15–46)
IRON SERPL-MCNC: 134 UG/DL (ref 37–145)
MCH RBC QN AUTO: 30.4 PG (ref 26.5–33)
MCHC RBC AUTO-ENTMCNC: 32.2 G/DL (ref 31.5–36.5)
MCV RBC AUTO: 94.5 FL (ref 78–100)
PLATELET # BLD AUTO: 127 10E3/UL (ref 150–450)
RBC # BLD AUTO: 4.21 10E6/UL (ref 3.8–5.2)
T4 FREE SERPL-MCNC: 0.89 NG/DL (ref 0.9–1.7)
TSH SERPL DL<=0.005 MIU/L-ACNC: 4.79 UIU/ML (ref 0.3–4.2)
WBC # BLD AUTO: 5.68 10E3/UL (ref 4–11)

## 2025-08-26 PROCEDURE — 84439 ASSAY OF FREE THYROXINE: CPT | Performed by: FAMILY MEDICINE

## 2025-08-26 PROCEDURE — 85027 COMPLETE CBC AUTOMATED: CPT | Performed by: FAMILY MEDICINE

## 2025-08-26 PROCEDURE — 82728 ASSAY OF FERRITIN: CPT | Performed by: FAMILY MEDICINE

## 2025-08-26 PROCEDURE — 84443 ASSAY THYROID STIM HORMONE: CPT | Performed by: FAMILY MEDICINE

## 2025-08-26 PROCEDURE — 36415 COLL VENOUS BLD VENIPUNCTURE: CPT | Performed by: FAMILY MEDICINE

## 2025-08-26 PROCEDURE — 83550 IRON BINDING TEST: CPT | Performed by: FAMILY MEDICINE

## 2025-08-26 PROCEDURE — 83540 ASSAY OF IRON: CPT | Performed by: FAMILY MEDICINE

## 2025-08-26 RX ORDER — POLYETHYLENE GLYCOL 3350 17 G/17G
1 POWDER, FOR SOLUTION ORAL DAILY
Qty: 850 G | Refills: 5 | Status: SHIPPED | OUTPATIENT
Start: 2025-08-26

## 2025-08-26 RX ORDER — DIVALPROEX SODIUM 500 MG/1
2000 TABLET, FILM COATED, EXTENDED RELEASE ORAL AT BEDTIME
COMMUNITY
Start: 2024-11-27 | End: 2025-11-27

## 2025-08-26 RX ORDER — OLANZAPINE AND SAMIDORPHAN L-MALATE 5; 10 MG/1; MG/1
1 TABLET, FILM COATED ORAL DAILY
COMMUNITY
Start: 2025-08-04

## 2025-08-26 RX ORDER — BROMOCRIPTINE MESYLATE 2.5 MG/1
TABLET ORAL
COMMUNITY
Start: 2025-08-22

## 2025-08-26 RX ORDER — MEDROXYPROGESTERONE ACETATE 150 MG/ML
150 INJECTION, SUSPENSION INTRAMUSCULAR
Status: ACTIVE | OUTPATIENT
Start: 2025-08-26 | End: 2026-08-21

## 2025-08-26 RX ADMIN — MEDROXYPROGESTERONE ACETATE 150 MG: 150 INJECTION, SUSPENSION INTRAMUSCULAR at 11:02

## 2025-08-27 ENCOUNTER — RESULTS FOLLOW-UP (OUTPATIENT)
Dept: FAMILY MEDICINE | Facility: CLINIC | Age: 30
End: 2025-08-27
Payer: MEDICAID

## 2025-08-27 DIAGNOSIS — E03.9 HYPOTHYROIDISM, UNSPECIFIED TYPE: Primary | Chronic | ICD-10-CM

## 2025-08-27 RX ORDER — LEVOTHYROXINE SODIUM 88 UG/1
88 TABLET ORAL
Qty: 90 TABLET | Refills: 0 | Status: SHIPPED | OUTPATIENT
Start: 2025-08-27